# Patient Record
Sex: FEMALE | Race: WHITE
[De-identification: names, ages, dates, MRNs, and addresses within clinical notes are randomized per-mention and may not be internally consistent; named-entity substitution may affect disease eponyms.]

---

## 2018-01-10 ENCOUNTER — HOSPITAL ENCOUNTER (OUTPATIENT)
Dept: HOSPITAL 53 - M LAB | Age: 83
End: 2018-01-10
Attending: FAMILY MEDICINE
Payer: MEDICARE

## 2018-01-10 ENCOUNTER — HOSPITAL ENCOUNTER (OUTPATIENT)
Dept: HOSPITAL 53 - M PT | Age: 83
LOS: 21 days | End: 2018-01-31
Attending: SURGERY
Payer: MEDICARE

## 2018-01-10 DIAGNOSIS — Z51.89: Primary | ICD-10-CM

## 2018-01-10 DIAGNOSIS — I89.0: ICD-10-CM

## 2018-01-10 DIAGNOSIS — I48.2: Primary | ICD-10-CM

## 2018-01-10 DIAGNOSIS — Z79.01: ICD-10-CM

## 2018-01-10 LAB
INR: 1.74
PROTHROMBIN TIME: 20.9 SECONDS (ref 12.4–14.5)

## 2018-01-10 PROCEDURE — 93005 ELECTROCARDIOGRAM TRACING: CPT

## 2018-01-10 PROCEDURE — 85610 PROTHROMBIN TIME: CPT

## 2018-01-18 ENCOUNTER — HOSPITAL ENCOUNTER (INPATIENT)
Dept: HOSPITAL 53 - M ED | Age: 83
LOS: 8 days | Discharge: NURSING, REHAB OR LONG-TERM CARE | DRG: 314 | End: 2018-01-26
Attending: INTERNAL MEDICINE | Admitting: INTERNAL MEDICINE
Payer: MEDICARE

## 2018-01-18 DIAGNOSIS — Z91.19: ICD-10-CM

## 2018-01-18 DIAGNOSIS — Z88.0: ICD-10-CM

## 2018-01-18 DIAGNOSIS — E11.40: ICD-10-CM

## 2018-01-18 DIAGNOSIS — I35.0: ICD-10-CM

## 2018-01-18 DIAGNOSIS — Z91.11: ICD-10-CM

## 2018-01-18 DIAGNOSIS — E66.01: ICD-10-CM

## 2018-01-18 DIAGNOSIS — E03.9: ICD-10-CM

## 2018-01-18 DIAGNOSIS — Z86.711: ICD-10-CM

## 2018-01-18 DIAGNOSIS — Z88.8: ICD-10-CM

## 2018-01-18 DIAGNOSIS — K44.9: ICD-10-CM

## 2018-01-18 DIAGNOSIS — Z79.4: ICD-10-CM

## 2018-01-18 DIAGNOSIS — I27.81: ICD-10-CM

## 2018-01-18 DIAGNOSIS — I44.2: ICD-10-CM

## 2018-01-18 DIAGNOSIS — I11.0: ICD-10-CM

## 2018-01-18 DIAGNOSIS — Z79.01: ICD-10-CM

## 2018-01-18 DIAGNOSIS — Z95.2: ICD-10-CM

## 2018-01-18 DIAGNOSIS — I50.33: ICD-10-CM

## 2018-01-18 DIAGNOSIS — K21.9: ICD-10-CM
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
## 2018-01-18 DIAGNOSIS — I46.8: ICD-10-CM

## 2018-01-18 DIAGNOSIS — Z86.718: ICD-10-CM

## 2018-01-18 DIAGNOSIS — M19.90: ICD-10-CM

## 2018-01-18 DIAGNOSIS — I48.2: ICD-10-CM

## 2018-01-18 DIAGNOSIS — T82.198A: Primary | ICD-10-CM

## 2018-01-18 DIAGNOSIS — I73.9: ICD-10-CM

## 2018-01-18 DIAGNOSIS — G47.33: ICD-10-CM

## 2018-01-18 DIAGNOSIS — I27.20: ICD-10-CM

## 2018-01-18 DIAGNOSIS — Z88.2: ICD-10-CM

## 2018-01-18 DIAGNOSIS — E78.00: ICD-10-CM

## 2018-01-18 LAB
ALBUMIN/GLOBULIN RATIO: 0.72 (ref 1–1.93)
ALBUMIN: 3.4 GM/DL (ref 3.2–5.2)
ALKALINE PHOSPHATASE: 92 U/L (ref 45–117)
ALT SERPL W P-5'-P-CCNC: 15 U/L (ref 12–78)
ANION GAP: 6 MEQ/L (ref 8–16)
APPEARANCE, URINE: CLEAR
AST SERPL-CCNC: 19 U/L (ref 7–37)
BACTERIA UR QL AUTO: (no result)
BASO #: 0.1 10^3/UL (ref 0–0.2)
BASO %: 1.2 % (ref 0–1)
BILIRUB CONJ SERPL-MCNC: 0.4 MG/DL (ref 0–0.2)
BILIRUBIN, URINE AUTO: NEGATIVE
BILIRUBIN,TOTAL: 1 MG/DL (ref 0.2–1)
BLOOD UREA NITROGEN: 22 MG/DL (ref 7–18)
BLOOD, URINE BLOOD: NEGATIVE
CALCIUM LEVEL: 9 MG/DL (ref 8.8–10.2)
CARBON DIOXIDE LEVEL: 27 MEQ/L (ref 21–32)
CHLORIDE LEVEL: 109 MEQ/L (ref 98–107)
CK MB CFR.DF SERPL CALC: 4.44
CK SERPL-CCNC: 36 U/L (ref 26–192)
CK-MB VALUE MASS: 1.6 NG/ML (ref 0–3.6)
CREATININE FOR GFR: 1.14 MG/DL (ref 0.55–1.02)
D-DIMER QUANT: 1016 NG/ML (ref ?–500)
EOS #: 0.2 10^3/UL (ref 0–0.5)
EOSINOPHIL NFR BLD AUTO: 1.9 % (ref 0–3)
FREE T4: 1.57 NG/DL (ref 0.76–1.46)
GFR SERPL CREATININE-BSD FRML MDRD: 48.6 ML/MIN/{1.73_M2} (ref 32–?)
GLUCOSE BLDC GLUCOMTR-MCNC: 105 MG/DL (ref 83–110)
GLUCOSE BLDC GLUCOMTR-MCNC: 106 MG/DL (ref 83–110)
GLUCOSE BLDC GLUCOMTR-MCNC: 95 MG/DL (ref 83–110)
GLUCOSE, FASTING: 120 MG/DL (ref 83–110)
GLUCOSE, URINE (UA) AUTO: NEGATIVE MG/DL
HEMATOCRIT: 42.8 % (ref 36–47)
HEMOGLOBIN: 13.5 G/DL (ref 12–16)
IMMATURE GRANULOCYTE #: 0 10^3/UL (ref 0–0)
IMMATURE GRANULOCYTE %: 0.4 % (ref 0–0)
INR: 1.24
KETONE, URINE AUTO: NEGATIVE MG/DL
LEUKOCYTE ESTERASE UR QL STRIP.AUTO: (no result)
LYMPH #: 1.3 10^3/UL (ref 1.5–4.5)
LYMPH %: 16.1 % (ref 24–44)
MAGNESIUM LEVEL: 2.1 MG/DL (ref 1.8–2.4)
MEAN CORPUSCULAR HEMOGLOBIN: 28.7 PG (ref 27–33)
MEAN CORPUSCULAR HGB CONC: 31.5 G/DL (ref 32–36.5)
MEAN CORPUSCULAR VOLUME: 90.9 FL (ref 80–96)
MONO #: 0.5 10^3/UL (ref 0–0.8)
MONO %: 6.5 % (ref 0–5)
NEUTROPHILS #: 6.1 10^3/UL (ref 1.8–7.7)
NEUTROPHILS %: 73.9 % (ref 36–66)
NITRITE, URINE AUTO: NEGATIVE
NRBC BLD AUTO-RTO: 0 % (ref 0–0)
PARTIAL THROMBOPLASTIN TIME: 31.5 SECONDS (ref 26.8–37.9)
PH,URINE: 5 UNITS (ref 5–9)
PLATELET COUNT, AUTOMATED: 207 10^3/UL (ref 150–450)
POTASSIUM SERUM: 4.4 MEQ/L (ref 3.5–5.1)
PROT UR QL STRIP.AUTO: NEGATIVE MG/DL
PROTHROMBIN TIME: 15.8 SECONDS (ref 12.4–14.5)
RBC, URINE AUTO: 1 /HPF (ref 0–3)
RED BLOOD COUNT: 4.71 10^6/UL (ref 4–5.4)
RED CELL DISTRIBUTION WIDTH: 16 % (ref 11.5–14.5)
SODIUM LEVEL: 142 MEQ/L (ref 136–145)
SPECIFIC GRAVITY URINE AUTO: 1 (ref 1–1.03)
SQUAMOUS #/AREA URNS AUTO: 0 /HPF (ref 0–6)
THYROID STIMULATING HORMONE: 5.75 UIU/ML (ref 0.36–3.74)
TOTAL PROTEIN: 8.1 GM/DL (ref 6.4–8.2)
TROPONIN I: < 0.02 NG/ML (ref ?–0.1)
UROBILINOGEN, URINE AUTO: 0.2 MG/DL (ref 0–2)
WBC, URINE AUTO: 9 /HPF (ref 0–3)
WHITE BLOOD COUNT: 8.3 10^3/UL (ref 4–10)

## 2018-01-18 RX ADMIN — INSULIN LISPRO 1 UNITS: 100 INJECTION, SOLUTION INTRAVENOUS; SUBCUTANEOUS at 21:00

## 2018-01-18 RX ADMIN — INSULIN DETEMIR 1 UNITS: 100 INJECTION, SOLUTION SUBCUTANEOUS at 20:46

## 2018-01-18 RX ADMIN — ALBUTEROL SULFATE 1 MG: 2.5 SOLUTION RESPIRATORY (INHALATION) at 22:29

## 2018-01-18 RX ADMIN — INSULIN LISPRO 2 UNITS: 100 INJECTION, SOLUTION INTRAVENOUS; SUBCUTANEOUS at 18:19

## 2018-01-18 RX ADMIN — OMEPRAZOLE 1 MG: 20 CAPSULE, DELAYED RELEASE ORAL at 09:00

## 2018-01-18 RX ADMIN — IPRATROPIUM BROMIDE AND ALBUTEROL SULFATE 1 ML: .5; 3 SOLUTION RESPIRATORY (INHALATION) at 14:48

## 2018-01-18 RX ADMIN — ENOXAPARIN SODIUM 1 MG: 100 INJECTION SUBCUTANEOUS at 18:17

## 2018-01-18 RX ADMIN — DOCUSATE SODIUM,SENNOSIDES 1 TAB: 50; 8.6 TABLET, FILM COATED ORAL at 20:46

## 2018-01-18 RX ADMIN — TORSEMIDE 1 MG: 20 TABLET ORAL at 18:18

## 2018-01-18 RX ADMIN — ALBUTEROL SULFATE 1 MG: 2.5 SOLUTION RESPIRATORY (INHALATION) at 14:48

## 2018-01-18 RX ADMIN — WARFARIN SODIUM 1 MG: 5 TABLET ORAL at 18:18

## 2018-01-18 RX ADMIN — ENOXAPARIN SODIUM 1 MG: 100 INJECTION SUBCUTANEOUS at 14:30

## 2018-01-19 LAB
ALBUMIN: 2.7 GM/DL (ref 3.2–5.2)
ANION GAP: 9 MEQ/L (ref 8–16)
BLOOD UREA NITROGEN: 21 MG/DL (ref 7–18)
CALCIUM LEVEL: 8.5 MG/DL (ref 8.8–10.2)
CARBON DIOXIDE LEVEL: 26 MEQ/L (ref 21–32)
CHLORIDE LEVEL: 108 MEQ/L (ref 98–107)
CREATININE FOR GFR: 1.11 MG/DL (ref 0.55–1.02)
GFR SERPL CREATININE-BSD FRML MDRD: 50.1 ML/MIN/{1.73_M2} (ref 32–?)
GLUCOSE BLDC GLUCOMTR-MCNC: 111 MG/DL (ref 83–110)
GLUCOSE BLDC GLUCOMTR-MCNC: 130 MG/DL (ref 83–110)
GLUCOSE BLDC GLUCOMTR-MCNC: 161 MG/DL (ref 83–110)
GLUCOSE, FASTING: 111 MG/DL (ref 83–110)
INR: 1.42
PHOSPHORUS LEVEL: 4.1 MG/DL (ref 2.5–4.9)
POTASSIUM SERUM: 4 MEQ/L (ref 3.5–5.1)
PROTHROMBIN TIME: 17.7 SECONDS (ref 12.4–14.5)
SODIUM LEVEL: 143 MEQ/L (ref 136–145)
TROPONIN I: 0.03 NG/ML (ref ?–0.1)

## 2018-01-19 RX ADMIN — TORSEMIDE 1 MG: 20 TABLET ORAL at 08:46

## 2018-01-19 RX ADMIN — DOCUSATE SODIUM,SENNOSIDES 1 TAB: 50; 8.6 TABLET, FILM COATED ORAL at 08:46

## 2018-01-19 RX ADMIN — SPIRONOLACTONE 1 MG: 25 TABLET, FILM COATED ORAL at 16:52

## 2018-01-19 RX ADMIN — INSULIN LISPRO 4 UNITS: 100 INJECTION, SOLUTION INTRAVENOUS; SUBCUTANEOUS at 12:25

## 2018-01-19 RX ADMIN — ALBUTEROL SULFATE 1 MG: 2.5 SOLUTION RESPIRATORY (INHALATION) at 04:27

## 2018-01-19 RX ADMIN — OMEPRAZOLE 1 MG: 20 CAPSULE, DELAYED RELEASE ORAL at 08:46

## 2018-01-19 RX ADMIN — WARFARIN SODIUM 1 MG: 3 TABLET ORAL at 16:52

## 2018-01-19 RX ADMIN — TORSEMIDE 1 MG: 20 TABLET ORAL at 16:52

## 2018-01-19 RX ADMIN — LEVOTHYROXINE SODIUM 1 MCG: 25 TABLET ORAL at 05:55

## 2018-01-19 RX ADMIN — DOCUSATE SODIUM,SENNOSIDES 1 TAB: 50; 8.6 TABLET, FILM COATED ORAL at 21:52

## 2018-01-19 RX ADMIN — INSULIN LISPRO 2 UNITS: 100 INJECTION, SOLUTION INTRAVENOUS; SUBCUTANEOUS at 08:47

## 2018-01-19 RX ADMIN — ENOXAPARIN SODIUM 1 MG: 100 INJECTION SUBCUTANEOUS at 05:55

## 2018-01-19 RX ADMIN — ENOXAPARIN SODIUM 1 MG: 100 INJECTION SUBCUTANEOUS at 16:52

## 2018-01-19 RX ADMIN — INSULIN DETEMIR 1 UNITS: 100 INJECTION, SOLUTION SUBCUTANEOUS at 08:46

## 2018-01-19 RX ADMIN — INSULIN DETEMIR 1 UNITS: 100 INJECTION, SOLUTION SUBCUTANEOUS at 21:52

## 2018-01-19 RX ADMIN — INSULIN LISPRO 1 UNITS: 100 INJECTION, SOLUTION INTRAVENOUS; SUBCUTANEOUS at 21:00

## 2018-01-19 RX ADMIN — INSULIN LISPRO 2 UNITS: 100 INJECTION, SOLUTION INTRAVENOUS; SUBCUTANEOUS at 16:53

## 2018-01-20 LAB
ALBUMIN: 2.6 GM/DL (ref 3.2–5.2)
ANION GAP: 8 MEQ/L (ref 8–16)
BLOOD UREA NITROGEN: 20 MG/DL (ref 7–18)
CALCIUM LEVEL: 8.2 MG/DL (ref 8.8–10.2)
CARBON DIOXIDE LEVEL: 32 MEQ/L (ref 21–32)
CHLORIDE LEVEL: 104 MEQ/L (ref 98–107)
CREATININE FOR GFR: 1.19 MG/DL (ref 0.55–1.02)
GFR SERPL CREATININE-BSD FRML MDRD: 46.2 ML/MIN/{1.73_M2} (ref 32–?)
GLUCOSE BLDC GLUCOMTR-MCNC: 119 MG/DL (ref 83–110)
GLUCOSE BLDC GLUCOMTR-MCNC: 122 MG/DL (ref 83–110)
GLUCOSE BLDC GLUCOMTR-MCNC: 133 MG/DL (ref 83–110)
GLUCOSE, FASTING: 101 MG/DL (ref 83–110)
INR: 1.78
PHOSPHORUS LEVEL: 3.4 MG/DL (ref 2.5–4.9)
POTASSIUM SERUM: 3.2 MEQ/L (ref 3.5–5.1)
PROTHROMBIN TIME: 21.3 SECONDS (ref 12.4–14.5)
SODIUM LEVEL: 144 MEQ/L (ref 136–145)

## 2018-01-20 RX ADMIN — INSULIN DETEMIR 1 UNITS: 100 INJECTION, SOLUTION SUBCUTANEOUS at 07:53

## 2018-01-20 RX ADMIN — DOCUSATE SODIUM,SENNOSIDES 1 TAB: 50; 8.6 TABLET, FILM COATED ORAL at 20:24

## 2018-01-20 RX ADMIN — INSULIN LISPRO 1 UNITS: 100 INJECTION, SOLUTION INTRAVENOUS; SUBCUTANEOUS at 17:26

## 2018-01-20 RX ADMIN — NYSTATIN 1 DOSE: 100000 POWDER TOPICAL at 20:24

## 2018-01-20 RX ADMIN — POTASSIUM CHLORIDE 1 MEQ: 750 TABLET, EXTENDED RELEASE ORAL at 20:23

## 2018-01-20 RX ADMIN — LEVOTHYROXINE SODIUM 1 MCG: 25 TABLET ORAL at 05:41

## 2018-01-20 RX ADMIN — OMEPRAZOLE 1 MG: 20 CAPSULE, DELAYED RELEASE ORAL at 07:53

## 2018-01-20 RX ADMIN — POTASSIUM CHLORIDE 1 MEQ: 750 TABLET, EXTENDED RELEASE ORAL at 14:19

## 2018-01-20 RX ADMIN — INSULIN LISPRO 1 UNITS: 100 INJECTION, SOLUTION INTRAVENOUS; SUBCUTANEOUS at 20:25

## 2018-01-20 RX ADMIN — TORSEMIDE 1 MG: 20 TABLET ORAL at 07:52

## 2018-01-20 RX ADMIN — INSULIN DETEMIR 1 UNITS: 100 INJECTION, SOLUTION SUBCUTANEOUS at 20:27

## 2018-01-20 RX ADMIN — DOCUSATE SODIUM,SENNOSIDES 1 TAB: 50; 8.6 TABLET, FILM COATED ORAL at 07:53

## 2018-01-20 RX ADMIN — SPIRONOLACTONE 1 MG: 25 TABLET, FILM COATED ORAL at 07:53

## 2018-01-20 RX ADMIN — INSULIN LISPRO 1 UNITS: 100 INJECTION, SOLUTION INTRAVENOUS; SUBCUTANEOUS at 07:30

## 2018-01-20 RX ADMIN — ACETAMINOPHEN 1 MG: 325 TABLET ORAL at 17:25

## 2018-01-20 RX ADMIN — WARFARIN SODIUM 1 MG: 5 TABLET ORAL at 17:25

## 2018-01-20 RX ADMIN — ENOXAPARIN SODIUM 1 MG: 100 INJECTION SUBCUTANEOUS at 05:41

## 2018-01-20 RX ADMIN — NYSTATIN 1 DOSE: 100000 POWDER TOPICAL at 09:00

## 2018-01-20 RX ADMIN — INSULIN LISPRO 2 UNITS: 100 INJECTION, SOLUTION INTRAVENOUS; SUBCUTANEOUS at 14:20

## 2018-01-21 LAB
ALBUMIN: 2.6 GM/DL (ref 3.2–5.2)
ANION GAP: 7 MEQ/L (ref 8–16)
BLOOD UREA NITROGEN: 22 MG/DL (ref 7–18)
CALCIUM LEVEL: 8.1 MG/DL (ref 8.8–10.2)
CARBON DIOXIDE LEVEL: 33 MEQ/L (ref 21–32)
CHLORIDE LEVEL: 103 MEQ/L (ref 98–107)
CREATININE FOR GFR: 1.15 MG/DL (ref 0.55–1.02)
GFR SERPL CREATININE-BSD FRML MDRD: 48.1 ML/MIN/{1.73_M2} (ref 32–?)
GLUCOSE BLDC GLUCOMTR-MCNC: 128 MG/DL (ref 83–110)
GLUCOSE BLDC GLUCOMTR-MCNC: 150 MG/DL (ref 83–110)
GLUCOSE BLDC GLUCOMTR-MCNC: 153 MG/DL (ref 83–110)
GLUCOSE, FASTING: 103 MG/DL (ref 83–110)
INR: 1.89
PHOSPHORUS LEVEL: 3.3 MG/DL (ref 2.5–4.9)
POTASSIUM SERUM: 3.6 MEQ/L (ref 3.5–5.1)
PROTHROMBIN TIME: 22.3 SECONDS (ref 12.4–14.5)
SODIUM LEVEL: 143 MEQ/L (ref 136–145)

## 2018-01-21 RX ADMIN — SPIRONOLACTONE 1 MG: 25 TABLET, FILM COATED ORAL at 08:14

## 2018-01-21 RX ADMIN — INSULIN LISPRO 1 UNITS: 100 INJECTION, SOLUTION INTRAVENOUS; SUBCUTANEOUS at 07:30

## 2018-01-21 RX ADMIN — LEVOTHYROXINE SODIUM 1 MCG: 25 TABLET ORAL at 06:11

## 2018-01-21 RX ADMIN — INSULIN DETEMIR 1 UNITS: 100 INJECTION, SOLUTION SUBCUTANEOUS at 08:15

## 2018-01-21 RX ADMIN — NYSTATIN 1 DOSE: 100000 POWDER TOPICAL at 20:59

## 2018-01-21 RX ADMIN — DOCUSATE SODIUM,SENNOSIDES 1 TAB: 50; 8.6 TABLET, FILM COATED ORAL at 08:14

## 2018-01-21 RX ADMIN — WARFARIN SODIUM 1 MG: 5 TABLET ORAL at 18:31

## 2018-01-21 RX ADMIN — INSULIN LISPRO 2 UNITS: 100 INJECTION, SOLUTION INTRAVENOUS; SUBCUTANEOUS at 12:50

## 2018-01-21 RX ADMIN — INSULIN LISPRO 1 UNITS: 100 INJECTION, SOLUTION INTRAVENOUS; SUBCUTANEOUS at 20:59

## 2018-01-21 RX ADMIN — DOCUSATE SODIUM,SENNOSIDES 1 TAB: 50; 8.6 TABLET, FILM COATED ORAL at 20:59

## 2018-01-21 RX ADMIN — INSULIN DETEMIR 1 UNITS: 100 INJECTION, SOLUTION SUBCUTANEOUS at 20:58

## 2018-01-21 RX ADMIN — POTASSIUM CHLORIDE 1 MEQ: 750 TABLET, EXTENDED RELEASE ORAL at 08:14

## 2018-01-21 RX ADMIN — ALBUTEROL SULFATE 1 MG: 2.5 SOLUTION RESPIRATORY (INHALATION) at 04:35

## 2018-01-21 RX ADMIN — NYSTATIN 1 DOSE: 100000 POWDER TOPICAL at 08:15

## 2018-01-21 RX ADMIN — TORSEMIDE 1 MG: 20 TABLET ORAL at 12:50

## 2018-01-21 RX ADMIN — INSULIN LISPRO 4 UNITS: 100 INJECTION, SOLUTION INTRAVENOUS; SUBCUTANEOUS at 18:32

## 2018-01-21 RX ADMIN — OMEPRAZOLE 1 MG: 20 CAPSULE, DELAYED RELEASE ORAL at 08:14

## 2018-01-21 RX ADMIN — POTASSIUM CHLORIDE 1 MEQ: 750 TABLET, EXTENDED RELEASE ORAL at 20:59

## 2018-01-22 LAB
ALBUMIN: 2.6 GM/DL (ref 3.2–5.2)
ANION GAP: 7 MEQ/L (ref 8–16)
BLOOD UREA NITROGEN: 22 MG/DL (ref 7–18)
CALCIUM LEVEL: 8 MG/DL (ref 8.8–10.2)
CARBON DIOXIDE LEVEL: 30 MEQ/L (ref 21–32)
CHLORIDE LEVEL: 104 MEQ/L (ref 98–107)
CREATININE FOR GFR: 1.26 MG/DL (ref 0.55–1.02)
GFR SERPL CREATININE-BSD FRML MDRD: 43.3 ML/MIN/{1.73_M2} (ref 32–?)
GLUCOSE BLDC GLUCOMTR-MCNC: 114 MG/DL (ref 83–110)
GLUCOSE BLDC GLUCOMTR-MCNC: 127 MG/DL (ref 83–110)
GLUCOSE BLDC GLUCOMTR-MCNC: 177 MG/DL (ref 83–110)
GLUCOSE, FASTING: 121 MG/DL (ref 83–110)
INR: 2.32
PHOSPHORUS LEVEL: 3.4 MG/DL (ref 2.5–4.9)
POTASSIUM SERUM: 4.3 MEQ/L (ref 3.5–5.1)
PROTHROMBIN TIME: 26.4 SECONDS (ref 12.4–14.5)
SODIUM LEVEL: 141 MEQ/L (ref 136–145)

## 2018-01-22 RX ADMIN — ACETAMINOPHEN 1 MG: 325 TABLET ORAL at 22:16

## 2018-01-22 RX ADMIN — INSULIN DETEMIR 1 UNITS: 100 INJECTION, SOLUTION SUBCUTANEOUS at 22:15

## 2018-01-22 RX ADMIN — SPIRONOLACTONE 1 MG: 25 TABLET, FILM COATED ORAL at 09:23

## 2018-01-22 RX ADMIN — INSULIN LISPRO 1 UNITS: 100 INJECTION, SOLUTION INTRAVENOUS; SUBCUTANEOUS at 21:00

## 2018-01-22 RX ADMIN — NYSTATIN 1 DOSE: 100000 POWDER TOPICAL at 09:24

## 2018-01-22 RX ADMIN — NYSTATIN 1 DOSE: 100000 POWDER TOPICAL at 21:00

## 2018-01-22 RX ADMIN — ALBUTEROL SULFATE 1 MG: 2.5 SOLUTION RESPIRATORY (INHALATION) at 03:45

## 2018-01-22 RX ADMIN — INSULIN LISPRO 4 UNITS: 100 INJECTION, SOLUTION INTRAVENOUS; SUBCUTANEOUS at 16:44

## 2018-01-22 RX ADMIN — INSULIN LISPRO 2 UNITS: 100 INJECTION, SOLUTION INTRAVENOUS; SUBCUTANEOUS at 11:23

## 2018-01-22 RX ADMIN — LEVOTHYROXINE SODIUM 1 MCG: 25 TABLET ORAL at 06:07

## 2018-01-22 RX ADMIN — DOCUSATE SODIUM,SENNOSIDES 1 TAB: 50; 8.6 TABLET, FILM COATED ORAL at 09:22

## 2018-01-22 RX ADMIN — INSULIN DETEMIR 1 UNITS: 100 INJECTION, SOLUTION SUBCUTANEOUS at 09:22

## 2018-01-22 RX ADMIN — DOCUSATE SODIUM,SENNOSIDES 1 TAB: 50; 8.6 TABLET, FILM COATED ORAL at 21:00

## 2018-01-22 RX ADMIN — POTASSIUM CHLORIDE 1 MEQ: 750 TABLET, EXTENDED RELEASE ORAL at 09:23

## 2018-01-22 RX ADMIN — TORSEMIDE 1 MG: 20 TABLET ORAL at 09:23

## 2018-01-22 RX ADMIN — INSULIN LISPRO 2 UNITS: 100 INJECTION, SOLUTION INTRAVENOUS; SUBCUTANEOUS at 09:22

## 2018-01-22 RX ADMIN — OMEPRAZOLE 1 MG: 20 CAPSULE, DELAYED RELEASE ORAL at 09:23

## 2018-01-22 RX ADMIN — WARFARIN SODIUM 1 MG: 5 TABLET ORAL at 16:44

## 2018-01-22 RX ADMIN — ALBUTEROL SULFATE 1 MG: 2.5 SOLUTION RESPIRATORY (INHALATION) at 21:01

## 2018-01-23 LAB
ALBUMIN: 2.5 GM/DL (ref 3.2–5.2)
ANION GAP: 9 MEQ/L (ref 8–16)
BLOOD UREA NITROGEN: 23 MG/DL (ref 7–18)
CALCIUM LEVEL: 8.4 MG/DL (ref 8.8–10.2)
CARBON DIOXIDE LEVEL: 30 MEQ/L (ref 21–32)
CHLORIDE LEVEL: 102 MEQ/L (ref 98–107)
CREATININE FOR GFR: 1.12 MG/DL (ref 0.55–1.02)
GFR SERPL CREATININE-BSD FRML MDRD: 49.6 ML/MIN/{1.73_M2} (ref 32–?)
GLUCOSE BLDC GLUCOMTR-MCNC: 113 MG/DL (ref 83–110)
GLUCOSE BLDC GLUCOMTR-MCNC: 134 MG/DL (ref 83–110)
GLUCOSE BLDC GLUCOMTR-MCNC: 166 MG/DL (ref 83–110)
GLUCOSE, FASTING: 102 MG/DL (ref 70–100)
INR: 2.4
PHOSPHORUS LEVEL: 3.8 MG/DL (ref 2.5–4.9)
POTASSIUM SERUM: 4 MEQ/L (ref 3.5–5.1)
PROTHROMBIN TIME: 27.1 SECONDS (ref 12.4–14.5)
SODIUM LEVEL: 141 MEQ/L (ref 136–145)

## 2018-01-23 RX ADMIN — NYSTATIN 1 DOSE: 100000 POWDER TOPICAL at 20:25

## 2018-01-23 RX ADMIN — INSULIN DETEMIR 1 UNITS: 100 INJECTION, SOLUTION SUBCUTANEOUS at 08:46

## 2018-01-23 RX ADMIN — INSULIN LISPRO 2 UNITS: 100 INJECTION, SOLUTION INTRAVENOUS; SUBCUTANEOUS at 08:46

## 2018-01-23 RX ADMIN — DOCUSATE SODIUM,SENNOSIDES 1 TAB: 50; 8.6 TABLET, FILM COATED ORAL at 20:24

## 2018-01-23 RX ADMIN — LEVOTHYROXINE SODIUM 1 MCG: 25 TABLET ORAL at 06:14

## 2018-01-23 RX ADMIN — DOCUSATE SODIUM,SENNOSIDES 1 TAB: 50; 8.6 TABLET, FILM COATED ORAL at 08:47

## 2018-01-23 RX ADMIN — INSULIN LISPRO 1 UNITS: 100 INJECTION, SOLUTION INTRAVENOUS; SUBCUTANEOUS at 20:30

## 2018-01-23 RX ADMIN — INSULIN DETEMIR 1 UNITS: 100 INJECTION, SOLUTION SUBCUTANEOUS at 20:24

## 2018-01-23 RX ADMIN — INSULIN LISPRO 4 UNITS: 100 INJECTION, SOLUTION INTRAVENOUS; SUBCUTANEOUS at 17:39

## 2018-01-23 RX ADMIN — INSULIN LISPRO 2 UNITS: 100 INJECTION, SOLUTION INTRAVENOUS; SUBCUTANEOUS at 13:14

## 2018-01-23 RX ADMIN — SPIRONOLACTONE 1 MG: 25 TABLET, FILM COATED ORAL at 08:47

## 2018-01-23 RX ADMIN — TORSEMIDE 1 MG: 20 TABLET ORAL at 08:47

## 2018-01-23 RX ADMIN — OMEPRAZOLE 1 MG: 20 CAPSULE, DELAYED RELEASE ORAL at 08:47

## 2018-01-23 RX ADMIN — NYSTATIN 1 DOSE: 100000 POWDER TOPICAL at 08:48

## 2018-01-23 RX ADMIN — WARFARIN SODIUM 1 MG: 5 TABLET ORAL at 17:39

## 2018-01-24 LAB
ALBUMIN: 2.5 GM/DL (ref 3.2–5.2)
ANION GAP: 7 MEQ/L (ref 8–16)
BLOOD UREA NITROGEN: 24 MG/DL (ref 7–18)
CALCIUM LEVEL: 8.2 MG/DL (ref 8.8–10.2)
CARBON DIOXIDE LEVEL: 32 MEQ/L (ref 21–32)
CHLORIDE LEVEL: 102 MEQ/L (ref 98–107)
CREATININE FOR GFR: 1.18 MG/DL (ref 0.55–1.02)
GFR SERPL CREATININE-BSD FRML MDRD: 46.7 ML/MIN/{1.73_M2} (ref 32–?)
GLUCOSE BLDC GLUCOMTR-MCNC: 131 MG/DL (ref 83–110)
GLUCOSE BLDC GLUCOMTR-MCNC: 142 MG/DL (ref 83–110)
GLUCOSE BLDC GLUCOMTR-MCNC: 158 MG/DL (ref 83–110)
GLUCOSE, FASTING: 126 MG/DL (ref 70–100)
INR: 2.65
PHOSPHORUS LEVEL: 3.8 MG/DL (ref 2.5–4.9)
POTASSIUM SERUM: 4 MEQ/L (ref 3.5–5.1)
PROTHROMBIN TIME: 29.4 SECONDS (ref 12.4–14.5)
SODIUM LEVEL: 141 MEQ/L (ref 136–145)

## 2018-01-24 RX ADMIN — Medication 1 DOSE: at 12:30

## 2018-01-24 RX ADMIN — ACETAMINOPHEN 1 MG: 325 TABLET ORAL at 17:10

## 2018-01-24 RX ADMIN — ACETAMINOPHEN 1 MG: 325 TABLET ORAL at 07:44

## 2018-01-24 RX ADMIN — TORSEMIDE 1 MG: 20 TABLET ORAL at 09:27

## 2018-01-24 RX ADMIN — SPIRONOLACTONE 1 MG: 25 TABLET, FILM COATED ORAL at 09:27

## 2018-01-24 RX ADMIN — DOCUSATE SODIUM,SENNOSIDES 1 TAB: 50; 8.6 TABLET, FILM COATED ORAL at 09:27

## 2018-01-24 RX ADMIN — INSULIN LISPRO 2 UNITS: 100 INJECTION, SOLUTION INTRAVENOUS; SUBCUTANEOUS at 09:29

## 2018-01-24 RX ADMIN — NYSTATIN 1 DOSE: 100000 POWDER TOPICAL at 09:30

## 2018-01-24 RX ADMIN — WARFARIN SODIUM 1 MG: 5 TABLET ORAL at 17:09

## 2018-01-24 RX ADMIN — INSULIN DETEMIR 1 UNITS: 100 INJECTION, SOLUTION SUBCUTANEOUS at 09:28

## 2018-01-24 RX ADMIN — INSULIN DETEMIR 1 UNITS: 100 INJECTION, SOLUTION SUBCUTANEOUS at 20:48

## 2018-01-24 RX ADMIN — INSULIN LISPRO 1 UNITS: 100 INJECTION, SOLUTION INTRAVENOUS; SUBCUTANEOUS at 20:43

## 2018-01-24 RX ADMIN — INSULIN LISPRO 4 UNITS: 100 INJECTION, SOLUTION INTRAVENOUS; SUBCUTANEOUS at 13:58

## 2018-01-24 RX ADMIN — DOCUSATE SODIUM,SENNOSIDES 1 TAB: 50; 8.6 TABLET, FILM COATED ORAL at 20:48

## 2018-01-24 RX ADMIN — Medication 1 DOSE: at 20:48

## 2018-01-24 RX ADMIN — LEVOTHYROXINE SODIUM 1 MCG: 25 TABLET ORAL at 05:09

## 2018-01-24 RX ADMIN — INSULIN LISPRO 1 UNITS: 100 INJECTION, SOLUTION INTRAVENOUS; SUBCUTANEOUS at 18:38

## 2018-01-24 RX ADMIN — NYSTATIN 1 DOSE: 100000 POWDER TOPICAL at 20:48

## 2018-01-24 RX ADMIN — OMEPRAZOLE 1 MG: 20 CAPSULE, DELAYED RELEASE ORAL at 09:28

## 2018-01-25 LAB
ALBUMIN: 2.6 GM/DL (ref 3.2–5.2)
ANION GAP: 7 MEQ/L (ref 8–16)
BLOOD UREA NITROGEN: 29 MG/DL (ref 7–18)
CALCIUM LEVEL: 7.9 MG/DL (ref 8.8–10.2)
CARBON DIOXIDE LEVEL: 30 MEQ/L (ref 21–32)
CHLORIDE LEVEL: 105 MEQ/L (ref 98–107)
CREATININE FOR GFR: 1.15 MG/DL (ref 0.55–1.02)
GFR SERPL CREATININE-BSD FRML MDRD: 48.1 ML/MIN/{1.73_M2} (ref 32–?)
GLUCOSE BLDC GLUCOMTR-MCNC: 159 MG/DL (ref 83–110)
GLUCOSE BLDC GLUCOMTR-MCNC: 172 MG/DL (ref 83–110)
GLUCOSE, FASTING: 108 MG/DL (ref 70–100)
INR: 2.64
PHOSPHORUS LEVEL: 3.2 MG/DL (ref 2.5–4.9)
POTASSIUM SERUM: 3.9 MEQ/L (ref 3.5–5.1)
PROTHROMBIN TIME: 29.3 SECONDS (ref 12.4–14.5)
SODIUM LEVEL: 142 MEQ/L (ref 136–145)

## 2018-01-25 RX ADMIN — LEVOTHYROXINE SODIUM 1 MCG: 25 TABLET ORAL at 05:02

## 2018-01-25 RX ADMIN — SPIRONOLACTONE 1 MG: 25 TABLET, FILM COATED ORAL at 08:00

## 2018-01-25 RX ADMIN — INSULIN LISPRO 2 UNITS: 100 INJECTION, SOLUTION INTRAVENOUS; SUBCUTANEOUS at 17:52

## 2018-01-25 RX ADMIN — OMEPRAZOLE 1 MG: 20 CAPSULE, DELAYED RELEASE ORAL at 08:00

## 2018-01-25 RX ADMIN — Medication 1 DOSE: at 08:01

## 2018-01-25 RX ADMIN — ACETAMINOPHEN 1 MG: 325 TABLET ORAL at 01:56

## 2018-01-25 RX ADMIN — WARFARIN SODIUM 1 MG: 5 TABLET ORAL at 17:52

## 2018-01-25 RX ADMIN — INSULIN LISPRO 4 UNITS: 100 INJECTION, SOLUTION INTRAVENOUS; SUBCUTANEOUS at 13:06

## 2018-01-25 RX ADMIN — TORSEMIDE 1 MG: 20 TABLET ORAL at 08:01

## 2018-01-25 RX ADMIN — DOCUSATE SODIUM,SENNOSIDES 1 TAB: 50; 8.6 TABLET, FILM COATED ORAL at 08:01

## 2018-01-25 RX ADMIN — INSULIN LISPRO 1 UNITS: 100 INJECTION, SOLUTION INTRAVENOUS; SUBCUTANEOUS at 21:01

## 2018-01-25 RX ADMIN — INSULIN LISPRO 2 UNITS: 100 INJECTION, SOLUTION INTRAVENOUS; SUBCUTANEOUS at 08:02

## 2018-01-25 RX ADMIN — INSULIN DETEMIR 1 UNITS: 100 INJECTION, SOLUTION SUBCUTANEOUS at 21:00

## 2018-01-25 RX ADMIN — NYSTATIN 1 DOSE: 100000 POWDER TOPICAL at 21:01

## 2018-01-25 RX ADMIN — NYSTATIN 1 DOSE: 100000 POWDER TOPICAL at 08:01

## 2018-01-25 RX ADMIN — DOCUSATE SODIUM,SENNOSIDES 1 TAB: 50; 8.6 TABLET, FILM COATED ORAL at 21:00

## 2018-01-25 RX ADMIN — INSULIN DETEMIR 1 UNITS: 100 INJECTION, SOLUTION SUBCUTANEOUS at 08:02

## 2018-01-25 RX ADMIN — ACETAMINOPHEN 1 MG: 325 TABLET ORAL at 08:02

## 2018-01-25 RX ADMIN — Medication 1 DOSE: at 21:01

## 2018-01-26 LAB
GLUCOSE BLDC GLUCOMTR-MCNC: 101 MG/DL (ref 83–110)
GLUCOSE BLDC GLUCOMTR-MCNC: 112 MG/DL (ref 83–110)
GLUCOSE BLDC GLUCOMTR-MCNC: 175 MG/DL (ref 83–110)

## 2018-01-26 RX ADMIN — NYSTATIN 1 DOSE: 100000 POWDER TOPICAL at 09:08

## 2018-01-26 RX ADMIN — SPIRONOLACTONE 1 MG: 25 TABLET, FILM COATED ORAL at 09:08

## 2018-01-26 RX ADMIN — DOCUSATE SODIUM,SENNOSIDES 1 TAB: 50; 8.6 TABLET, FILM COATED ORAL at 09:00

## 2018-01-26 RX ADMIN — Medication 1 DOSE: at 09:09

## 2018-01-26 RX ADMIN — INSULIN LISPRO 1 UNITS: 100 INJECTION, SOLUTION INTRAVENOUS; SUBCUTANEOUS at 07:28

## 2018-01-26 RX ADMIN — OMEPRAZOLE 1 MG: 20 CAPSULE, DELAYED RELEASE ORAL at 09:08

## 2018-01-26 RX ADMIN — TORSEMIDE 1 MG: 20 TABLET ORAL at 09:08

## 2018-01-26 RX ADMIN — ACETAMINOPHEN 1 MG: 325 TABLET ORAL at 00:25

## 2018-01-26 RX ADMIN — INSULIN DETEMIR 1 UNITS: 100 INJECTION, SOLUTION SUBCUTANEOUS at 09:08

## 2018-01-26 RX ADMIN — LEVOTHYROXINE SODIUM 1 MCG: 25 TABLET ORAL at 05:35

## 2018-01-26 RX ADMIN — DOCUSATE SODIUM,SENNOSIDES 1 TAB: 50; 8.6 TABLET, FILM COATED ORAL at 09:07

## 2018-01-30 ENCOUNTER — HOSPITAL ENCOUNTER (OUTPATIENT)
Dept: HOSPITAL 53 - SSVSNF3 | Age: 83
End: 2018-01-30
Attending: INTERNAL MEDICINE
Payer: MEDICARE

## 2018-01-30 DIAGNOSIS — I50.9: Primary | ICD-10-CM

## 2018-01-30 DIAGNOSIS — Z79.899: ICD-10-CM

## 2018-01-30 LAB
ALBUMIN/GLOBULIN RATIO: 0.71 (ref 1–1.93)
ALBUMIN: 2.9 GM/DL (ref 3.2–5.2)
ALKALINE PHOSPHATASE: 96 U/L (ref 45–117)
ALT SERPL W P-5'-P-CCNC: 19 U/L (ref 12–78)
ANION GAP: 8 MEQ/L (ref 8–16)
AST SERPL-CCNC: 29 U/L (ref 7–37)
BILIRUBIN,TOTAL: 0.6 MG/DL (ref 0.2–1)
BLOOD UREA NITROGEN: 29 MG/DL (ref 7–18)
CALCIUM LEVEL: 8.1 MG/DL (ref 8.8–10.2)
CARBON DIOXIDE LEVEL: 31 MEQ/L (ref 21–32)
CHLORIDE LEVEL: 101 MEQ/L (ref 98–107)
CREATININE FOR GFR: 1.31 MG/DL (ref 0.55–1.3)
EST. AVERAGE GLUCOSE BLD GHB EST-MCNC: 140 MG/DL (ref 60–110)
GFR SERPL CREATININE-BSD FRML MDRD: 41.4 ML/MIN/{1.73_M2} (ref 32–?)
GLUCOSE, FASTING: 171 MG/DL (ref 70–100)
HEMATOCRIT: 41.3 % (ref 36–47)
HEMOGLOBIN: 13.1 G/DL (ref 12–16)
MEAN CORPUSCULAR HEMOGLOBIN: 27.9 PG (ref 27–33)
MEAN CORPUSCULAR HGB CONC: 31.7 G/DL (ref 32–36.5)
MEAN CORPUSCULAR VOLUME: 87.9 FL (ref 80–96)
NRBC BLD AUTO-RTO: 0 % (ref 0–0)
PLATELET COUNT, AUTOMATED: 192 10^3/UL (ref 150–450)
POTASSIUM SERUM: 3.7 MEQ/L (ref 3.5–5.1)
RED BLOOD COUNT: 4.7 10^6/UL (ref 4–5.4)
RED CELL DISTRIBUTION WIDTH: 15.1 % (ref 11.5–14.5)
SODIUM LEVEL: 140 MEQ/L (ref 136–145)
TOTAL PROTEIN: 7 GM/DL (ref 6.4–8.2)
WHITE BLOOD COUNT: 5.8 10^3/UL (ref 4–10)

## 2018-01-30 PROCEDURE — 80053 COMPREHEN METABOLIC PANEL: CPT

## 2018-02-06 ENCOUNTER — HOSPITAL ENCOUNTER (OUTPATIENT)
Dept: HOSPITAL 53 - SSVSNF3 | Age: 83
End: 2018-02-06
Attending: INTERNAL MEDICINE

## 2018-02-06 DIAGNOSIS — I50.9: Primary | ICD-10-CM

## 2018-02-06 LAB
ALBUMIN/GLOBULIN RATIO: 0.71 (ref 1–1.93)
ALBUMIN: 3 GM/DL (ref 3.2–5.2)
ALKALINE PHOSPHATASE: 102 U/L (ref 45–117)
ALT SERPL W P-5'-P-CCNC: 25 U/L (ref 12–78)
ANION GAP: 12 MEQ/L (ref 8–16)
AST SERPL-CCNC: 32 U/L (ref 7–37)
BILIRUBIN,TOTAL: 0.8 MG/DL (ref 0.2–1)
BLOOD UREA NITROGEN: 25 MG/DL (ref 7–18)
CALCIUM LEVEL: 8.6 MG/DL (ref 8.8–10.2)
CARBON DIOXIDE LEVEL: 26 MEQ/L (ref 21–32)
CHLORIDE LEVEL: 103 MEQ/L (ref 98–107)
CREATININE FOR GFR: 1.24 MG/DL (ref 0.55–1.3)
GFR SERPL CREATININE-BSD FRML MDRD: 44.1 ML/MIN/{1.73_M2} (ref 32–?)
GLUCOSE, FASTING: 125 MG/DL (ref 70–100)
HEMATOCRIT: 40.5 % (ref 36–47)
HEMOGLOBIN: 13 G/DL (ref 12–16)
MEAN CORPUSCULAR HEMOGLOBIN: 28 PG (ref 27–33)
MEAN CORPUSCULAR HGB CONC: 32.1 G/DL (ref 32–36.5)
MEAN CORPUSCULAR VOLUME: 87.3 FL (ref 80–96)
NRBC BLD AUTO-RTO: 0 % (ref 0–0)
NT-PRO BNP: 1349 PG/ML (ref ?–450)
PLATELET COUNT, AUTOMATED: 191 10^3/UL (ref 150–450)
POTASSIUM SERUM: 3.8 MEQ/L (ref 3.5–5.1)
RED BLOOD COUNT: 4.64 10^6/UL (ref 4–5.4)
RED CELL DISTRIBUTION WIDTH: 15.3 % (ref 11.5–14.5)
SODIUM LEVEL: 141 MEQ/L (ref 136–145)
TOTAL PROTEIN: 7.2 GM/DL (ref 6.4–8.2)
WHITE BLOOD COUNT: 7.5 10^3/UL (ref 4–10)

## 2018-02-13 ENCOUNTER — HOSPITAL ENCOUNTER (OUTPATIENT)
Dept: HOSPITAL 53 - SSVSNF3 | Age: 83
End: 2018-02-13
Attending: INTERNAL MEDICINE

## 2018-02-13 DIAGNOSIS — I50.9: Primary | ICD-10-CM

## 2018-02-13 LAB
ANION GAP: 8 MEQ/L (ref 8–16)
BLOOD UREA NITROGEN: 29 MG/DL (ref 7–18)
CALCIUM LEVEL: 9 MG/DL (ref 8.8–10.2)
CARBON DIOXIDE LEVEL: 30 MEQ/L (ref 21–32)
CHLORIDE LEVEL: 106 MEQ/L (ref 98–107)
CREATININE FOR GFR: 1.34 MG/DL (ref 0.55–1.3)
GFR SERPL CREATININE-BSD FRML MDRD: 40.3 ML/MIN/{1.73_M2} (ref 32–?)
GLUCOSE, FASTING: 164 MG/DL (ref 70–100)
POTASSIUM SERUM: 3.8 MEQ/L (ref 3.5–5.1)
SODIUM LEVEL: 144 MEQ/L (ref 136–145)

## 2018-04-28 ENCOUNTER — HOSPITAL ENCOUNTER (EMERGENCY)
Dept: HOSPITAL 53 - M ED | Age: 83
LOS: 1 days | Discharge: HOME | End: 2018-04-29
Payer: MEDICARE

## 2018-04-28 DIAGNOSIS — E78.5: ICD-10-CM

## 2018-04-28 DIAGNOSIS — Z79.4: ICD-10-CM

## 2018-04-28 DIAGNOSIS — Z88.0: ICD-10-CM

## 2018-04-28 DIAGNOSIS — Z88.2: ICD-10-CM

## 2018-04-28 DIAGNOSIS — Z79.899: ICD-10-CM

## 2018-04-28 DIAGNOSIS — Z79.890: ICD-10-CM

## 2018-04-28 DIAGNOSIS — E11.9: ICD-10-CM

## 2018-04-28 DIAGNOSIS — Z88.1: ICD-10-CM

## 2018-04-28 DIAGNOSIS — R07.9: Primary | ICD-10-CM

## 2018-04-28 DIAGNOSIS — I10: ICD-10-CM

## 2018-04-28 LAB
BASO #: 0 10^3/UL (ref 0–0.2)
BASO %: 0.3 % (ref 0–1)
EOS #: 0.1 10^3/UL (ref 0–0.5)
EOSINOPHIL NFR BLD AUTO: 0.9 % (ref 0–3)
HEMATOCRIT: 45.6 % (ref 36–47)
HEMOGLOBIN: 14.4 G/DL (ref 12–15.5)
IMMATURE GRANULOCYTE %: 0.5 % (ref 0–3)
LYMPH #: 0.9 10^3/UL (ref 1.5–4.5)
LYMPH %: 7.4 % (ref 24–44)
MEAN CORPUSCULAR HEMOGLOBIN: 28.5 PG (ref 27–33)
MEAN CORPUSCULAR HGB CONC: 31.6 G/DL (ref 32–36.5)
MEAN CORPUSCULAR VOLUME: 90.3 FL (ref 80–96)
MONO #: 0.5 10^3/UL (ref 0–0.8)
MONO %: 4.3 % (ref 0–5)
NEUTROPHILS #: 10 10^3/UL (ref 1.8–7.7)
NEUTROPHILS %: 86.6 % (ref 36–66)
NRBC BLD AUTO-RTO: 0 % (ref 0–0)
PLATELET COUNT, AUTOMATED: 227 10^3/UL (ref 150–450)
RED BLOOD COUNT: 5.05 10^6/UL (ref 4–5.4)
RED CELL DISTRIBUTION WIDTH: 14.4 % (ref 11.5–14.5)
WHITE BLOOD COUNT: 11.6 10^3/UL (ref 4–10)

## 2018-04-28 RX ADMIN — ASPIRIN 81 MG CHEWABLE TABLET 1 MG: 81 TABLET CHEWABLE at 23:31

## 2018-04-28 RX ADMIN — SODIUM CHLORIDE 1 MLS/HR: 9 INJECTION, SOLUTION INTRAVENOUS at 23:35

## 2018-04-28 RX ADMIN — ONDANSETRON 1 MG: 2 INJECTION INTRAMUSCULAR; INTRAVENOUS at 23:30

## 2018-04-28 RX ADMIN — NITROGLYCERIN 1 MG: 0.4 TABLET SUBLINGUAL at 23:38

## 2018-04-28 RX ADMIN — NITROGLYCERIN 1 MG: 0.4 TABLET SUBLINGUAL at 22:50

## 2018-04-29 LAB
ALBUMIN/GLOBULIN RATIO: 0.69 (ref 1–1.93)
ALBUMIN: 2.9 GM/DL (ref 3.2–5.2)
ALKALINE PHOSPHATASE: 97 U/L (ref 45–117)
ALT SERPL W P-5'-P-CCNC: 17 U/L (ref 12–78)
ANION GAP: 6 MEQ/L (ref 8–16)
AST SERPL-CCNC: 29 U/L (ref 7–37)
BILIRUB CONJ SERPL-MCNC: 0.2 MG/DL (ref 0–0.2)
BILIRUBIN,TOTAL: 0.6 MG/DL (ref 0.2–1)
BLOOD UREA NITROGEN: 19 MG/DL (ref 7–18)
CALCIUM LEVEL: 8.1 MG/DL (ref 8.8–10.2)
CARBON DIOXIDE LEVEL: 32 MEQ/L (ref 21–32)
CHLORIDE LEVEL: 105 MEQ/L (ref 98–107)
CK MB CFR.DF SERPL CALC: 2.71
CK MB CFR.DF SERPL CALC: 2.98
CK SERPL-CCNC: 57 U/L (ref 26–192)
CK SERPL-CCNC: 59 U/L (ref 26–192)
CK-MB VALUE MASS: 1.6 NG/ML (ref ?–3.6)
CK-MB VALUE MASS: 1.7 NG/ML (ref ?–3.6)
CREATININE FOR GFR: 1 MG/DL (ref 0.55–1.3)
GFR SERPL CREATININE-BSD FRML MDRD: 56.4 ML/MIN/{1.73_M2} (ref 32–?)
GLUCOSE, FASTING: 142 MG/DL (ref 70–100)
INR: 1.99
POTASSIUM SERUM: 4 MEQ/L (ref 3.5–5.1)
PROTHROMBIN TIME: 23.3 SECONDS (ref 12.4–14.5)
SODIUM LEVEL: 143 MEQ/L (ref 136–145)
TOTAL PROTEIN: 7.1 GM/DL (ref 6.4–8.2)
TROPONIN I: < 0.02 NG/ML (ref ?–0.1)
TROPONIN I: < 0.02 NG/ML (ref ?–0.1)

## 2018-04-29 RX ADMIN — NITROGLYCERIN 1 MG: 0.4 TABLET SUBLINGUAL at 00:00

## 2018-04-29 RX ADMIN — ACETAMINOPHEN 1 MG: 325 TABLET ORAL at 02:21

## 2018-04-29 RX ADMIN — METOCLOPRAMIDE 1 MG: 5 INJECTION, SOLUTION INTRAMUSCULAR; INTRAVENOUS at 00:52

## 2019-03-24 ENCOUNTER — HOSPITAL ENCOUNTER (INPATIENT)
Dept: HOSPITAL 53 - M ED | Age: 84
LOS: 10 days | Discharge: NURSING, REHAB OR LONG-TERM CARE | DRG: 300 | End: 2019-04-03
Attending: INTERNAL MEDICINE | Admitting: HOSPITALIST
Payer: MEDICARE

## 2019-03-24 VITALS — DIASTOLIC BLOOD PRESSURE: 63 MMHG | SYSTOLIC BLOOD PRESSURE: 134 MMHG

## 2019-03-24 VITALS — HEIGHT: 64 IN | BODY MASS INDEX: 42.42 KG/M2 | WEIGHT: 248.46 LBS

## 2019-03-24 DIAGNOSIS — Z88.2: ICD-10-CM

## 2019-03-24 DIAGNOSIS — R07.89: ICD-10-CM

## 2019-03-24 DIAGNOSIS — I87.2: ICD-10-CM

## 2019-03-24 DIAGNOSIS — I48.91: ICD-10-CM

## 2019-03-24 DIAGNOSIS — G47.33: ICD-10-CM

## 2019-03-24 DIAGNOSIS — I50.32: ICD-10-CM

## 2019-03-24 DIAGNOSIS — E66.01: ICD-10-CM

## 2019-03-24 DIAGNOSIS — Z86.718: ICD-10-CM

## 2019-03-24 DIAGNOSIS — Z86.711: ICD-10-CM

## 2019-03-24 DIAGNOSIS — I27.81: ICD-10-CM

## 2019-03-24 DIAGNOSIS — E03.9: ICD-10-CM

## 2019-03-24 DIAGNOSIS — Z79.899: ICD-10-CM

## 2019-03-24 DIAGNOSIS — K21.9: ICD-10-CM

## 2019-03-24 DIAGNOSIS — I27.20: ICD-10-CM

## 2019-03-24 DIAGNOSIS — I83.008: Primary | ICD-10-CM

## 2019-03-24 DIAGNOSIS — Z91.19: ICD-10-CM

## 2019-03-24 DIAGNOSIS — Z88.0: ICD-10-CM

## 2019-03-24 LAB
ALBUMIN SERPL BCG-MCNC: 2.9 GM/DL (ref 3.2–5.2)
ALT SERPL W P-5'-P-CCNC: 12 U/L (ref 12–78)
APTT BLD: 37.6 SECONDS (ref 25.4–37.6)
BASOPHILS # BLD AUTO: 0.1 10^3/UL (ref 0–0.2)
BASOPHILS NFR BLD AUTO: 0.5 % (ref 0–1)
BILIRUB CONJ SERPL-MCNC: 0.3 MG/DL (ref 0–0.2)
BILIRUB SERPL-MCNC: 1 MG/DL (ref 0.2–1)
BUN SERPL-MCNC: 18 MG/DL (ref 7–18)
CALCIUM SERPL-MCNC: 8.1 MG/DL (ref 8.8–10.2)
CHLORIDE SERPL-SCNC: 106 MEQ/L (ref 98–107)
CK MB CFR.DF SERPL CALC: 5.22
CK MB SERPL-MCNC: 2 NG/ML (ref ?–3.6)
CK SERPL-CCNC: 46 U/L (ref 26–192)
CO2 SERPL-SCNC: 26 MEQ/L (ref 21–32)
CREAT SERPL-MCNC: 1.18 MG/DL (ref 0.55–1.3)
CRP SERPL-MCNC: 7.55 MG/DL (ref 0–0.3)
EOSINOPHIL # BLD AUTO: 0.1 10^3/UL (ref 0–0.5)
EOSINOPHIL NFR BLD AUTO: 0.9 % (ref 0–3)
ERYTHROCYTE [SEDIMENTATION RATE] IN BLOOD BY WESTERGREN METHOD: 25 MM/HR (ref 0–30)
GFR SERPL CREATININE-BSD FRML MDRD: 46.6 ML/MIN/{1.73_M2} (ref 32–?)
GLUCOSE SERPL-MCNC: 142 MG/DL (ref 70–100)
HCT VFR BLD AUTO: 41.4 % (ref 36–47)
HGB BLD-MCNC: 13.1 G/DL (ref 12–15.5)
INR PPP: 1.38
INR PPP: 1.39
LIPASE SERPL-CCNC: 74 U/L (ref 73–393)
LYMPHOCYTES # BLD AUTO: 1 10^3/UL (ref 1.5–4.5)
LYMPHOCYTES NFR BLD AUTO: 8.6 % (ref 24–44)
MCH RBC QN AUTO: 28.8 PG (ref 27–33)
MCHC RBC AUTO-ENTMCNC: 31.6 G/DL (ref 32–36.5)
MCV RBC AUTO: 91 FL (ref 80–96)
MONOCYTES # BLD AUTO: 0.6 10^3/UL (ref 0–0.8)
MONOCYTES NFR BLD AUTO: 5.4 % (ref 0–5)
NEUTROPHILS # BLD AUTO: 9.3 10^3/UL (ref 1.8–7.7)
NEUTROPHILS NFR BLD AUTO: 84 % (ref 36–66)
PLATELET # BLD AUTO: 210 10^3/UL (ref 150–450)
POTASSIUM SERPL-SCNC: 3.8 MEQ/L (ref 3.5–5.1)
PROT SERPL-MCNC: 7.7 GM/DL (ref 6.4–8.2)
PROTHROMBIN TIME: 17.2 SECONDS (ref 12.1–14.4)
PROTHROMBIN TIME: 17.3 SECONDS (ref 12.1–14.4)
RBC # BLD AUTO: 4.55 10^6/UL (ref 4–5.4)
SODIUM SERPL-SCNC: 140 MEQ/L (ref 136–145)
T4 FREE SERPL-MCNC: 1.62 NG/DL (ref 0.76–1.46)
TROPONIN I SERPL-MCNC: 0.03 NG/ML (ref ?–0.1)
TSH SERPL DL<=0.005 MIU/L-ACNC: 4.78 UIU/ML (ref 0.36–3.74)
WBC # BLD AUTO: 11.1 10^3/UL (ref 4–10)

## 2019-03-24 RX ADMIN — POTASSIUM CHLORIDE SCH MEQ: 750 TABLET, EXTENDED RELEASE ORAL at 22:22

## 2019-03-24 RX ADMIN — INSULIN LISPRO SCH UNITS: 100 INJECTION, SOLUTION INTRAVENOUS; SUBCUTANEOUS at 18:11

## 2019-03-24 RX ADMIN — TORSEMIDE SCH MG: 20 TABLET ORAL at 09:00

## 2019-03-24 RX ADMIN — ENOXAPARIN SODIUM SCH MG: 100 INJECTION SUBCUTANEOUS at 22:23

## 2019-03-24 RX ADMIN — INSULIN LISPRO SCH UNITS: 100 INJECTION, SOLUTION INTRAVENOUS; SUBCUTANEOUS at 21:00

## 2019-03-24 RX ADMIN — OMEPRAZOLE SCH MG: 20 CAPSULE, DELAYED RELEASE ORAL at 18:10

## 2019-03-24 RX ADMIN — CEFAZOLIN SODIUM SCH MLS/HR: 1 INJECTION, POWDER, FOR SOLUTION INTRAMUSCULAR; INTRAVENOUS at 22:42

## 2019-03-24 NOTE — ECGEPIP
Stationary ECG Study

                           [facility] - ED

                                       

                                       Test Date:    2019

Pat Name:     ABBEY OSMAN            Department:   

Patient ID:   [id number]                 Room:         -

Gender:       F                        Technician:   bonnie

:          1935               Requested By: DU RASMUSSEN

Order Number: [id number]     Reading MD:   Bam Farr

                                 Measurements

Intervals                              Axis          

Rate:         69                       P:            

MD:           0                        QRS:          2

QRSD:         156                      T:            43

QT:           466                                    

QTc:          502                                    

                           Interpretive Statements

ELECTRONIC VENTRICULAR PACEMAKER

SIMILAR TO 18

 

Electronically Signed On 3- 20:02:42 EDT by Bam Farr

## 2019-03-24 NOTE — HPE
DATE OF ADMISSION:  2019

 

CHIEF COMPLAINT:  Lower extremity wounds worsening with redness and drainage,

chest pain.

 

HISTORY OF PRESENT ILLNESS:  This is an 83-year-old female with past medical

history of diabetes, hypertension, hyperlipidemia, morbid obesity, peripheral

vascular disease, chronic venous insufficiency with prior deep venous thromboses

(DVTs) and pulmonary embolus (PE), obstructive sleep apnea, noncompliant with

continuous positive airway pressure (CPAP), prior history of complete heart 
block

with dual chamber pacemaker placement in 2016, who presents with chief complaint

of intermittent chest pain as well as worsening of her lower extremity 
lymphedema

which has started to become more red with drainage.  Patient reports that over

the last few days her lower extremity wounds,  which she receives wound care for

with Dr. Stillerman, have started to look worse.  They are much more

angry-looking with more redness and serous drainage.  She denies any fevers.  
She

also reports intermittent chest pain.  She is quite a poor historian when it

comes to qualifying this chest pain.  She reports that it is intermittent both

with rest and exertion and at one point today it was constant.  Right now she is

not having any chest pain.  Patient does have a history of noncompliance with 
her

Coumadin and CPAP.  Other than chest pain and lower extremity wounds, she does

not have any other symptoms.

 

REVIEW OF SYSTEMS:  Negative in 14 out of 14 systems except as noted above.

 

EMERGENCY ROOM (ER) COURSE:  The patient in the emergency room was given one 
dose

of Ancef for her lower extremity wounds.  Her troponin was checked for the chest

pain and was negative.  Her EKG did not show any acute changes.  She is being

admitted for further evaluation with the atypical chest pain as well as for

cellulitis.

 

PAST MEDICAL HISTORY:  As noted above in history of present illness (HPI).

 

PAST SURGICAL HISTORY:  The patient has a history of appendectomy,

cholecystectomy, dilatation and curettage (D and C), cataract extraction, and

carpal tunnel release surgery on the right.

 

MEDICATIONS:

Patient's home medications are:

- Tylenol 1000 mg by mouth every 6 hours as needed for pain

- Senna Plus one tablet by mouth twice a day as needed for constipation

- albuterol 2.5 mcg every 4 hours as needed for shortness of breath

- Synthroid 25 mcg by mouth daily

- Prilosec 20 mg by mouth daily

- potassium 20 mg by mouth twice a day

- torsemide 20 mg by mouth daily

- warfarin 5 mg by mouth daily

 

ALLERGIES:  She is allergic to DIRITHROMYCIN, PENICILLINS, SULFA DRUGS, SULFA

DRUG CROSS-REACTORS.

 

FAMILY HISTORY:  Reports her father  of metastatic cancer of unknown

primary.

 

SOCIAL HISTORY:  Patient currently lives in senior living housing.  She is

.  She is pretty much nonambulatory and uses a wheelchair and transfers

on her own to the bathroom or the shower.  No smoking, alcohol, or drugs.

 

PHYSICAL EXAMINATION:

On exam, her vital signs:  She is afebrile to 96.5, blood pressure 113/53, pulse

of 69, respirating 22, saturating 97% on room air.

GENERAL:  She is morbidly obese and not in any acute distress, breathing

comfortably.

CARDIOVASCULAR:  Pacemaker in place, regular rate and rhythm, no murmurs, rubs,

gallops.

LUNGS:  Clear to auscultation bilaterally.

ABDOMEN:  Obese, nontender, nondistended.

EXTREMITIES:  She has bilateral chronic venous stasis changes in her lower

extremities.  Her extremities are quite erythematous with areas of skin opening

with serous drainage.  No pus seen.  Warm to touch.

NEUROLOGIC:  She is alert and oriented times three, follows simple commands.  No

focal neurologic deficits.

PSYCHIATRIC:  Mood is stable.

 

LABORATORY DATA:  Reveal a CBC with a white count of 11, hemoglobin 13, 
platelets

of 210.  Chemistry shows potassium of 3.8, creatinine of 1.18.  Troponin 
negative

times two.  TSH 4.7, T4 1.6.

 

ASSESSMENT AND PLAN:  This is an 83-year-old female with past medical history of

diabetes, atrial fibrillation, prior history of complete heart block with

pacemaker, prior history of deep venous thrombosis (DVT) and pulmonary embolus

(PE), hypertension, pulmonary hypertension, chronic morbid obesity with

obstructive sleep apnea (LARRY) and cor pulmonale who has chronic lower extremity

venous stasis changes who presents with cellulitis and atypical chest pain.

 

1.  Cellulitis.  It appears that her chronic venous stasis changes have

exacerbated and she has a superimposed cellulitis.  She tolerated the Ancef

provided by the emergency room without any allergic response.  I am going to

continue that given that she has a penicillin allergy.  It does not look like a

purulent cellulitis so I do not think vancomycin or clindamycin is needed at 
this

time.  She should have wound care evaluate this patient and Dr. Stillerman if he

is available.

 

2.  Patient's chest pain appears atypical in nature.  I am admitting her for 
rule

out acute coronary syndrome (ACS) as well.  She will receive three troponins

total.  She is being placed on telemetry.  Given that she has a significant

cardiac history including history of chronic morbid obesity with obstructive

sleep apnea (LARRY) and cor pulmonale/right heart failure with pulmonary

hypertension and prominently dilated heart chambers, I am going to repeat an 
echo

for this patient who is coming back in with atypical chest pain.

 

3.  History of diabetes.  At this time, it does not appear she is on any home

medications for her diabetes.  I am just going to place her on a sliding scale.

 

4.  History of hypothyroidism.  Will continue her home Synthroid 25 mcg daily.

 

5.  History of atrial fibrillation.  She is on anticoagulation with warfarin.

 

6.  History of deep venous thrombosis (DVT) / pulmonary embolus (PE).  As

mentioned, this patient is on warfarin already for history of history of deep

venous thrombosis (DVT) / pulmonary embolus (PE) as well as atrial fibrillation.

She is currently subtherapeutic with an INR of 1.39.  She has a long history of

noncompliance.  Given that the anticoagulation is also for deep venous 
thrombosis

(DVT) / pulmonary embolus (PE), she will have to be bridged given that she is

subtherapeutic.  Therefore, I am going to start her on Lovenox 1 mg/kg to be

continued until her INR is greater than 2.  I am going to continue her home

warfarin schedule at 5 mg daily, but for today I will give her a dose of 7.5 mg

instead of 5.  PT/INR should be checked daily and Coumadin adjusted

accordingly.

 

7.  Deep venous thrombosis (DVT) prophylaxis.  Patient is already on Coumadin.

MTDD

## 2019-03-24 NOTE — REP
CHEST, TWO VIEWS:

 

Two views of the chest are performed and compared to several prior studies most

recently 04/28/2018.  There is cardiomegaly.  There is no evidence of acute

infiltrate.  Mediastinal silhouette is unchanged.  Left pacemaker is again noted

as well as a prosthetic heart valve.  Metallic clips are seen overlying the right

mid-lung zone.  There are degenerative changes of the spine.

 

IMPRESSION:

 

Cardiomegaly.  No acute infiltrate.

 

 

Electronically Signed by

Rishabh Noonan MD 03/24/2019 06:14 P

## 2019-03-24 NOTE — REP
CT ANGIOGRAM CHEST:

 

TECHNIQUE:  Axial contrast enhanced images from the thoracic inlet to the upper

abdomen using 100 mL Isovue 370 intravenous contrast material with multiplanar

reformations.

 

There is no CT evidence of pulmonary embolism.  There is no thoracic aortic

aneurysm or dissection.  There is mild cardiomegaly.  There is no mediastinal,

hilar, or chest wall lymphadenopathy.  There is no pleural or pericardial

effusion.  There are atherosclerotic calcifications of the thoracic aorta.  I see

no evidence of infiltrate in either lung.  There are degenerative changes of the

spine.  Multiple calcified granulomas are seen in the spleen and there is a

single calcified granuloma visualized in the right lobe of the liver.  There is a

cyst in the upper pole of the right kidney which measures approximately 2.3 cm in

diameter.

 

IMPRESSION:

 

No CT evidence of pulmonary embolism.  No acute findings identified.

 

 

Electronically Signed by

Rishabh Noonan MD 03/24/2019 06:16 P

## 2019-03-24 NOTE — REP
Bilateral lower extremity Duplex Doppler venous ultrasound:

 

Real time compression and duplex Doppler interrogation of the bilateral lower

extremity deep venous system is performed.  Bilaterally, the common femoral,

superficial femoral and popliteal veins are fully compressible with transducer

pressure and demonstrate normal spontaneous and phasic flow, without evidence of

deep venous thrombosis.

 

Impression:

 

No evidence of deep venous thrombosis of the bilateral lower extremity femoral

popliteal venous system.

 

 

Electronically Signed by

Rishabh Noonan MD 03/24/2019 12:50 P

## 2019-03-25 VITALS — SYSTOLIC BLOOD PRESSURE: 132 MMHG | DIASTOLIC BLOOD PRESSURE: 65 MMHG

## 2019-03-25 VITALS — DIASTOLIC BLOOD PRESSURE: 63 MMHG | SYSTOLIC BLOOD PRESSURE: 137 MMHG

## 2019-03-25 VITALS — DIASTOLIC BLOOD PRESSURE: 68 MMHG | SYSTOLIC BLOOD PRESSURE: 137 MMHG

## 2019-03-25 VITALS — SYSTOLIC BLOOD PRESSURE: 124 MMHG | DIASTOLIC BLOOD PRESSURE: 59 MMHG

## 2019-03-25 VITALS — SYSTOLIC BLOOD PRESSURE: 145 MMHG | DIASTOLIC BLOOD PRESSURE: 63 MMHG

## 2019-03-25 VITALS — SYSTOLIC BLOOD PRESSURE: 130 MMHG | DIASTOLIC BLOOD PRESSURE: 62 MMHG

## 2019-03-25 VITALS — SYSTOLIC BLOOD PRESSURE: 120 MMHG | DIASTOLIC BLOOD PRESSURE: 59 MMHG

## 2019-03-25 VITALS — SYSTOLIC BLOOD PRESSURE: 181 MMHG | DIASTOLIC BLOOD PRESSURE: 80 MMHG

## 2019-03-25 LAB
BUN SERPL-MCNC: 19 MG/DL (ref 7–18)
CALCIUM SERPL-MCNC: 7.7 MG/DL (ref 8.8–10.2)
CHLORIDE SERPL-SCNC: 107 MEQ/L (ref 98–107)
CO2 SERPL-SCNC: 28 MEQ/L (ref 21–32)
CREAT SERPL-MCNC: 1.16 MG/DL (ref 0.55–1.3)
GFR SERPL CREATININE-BSD FRML MDRD: 47.5 ML/MIN/{1.73_M2} (ref 32–?)
GLUCOSE SERPL-MCNC: 129 MG/DL (ref 70–100)
HCT VFR BLD AUTO: 36 % (ref 36–47)
HGB BLD-MCNC: 11.3 G/DL (ref 12–15.5)
INR PPP: 1.43
MCH RBC QN AUTO: 28.7 PG (ref 27–33)
MCHC RBC AUTO-ENTMCNC: 31.4 G/DL (ref 32–36.5)
MCV RBC AUTO: 91.4 FL (ref 80–96)
PLATELET # BLD AUTO: 177 10^3/UL (ref 150–450)
POTASSIUM SERPL-SCNC: 3.9 MEQ/L (ref 3.5–5.1)
PROTHROMBIN TIME: 17.7 SECONDS (ref 12.1–14.4)
RBC # BLD AUTO: 3.94 10^6/UL (ref 4–5.4)
SODIUM SERPL-SCNC: 141 MEQ/L (ref 136–145)
WBC # BLD AUTO: 6.8 10^3/UL (ref 4–10)

## 2019-03-25 RX ADMIN — POTASSIUM CHLORIDE SCH MEQ: 750 TABLET, EXTENDED RELEASE ORAL at 09:21

## 2019-03-25 RX ADMIN — OMEPRAZOLE SCH MG: 20 CAPSULE, DELAYED RELEASE ORAL at 09:21

## 2019-03-25 RX ADMIN — NYSTATIN SCH DOSE: 100000 POWDER TOPICAL at 21:49

## 2019-03-25 RX ADMIN — INSULIN LISPRO SCH UNITS: 100 INJECTION, SOLUTION INTRAVENOUS; SUBCUTANEOUS at 18:09

## 2019-03-25 RX ADMIN — ENOXAPARIN SODIUM SCH MG: 100 INJECTION SUBCUTANEOUS at 21:47

## 2019-03-25 RX ADMIN — LEVOTHYROXINE SODIUM SCH MCG: 25 TABLET ORAL at 06:11

## 2019-03-25 RX ADMIN — INSULIN LISPRO SCH UNITS: 100 INJECTION, SOLUTION INTRAVENOUS; SUBCUTANEOUS at 14:02

## 2019-03-25 RX ADMIN — WARFARIN SODIUM SCH MG: 5 TABLET ORAL at 17:04

## 2019-03-25 RX ADMIN — ACETAMINOPHEN PRN MG: 325 TABLET ORAL at 01:01

## 2019-03-25 RX ADMIN — TORSEMIDE SCH MG: 20 TABLET ORAL at 09:21

## 2019-03-25 RX ADMIN — INSULIN LISPRO SCH UNITS: 100 INJECTION, SOLUTION INTRAVENOUS; SUBCUTANEOUS at 21:38

## 2019-03-25 RX ADMIN — CEFAZOLIN SODIUM SCH MLS/HR: 1 INJECTION, POWDER, FOR SOLUTION INTRAMUSCULAR; INTRAVENOUS at 15:56

## 2019-03-25 RX ADMIN — CEFAZOLIN SODIUM SCH MLS/HR: 1 INJECTION, POWDER, FOR SOLUTION INTRAMUSCULAR; INTRAVENOUS at 06:11

## 2019-03-25 RX ADMIN — POTASSIUM CHLORIDE SCH MEQ: 750 TABLET, EXTENDED RELEASE ORAL at 21:47

## 2019-03-25 RX ADMIN — ACETAMINOPHEN PRN MG: 325 TABLET ORAL at 15:58

## 2019-03-25 RX ADMIN — ACETAMINOPHEN PRN MG: 325 TABLET ORAL at 21:48

## 2019-03-25 RX ADMIN — INSULIN LISPRO SCH UNITS: 100 INJECTION, SOLUTION INTRAVENOUS; SUBCUTANEOUS at 09:20

## 2019-03-25 RX ADMIN — NYSTATIN SCH DOSE: 100000 POWDER TOPICAL at 09:20

## 2019-03-25 RX ADMIN — ALBUTEROL SULFATE PRN MG: 2.5 SOLUTION RESPIRATORY (INHALATION) at 16:48

## 2019-03-25 RX ADMIN — ACETAMINOPHEN PRN MG: 325 TABLET ORAL at 06:20

## 2019-03-25 NOTE — ECHO
DATE OF PROCEDURE:  03/25/2019

 

REFERRING PHYSICIAN:  Dr. Odilia Tucker

INDICATION:  Chest pain, unspecified.

 

2D MEASUREMENTS:

Ventricular septum:  1.00 cm

Posterior wall:  1.04 cm

Left ventricle diastole:  4.0 cm

Aortic annulus:  1.6 cm

Inferior vena cava:  2.4 cm

 

DOPPLER MEASUREMENTS:

Aortic valve velocity:  257 cm/s

Aortic valve VTI:  52.6 cm

Peak aortic valve gradient:  26 mmHg

Mean aortic valve gradient:  14 mmHg

LVOT velocity:  119 cm/s

LVOT VTI:  27.3 cm

Mild mitral regurgitation.

Mild mitral stenosis.

Mitral E velocity:  196 cm/s

Mitral deceleration time:  377 ms (pulse wave).

Moderate tricuspid regurgitation.

Estimated right ventricle systolic pressure:  At least 80 mmHg assuming a right

atrial pressure of at least 20 mmHg.

Trace pulmonic regurgitation.

 

DESCRIPTION: Underlying atrial fibrillation, ventricular paced rhythm. Image

quality was fair. This was a 2D, M-mode, color flow Doppler and pulse wave

Doppler examination and included mitral annular tissue Doppler.

 

CONCLUSIONS:

1. Normal left ventricle wall thickness. Paradoxical septal motion with normal

regional left ventricular (LV) wall motion and wall thickening elsewhere. Partial

flattening of the curvature of the ventricular septum in systole in keeping with

pressure overload of the right ventricle. Unable to assess LV diastolic function

adequately in the setting of atrial fibrillation and mitral stenosis.

 

2. Severe elevation of estimated right ventricle systolic pressure (at least 80

mmHg) assuming right atrial pressure of at least 20 mmHg based on inferior vena

cava dilatation with marked reduction of respiratory variation. Mild right

ventricle dilatation with preserved right ventricular (RV) systolic function. At

least moderate right atrial dilatation. Moderate tricuspid regurgitation.

 

3. Suggestive of elevated central venous pressure of at least 20 mmHg. Inferior

vena cava plethora.

 

4. No pericardial effusion.

 

5. Severe left atrial dilatation.

 

6. Severe mitral annular calcification. Mild mitral stenosis and mild mitral

regurgitation.

 

7. Status post transcatheter aortic valve bioprosthesis. No aortic stenosis or

regurgitation.

 

8. Endocardial cardiac rhythm management leads seen in the atria and the right

ventricle.

## 2019-03-25 NOTE — IPN
DATE: 03/25/2019

 

SUBJECTIVE: The patient tells me that she is feeling better today. She tells me

she is no longer having any shortness of breath. She is not having anymore chest

pain. She denies any fevers, chills, lightheadedness, or dizziness.

 

OBJECTIVE:

VITAL SIGNS: Temperature 97.5, pulse 70, respiratory rate 18, blood pressure

137/68, oxygen saturation 95% on room air.

GENERAL: She is a morbidly obese, elderly,  female sitting on the edge

of her bed. She does not appear to be in any acute distress.

NEUROLOGIC: Cranial nerves II-XII are grossly intact.

HEENT: No elevation of central venous pressure (CVP).

CARDIOVASCULAR: S1, S2, regular.

RESPIRATORY: Clear.

ABDOMEN: Obese.

EXTREMITIES: She has chronic venous stasis and chronic lymphedema bilaterally.

She does have some weeping wounds in the pretibial area on the right and left

scaling throughout. However, no obvious areas of cellulitis or infection.

 

LABORATORY STUDIES: WBC 6.8, hemoglobin 11.3, platelet count 177.

 

Chemistry Panel: Sodium 141, potassium 3.9, chloride 107, bicarbonate 28, BUN 19,

creatinine 1.1. Multiple sets of cardiac enzymes are negative. A TSH was slightly

elevated at 4.7. INR is 1.3.

 

No microbiology.

 

IMAGING STUDIES: The patient did have a CT angiography of the chest that did not

reveal any evidence of any PE.

 

She also had a vascular ultrasound that revealed no evidence of any DVT.

 

ASSESSMENT AND PLAN: This is an 83-year-old female who presented with chest pain.

 

 

1. Chest pain, likely noncardiac in nature. It appears to have spontaneously

resolved. I suspect it may have been more likely related to gastrointestinal (GI)

with some reflux. Multiple sets of cardiac enzymes are negative. She does have a

fairly significant complex cardiac history involving a transcatheter aortic valve

replacement (TAVR), left atrial dilation, diastolic congestive heart failure. As

such, an echocardiogram has been ordered for her though my suspicion for a

cardiac etiology is quite low at this time.

 

2. Cellulitis. To me, there does not appear to be any active or acute cellulitis.

She has been started cephazolin. I will place an advanced wound care consult with

Dr. Stillerman who follows the patient's wounds regularly. He may be able to

confirm this for us and if so, we could potentially discontinue antibiotics and

we will check a procalcitonin and have her work with occupational therapy as well

as physical therapy.

 

3. Obstructive sleep apnea, noncompliant.

 

4. Obesity, complicating care.

 

5. Hypothyroidism. She is on Synthroid. Consider rechecking when her acute

medical illness has resolved.

 

6. Atrial fibrillation. She has a history of complete heart block and status post

pacer with dilated left atrium. She does not require any rate controlling agents.

She is anticoagulated with Coumadin 5 mg daily. She is subtherapeutic and was

started on a Lovenox bridge. I do not feel strongly about this.

 

7. History of deep vein thrombosis (DVT) and pulmonary embolism (PE). She is

placed on a Lovenox bridge with the concern for this past medical history.

However, this morning, it appears that we have definitively excluded these as

active processes. She has a history of them. I do not know the nature of her

hypercoagulable state. However, as such for the time being, I will continue her

on the Lovenox bridge until she is therapeutic with her Coumadin. She certainly

would not need to remain in the hospital for only this purpose.

 

8. History of diastolic congestive heart failure. Continue on torsemide. She

appears to be intravascularly euvolemic. She does have chronic venous stasis and

chronic lymphedema of bilateral lower extremities. Her blood pressure is

optimized.

 

9. Gastroesophageal reflux disease. She is on omeprazole. I suspect that this is

more than likely the etiology for her chest pain that she presented with.

 

DISPOSITION: Pending wound care evaluation and echocardiogram.

## 2019-03-26 VITALS — DIASTOLIC BLOOD PRESSURE: 56 MMHG | SYSTOLIC BLOOD PRESSURE: 111 MMHG

## 2019-03-26 VITALS — DIASTOLIC BLOOD PRESSURE: 68 MMHG | SYSTOLIC BLOOD PRESSURE: 120 MMHG

## 2019-03-26 VITALS — DIASTOLIC BLOOD PRESSURE: 67 MMHG | SYSTOLIC BLOOD PRESSURE: 145 MMHG

## 2019-03-26 VITALS — SYSTOLIC BLOOD PRESSURE: 145 MMHG | DIASTOLIC BLOOD PRESSURE: 65 MMHG

## 2019-03-26 VITALS — DIASTOLIC BLOOD PRESSURE: 67 MMHG | SYSTOLIC BLOOD PRESSURE: 143 MMHG

## 2019-03-26 VITALS — DIASTOLIC BLOOD PRESSURE: 60 MMHG | SYSTOLIC BLOOD PRESSURE: 129 MMHG

## 2019-03-26 LAB
INR PPP: 1.66
PROTHROMBIN TIME: 19.9 SECONDS (ref 12.1–14.4)

## 2019-03-26 RX ADMIN — TORSEMIDE SCH MG: 20 TABLET ORAL at 09:48

## 2019-03-26 RX ADMIN — POTASSIUM CHLORIDE SCH MEQ: 750 TABLET, EXTENDED RELEASE ORAL at 22:06

## 2019-03-26 RX ADMIN — POTASSIUM CHLORIDE SCH MEQ: 750 TABLET, EXTENDED RELEASE ORAL at 09:48

## 2019-03-26 RX ADMIN — OMEPRAZOLE SCH MG: 20 CAPSULE, DELAYED RELEASE ORAL at 09:48

## 2019-03-26 RX ADMIN — CEFAZOLIN SODIUM SCH MLS/HR: 1 INJECTION, POWDER, FOR SOLUTION INTRAMUSCULAR; INTRAVENOUS at 00:19

## 2019-03-26 RX ADMIN — ACETAMINOPHEN PRN MG: 325 TABLET ORAL at 22:09

## 2019-03-26 RX ADMIN — INSULIN LISPRO SCH UNITS: 100 INJECTION, SOLUTION INTRAVENOUS; SUBCUTANEOUS at 22:06

## 2019-03-26 RX ADMIN — LEVOTHYROXINE SODIUM SCH MCG: 25 TABLET ORAL at 06:17

## 2019-03-26 RX ADMIN — TORSEMIDE SCH MG: 20 TABLET ORAL at 22:06

## 2019-03-26 RX ADMIN — CEFAZOLIN SODIUM SCH MLS/HR: 1 INJECTION, POWDER, FOR SOLUTION INTRAMUSCULAR; INTRAVENOUS at 22:08

## 2019-03-26 RX ADMIN — INSULIN LISPRO SCH UNITS: 100 INJECTION, SOLUTION INTRAVENOUS; SUBCUTANEOUS at 14:03

## 2019-03-26 RX ADMIN — ALBUTEROL SULFATE PRN MG: 2.5 SOLUTION RESPIRATORY (INHALATION) at 17:39

## 2019-03-26 RX ADMIN — ENOXAPARIN SODIUM SCH MG: 100 INJECTION SUBCUTANEOUS at 22:07

## 2019-03-26 RX ADMIN — INSULIN LISPRO SCH UNITS: 100 INJECTION, SOLUTION INTRAVENOUS; SUBCUTANEOUS at 09:48

## 2019-03-26 RX ADMIN — INSULIN LISPRO SCH UNITS: 100 INJECTION, SOLUTION INTRAVENOUS; SUBCUTANEOUS at 17:30

## 2019-03-26 RX ADMIN — CEFAZOLIN SODIUM SCH MLS/HR: 1 INJECTION, POWDER, FOR SOLUTION INTRAMUSCULAR; INTRAVENOUS at 14:03

## 2019-03-26 RX ADMIN — WARFARIN SODIUM SCH MG: 5 TABLET ORAL at 17:37

## 2019-03-26 RX ADMIN — NYSTATIN SCH DOSE: 100000 POWDER TOPICAL at 22:07

## 2019-03-26 RX ADMIN — CEFAZOLIN SODIUM SCH MLS/HR: 1 INJECTION, POWDER, FOR SOLUTION INTRAMUSCULAR; INTRAVENOUS at 06:18

## 2019-03-26 RX ADMIN — NYSTATIN SCH DOSE: 100000 POWDER TOPICAL at 09:49

## 2019-03-26 NOTE — IPNPDOC
Text Note


Date of Service


The patient was seen on 3/26/19.





NOTE


Subjective:


Patient is an 83-year-old  female with a PMHx of DM2, A. fib, Complete 

HB s/p PM, Hx of DVT / PE, HTN, Pulmonary HTN, Chronic Morbid obesity, LARRY, Cor 

pulmonale, Chronic LE venous stasis ulcer who presented to the ER with comp

laints of atypical chest pain and cellulitis of both her lower extremities. She 

was admitted to the hospital service for further evaluation and treatment.





Patient was seen and examined at the bedside. Currently patient notes that her 

chest pain is resolved. She denies any palpitations. Denies any difficulty with 

breathing. Denies nausea, vomiting, abdominal pain, constipation, diarrhea or 

discomfort with urination. Patient does report that her legs appear to be 

slightly improved, however, still appeared to be red compared to what she is 

used to having at baseline.





Objective:


Vitals (See below)


General: Lying in bed, no acute distress, comfortable, AAOx3


HEENT: NC, AT


CVS: RRR, +S1S2


Lungs: Fair air entry b/l, -w/r/r


Abdomen: Soft, ND, NT


Extremities: Appears to have chronic venous stasis changes, bilateral erythema, 

warmth and tenderness noted





Assessment and plan:


s/p Atypical chest pain - unlikely 2/2 cardiac etiology, possibly 2/2 GI 

etiology 


- Patient has full resolution of chest pain


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


- EKG 3/24: Electronic ventricular pacemaker - similar to 4/29/18


- Troponin trend x 3 negative





Redness / Warmth / Tenderness - possibly 2/2 cellulitis, possibly 2/2 chronic 

venous stasis ulcer


- Clinically appears to be chronic in nature


- She remains afebrile and hemodynamically stable


- s/p leukocytosis


- Procalcitonin has been negative


- Will DC Cefazolin 


- Dr. Stillerman on consult


- c/w PT & OT





LARRY


- non-compliant with CPAP





Obestiy


- complicating medical care


 


Hypothyroidism


- c/w Levothyroxine 


 


Atrial fibrillation


- Currently not on rate control medications (re: Hx of Complete heart block s/p 

PM)


- INR subtherapeutic 


- c/w full anticoagulation with Coumadin and Lovenox 





DVT / PE 


- INR subtherapeutic 


- c/w Lovenox and Coumadin 





Possible decompensated Diastolic CHF


- ECHO 3/25: Paradoxical septal motion with normal LV wall motion, findings to 

suggest RV pressure overload, unable to assess LV diastolic function, severe 

elevation of RVSP


- Will increase dose of Torsemide from QD to TID





GERD


- c/w Omeprazole 





DVT prophylaxis


- c/w Warfarin and Lovenox 





Disposition:


- Pending wound care evaluation





VS,Fishbone, I+O


VS, Fishbone, I+O





Vital Signs








  Date Time  Temp Pulse Resp B/P (MAP) Pulse Ox O2 Delivery O2 Flow Rate FiO2


 


3/26/19 14:00 97.6 70 18 145/67 (93) 100   


 


3/24/19 20:30      Room Air  














I&O- Last 24 Hours up to 6 AM 


 


 3/26/19





 06:00


 


Intake Total 1100 ml


 


Output Total 1650 ml


 


Balance -550 ml

















MARLA FLORES MD                Mar 26, 2019 16:03

## 2019-03-27 VITALS — DIASTOLIC BLOOD PRESSURE: 68 MMHG | SYSTOLIC BLOOD PRESSURE: 125 MMHG

## 2019-03-27 VITALS — SYSTOLIC BLOOD PRESSURE: 108 MMHG | DIASTOLIC BLOOD PRESSURE: 56 MMHG

## 2019-03-27 VITALS — SYSTOLIC BLOOD PRESSURE: 130 MMHG | DIASTOLIC BLOOD PRESSURE: 64 MMHG

## 2019-03-27 VITALS — DIASTOLIC BLOOD PRESSURE: 60 MMHG | SYSTOLIC BLOOD PRESSURE: 129 MMHG

## 2019-03-27 VITALS — DIASTOLIC BLOOD PRESSURE: 63 MMHG | SYSTOLIC BLOOD PRESSURE: 139 MMHG

## 2019-03-27 VITALS — SYSTOLIC BLOOD PRESSURE: 129 MMHG | DIASTOLIC BLOOD PRESSURE: 63 MMHG

## 2019-03-27 LAB
BASOPHILS # BLD AUTO: 0.1 10^3/UL (ref 0–0.2)
BASOPHILS NFR BLD AUTO: 0.9 % (ref 0–1)
BUN SERPL-MCNC: 18 MG/DL (ref 7–18)
CALCIUM SERPL-MCNC: 7.7 MG/DL (ref 8.8–10.2)
CHLORIDE SERPL-SCNC: 105 MEQ/L (ref 98–107)
CO2 SERPL-SCNC: 27 MEQ/L (ref 21–32)
CREAT SERPL-MCNC: 1.13 MG/DL (ref 0.55–1.3)
EOSINOPHIL # BLD AUTO: 0.1 10^3/UL (ref 0–0.5)
EOSINOPHIL NFR BLD AUTO: 1.9 % (ref 0–3)
GFR SERPL CREATININE-BSD FRML MDRD: 49 ML/MIN/{1.73_M2} (ref 32–?)
GLUCOSE SERPL-MCNC: 123 MG/DL (ref 70–100)
HCT VFR BLD AUTO: 35.8 % (ref 36–47)
HGB BLD-MCNC: 11.4 G/DL (ref 12–15.5)
INR PPP: 2.02
LYMPHOCYTES # BLD AUTO: 2 10^3/UL (ref 1.5–4.5)
LYMPHOCYTES NFR BLD AUTO: 27.4 % (ref 24–44)
MCH RBC QN AUTO: 29.2 PG (ref 27–33)
MCHC RBC AUTO-ENTMCNC: 31.8 G/DL (ref 32–36.5)
MCV RBC AUTO: 91.8 FL (ref 80–96)
MONOCYTES # BLD AUTO: 0.5 10^3/UL (ref 0–0.8)
MONOCYTES NFR BLD AUTO: 7.3 % (ref 0–5)
NEUTROPHILS # BLD AUTO: 4.6 10^3/UL (ref 1.8–7.7)
NEUTROPHILS NFR BLD AUTO: 62 % (ref 36–66)
PLATELET # BLD AUTO: 180 10^3/UL (ref 150–450)
POTASSIUM SERPL-SCNC: 3.9 MEQ/L (ref 3.5–5.1)
PROTHROMBIN TIME: 23.2 SECONDS (ref 12.1–14.4)
RBC # BLD AUTO: 3.9 10^6/UL (ref 4–5.4)
SODIUM SERPL-SCNC: 139 MEQ/L (ref 136–145)
WBC # BLD AUTO: 7.4 10^3/UL (ref 4–10)

## 2019-03-27 RX ADMIN — INSULIN LISPRO SCH UNITS: 100 INJECTION, SOLUTION INTRAVENOUS; SUBCUTANEOUS at 13:59

## 2019-03-27 RX ADMIN — Medication SCH DOSE: at 16:16

## 2019-03-27 RX ADMIN — CEFAZOLIN SODIUM SCH MLS/HR: 1 INJECTION, POWDER, FOR SOLUTION INTRAMUSCULAR; INTRAVENOUS at 16:17

## 2019-03-27 RX ADMIN — ACETAMINOPHEN PRN MG: 325 TABLET ORAL at 11:42

## 2019-03-27 RX ADMIN — TORSEMIDE SCH MG: 20 TABLET ORAL at 09:23

## 2019-03-27 RX ADMIN — POTASSIUM CHLORIDE SCH MEQ: 750 TABLET, EXTENDED RELEASE ORAL at 09:23

## 2019-03-27 RX ADMIN — LEVOTHYROXINE SODIUM SCH MCG: 25 TABLET ORAL at 06:49

## 2019-03-27 RX ADMIN — ALBUTEROL SULFATE PRN MG: 2.5 SOLUTION RESPIRATORY (INHALATION) at 01:02

## 2019-03-27 RX ADMIN — NYSTATIN SCH DOSE: 100000 POWDER TOPICAL at 21:00

## 2019-03-27 RX ADMIN — INSULIN LISPRO SCH UNITS: 100 INJECTION, SOLUTION INTRAVENOUS; SUBCUTANEOUS at 09:22

## 2019-03-27 RX ADMIN — INSULIN LISPRO SCH UNITS: 100 INJECTION, SOLUTION INTRAVENOUS; SUBCUTANEOUS at 18:37

## 2019-03-27 RX ADMIN — OMEPRAZOLE SCH MG: 20 CAPSULE, DELAYED RELEASE ORAL at 09:22

## 2019-03-27 RX ADMIN — ALBUTEROL SULFATE PRN MG: 2.5 SOLUTION RESPIRATORY (INHALATION) at 07:32

## 2019-03-27 RX ADMIN — INSULIN LISPRO SCH UNITS: 100 INJECTION, SOLUTION INTRAVENOUS; SUBCUTANEOUS at 21:00

## 2019-03-27 RX ADMIN — POTASSIUM CHLORIDE SCH MEQ: 750 TABLET, EXTENDED RELEASE ORAL at 20:22

## 2019-03-27 RX ADMIN — CEFAZOLIN SODIUM SCH MLS/HR: 1 INJECTION, POWDER, FOR SOLUTION INTRAMUSCULAR; INTRAVENOUS at 06:49

## 2019-03-27 RX ADMIN — WARFARIN SODIUM SCH MG: 5 TABLET ORAL at 18:37

## 2019-03-27 RX ADMIN — NYSTATIN SCH DOSE: 100000 POWDER TOPICAL at 09:23

## 2019-03-27 RX ADMIN — TORSEMIDE SCH MG: 20 TABLET ORAL at 20:25

## 2019-03-27 RX ADMIN — ENOXAPARIN SODIUM SCH MG: 100 INJECTION SUBCUTANEOUS at 20:22

## 2019-03-27 NOTE — IPNPDOC
Text Note


Date of Service


The patient was seen on 3/27/19.





NOTE


Subjective:


Patient is an 83-year-old  female with a PMHx of DM2, A. fib, Complete 

HB s/p PM, Hx of DVT / PE, HTN, Pulmonary HTN, Chronic Morbid obesity, LARRY, Cor 

pulmonale, Chronic LE venous stasis ulcer who presented to the ER with 

complaints of atypical chest pain and cellulitis of both her lower extremities. 

She was admitted to the hospital service for further evaluation and treatment.





Patient was seen and examined at the bedside. Currently patient denies any 

nausea, vomiting, abdominal pain, C/D. Denies any dysuria. Patient still 

complains of b/l leg pain. Denies any CP, SOB or palpitations. 





Objective:


Vitals (See below)


General: Lying in bed, no acute distress, comfortable, AAOx3


HEENT: NC, AT


CVS: RRR, +S1S2


Lungs: Fair air entry b/l, auscultations without rhonchi, rales or wheezing


Abdomen: Soft, morbidly obese, without any distention or tenderness


Extremities: Appears to have chronic venous stasis changes, bilateral erythema, 

warmth and tenderness noted





Assessment and plan:


s/p Atypical chest pain - unlikely 2/2 cardiac etiology, possibly 2/2 GI 

etiology 


- Patient has full resolution of chest pain


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


- EKG 3/24: Electronic ventricular pacemaker - similar to 4/29/18


- Troponin trend x 3 negative





Redness / Warmth / Tenderness - possibly 2/2 cellulitis, possibly 2/2 chronic 

venous stasis ulcer


- Clinically appears to be chronic in nature


- She remains afebrile and hemodynamically stable


- s/p leukocytosis


- Procalcitonin has been negative


- Cefazolin has been discontinued 


- c/w Wound care as per recommendations from Dr. Stillerman 


- c/w PT & OT





LARRY


- non-compliant with CPAP





Obestiy


- complicating medical care


 


Hypothyroidism


- c/w Levothyroxine 


 


Atrial fibrillation


- Currently not on rate control medications (re: Hx of Complete heart block s/p 

PM)


- INR subtherapeutic 


- c/w full anticoagulation with Coumadin and Lovenox 





DVT / PE 


- INR subtherapeutic 


- c/w Lovenox and Coumadin 





Possible decompensated Diastolic CHF


- ECHO 3/25: Paradoxical septal motion with normal LV wall motion, findings to 

suggest RV pressure overload, unable to assess LV diastolic function, severe 

elevation of RVSP


- c/w Torsemide BID 





GERD


- c/w Omeprazole 





DVT prophylaxis


- c/w Warfarin and Lovenox 





Disposition:


- c/w Diuresis


- DC Abx





VS,Fishbone, I+O


VS, Fishbone, I+O


Laboratory Tests


3/27/19 08:03








Red Blood Count 3.90 L, Mean Corpuscular Volume 91.8, Mean Corpuscular 

Hemoglobin 29.2, Mean Corpuscular Hemoglobin Concent 31.8 L, Red Cell 

Distribution Width 15.7 H, Neutrophils (%) (Auto) 62.0, Lymphocytes (%) (Auto) 

27.4, Monocytes (%) (Auto) 7.3 H, Eosinophils (%) (Auto) 1.9, Basophils (%) 

(Auto) 0.9, Neutrophils # (Auto) 4.6, Lymphocytes # (Auto) 2.0, Monocytes # 

(Auto) 0.5, Eosinophils # (Auto) 0.1, Basophils # (Auto) 0.1, Calcium Level 7.7 

L








Vital Signs








  Date Time  Temp Pulse Resp B/P (MAP) Pulse Ox O2 Delivery O2 Flow Rate FiO2


 


3/27/19 14:00 98.4 73 18 130/64 (86) 98   


 


3/24/19 20:30      Room Air  














I&O- Last 24 Hours up to 6 AM 


 


 3/27/19





 06:00


 


Intake Total 1300 ml


 


Output Total 2075 ml


 


Balance -775 ml

















MARLA FLORES MD                Mar 27, 2019 17:15

## 2019-03-28 VITALS — SYSTOLIC BLOOD PRESSURE: 141 MMHG | DIASTOLIC BLOOD PRESSURE: 62 MMHG

## 2019-03-28 VITALS — DIASTOLIC BLOOD PRESSURE: 75 MMHG | SYSTOLIC BLOOD PRESSURE: 134 MMHG

## 2019-03-28 VITALS — SYSTOLIC BLOOD PRESSURE: 125 MMHG | DIASTOLIC BLOOD PRESSURE: 58 MMHG

## 2019-03-28 VITALS — DIASTOLIC BLOOD PRESSURE: 85 MMHG | SYSTOLIC BLOOD PRESSURE: 127 MMHG

## 2019-03-28 VITALS — DIASTOLIC BLOOD PRESSURE: 58 MMHG | SYSTOLIC BLOOD PRESSURE: 138 MMHG

## 2019-03-28 VITALS — DIASTOLIC BLOOD PRESSURE: 63 MMHG | SYSTOLIC BLOOD PRESSURE: 141 MMHG

## 2019-03-28 LAB
BASOPHILS # BLD AUTO: 0.1 10^3/UL (ref 0–0.2)
BASOPHILS NFR BLD AUTO: 0.9 % (ref 0–1)
BUN SERPL-MCNC: 18 MG/DL (ref 7–18)
CALCIUM SERPL-MCNC: 8 MG/DL (ref 8.8–10.2)
CHLORIDE SERPL-SCNC: 103 MEQ/L (ref 98–107)
CO2 SERPL-SCNC: 29 MEQ/L (ref 21–32)
CREAT SERPL-MCNC: 1.02 MG/DL (ref 0.55–1.3)
EOSINOPHIL # BLD AUTO: 0.2 10^3/UL (ref 0–0.5)
EOSINOPHIL NFR BLD AUTO: 2.5 % (ref 0–3)
GFR SERPL CREATININE-BSD FRML MDRD: 55.1 ML/MIN/{1.73_M2} (ref 32–?)
GLUCOSE SERPL-MCNC: 122 MG/DL (ref 70–100)
HCT VFR BLD AUTO: 36.1 % (ref 36–47)
HGB BLD-MCNC: 11.6 G/DL (ref 12–15.5)
INR PPP: 2.15
LYMPHOCYTES # BLD AUTO: 1.6 10^3/UL (ref 1.5–4.5)
LYMPHOCYTES NFR BLD AUTO: 21.2 % (ref 24–44)
MAGNESIUM SERPL-MCNC: 1.4 MG/DL (ref 1.8–2.4)
MCH RBC QN AUTO: 29.1 PG (ref 27–33)
MCHC RBC AUTO-ENTMCNC: 32.1 G/DL (ref 32–36.5)
MCV RBC AUTO: 90.7 FL (ref 80–96)
MONOCYTES # BLD AUTO: 0.6 10^3/UL (ref 0–0.8)
MONOCYTES NFR BLD AUTO: 8.2 % (ref 0–5)
NEUTROPHILS # BLD AUTO: 5.1 10^3/UL (ref 1.8–7.7)
NEUTROPHILS NFR BLD AUTO: 66.7 % (ref 36–66)
PLATELET # BLD AUTO: 195 10^3/UL (ref 150–450)
POTASSIUM SERPL-SCNC: 4 MEQ/L (ref 3.5–5.1)
PROTHROMBIN TIME: 24.4 SECONDS (ref 12.1–14.4)
RBC # BLD AUTO: 3.98 10^6/UL (ref 4–5.4)
SODIUM SERPL-SCNC: 140 MEQ/L (ref 136–145)
WBC # BLD AUTO: 7.6 10^3/UL (ref 4–10)

## 2019-03-28 RX ADMIN — INSULIN LISPRO SCH UNITS: 100 INJECTION, SOLUTION INTRAVENOUS; SUBCUTANEOUS at 21:00

## 2019-03-28 RX ADMIN — GUAIFENESIN PRN ML: 200 SOLUTION ORAL at 17:21

## 2019-03-28 RX ADMIN — ALBUTEROL SULFATE PRN MG: 2.5 SOLUTION RESPIRATORY (INHALATION) at 05:26

## 2019-03-28 RX ADMIN — ACETAMINOPHEN PRN MG: 325 TABLET ORAL at 05:47

## 2019-03-28 RX ADMIN — POTASSIUM CHLORIDE SCH MEQ: 750 TABLET, EXTENDED RELEASE ORAL at 08:53

## 2019-03-28 RX ADMIN — INSULIN LISPRO SCH UNITS: 100 INJECTION, SOLUTION INTRAVENOUS; SUBCUTANEOUS at 17:16

## 2019-03-28 RX ADMIN — NYSTATIN SCH DOSE: 100000 POWDER TOPICAL at 21:25

## 2019-03-28 RX ADMIN — Medication SCH DOSE: at 08:53

## 2019-03-28 RX ADMIN — WARFARIN SODIUM SCH MG: 5 TABLET ORAL at 17:15

## 2019-03-28 RX ADMIN — MAGNESIUM SULFATE IN DEXTROSE SCH MLS/HR: 10 INJECTION, SOLUTION INTRAVENOUS at 10:04

## 2019-03-28 RX ADMIN — TORSEMIDE SCH MG: 20 TABLET ORAL at 05:46

## 2019-03-28 RX ADMIN — POTASSIUM CHLORIDE SCH MEQ: 750 TABLET, EXTENDED RELEASE ORAL at 21:21

## 2019-03-28 RX ADMIN — OMEPRAZOLE SCH MG: 20 CAPSULE, DELAYED RELEASE ORAL at 08:53

## 2019-03-28 RX ADMIN — INSULIN LISPRO SCH UNITS: 100 INJECTION, SOLUTION INTRAVENOUS; SUBCUTANEOUS at 08:52

## 2019-03-28 RX ADMIN — MAGNESIUM SULFATE IN DEXTROSE SCH MLS/HR: 10 INJECTION, SOLUTION INTRAVENOUS at 08:52

## 2019-03-28 RX ADMIN — ALBUTEROL SULFATE PRN MG: 2.5 SOLUTION RESPIRATORY (INHALATION) at 00:18

## 2019-03-28 RX ADMIN — GUAIFENESIN PRN ML: 200 SOLUTION ORAL at 05:35

## 2019-03-28 RX ADMIN — GUAIFENESIN PRN ML: 200 SOLUTION ORAL at 00:13

## 2019-03-28 RX ADMIN — TORSEMIDE SCH MG: 20 TABLET ORAL at 21:21

## 2019-03-28 RX ADMIN — INSULIN LISPRO SCH UNITS: 100 INJECTION, SOLUTION INTRAVENOUS; SUBCUTANEOUS at 12:23

## 2019-03-28 RX ADMIN — NYSTATIN SCH DOSE: 100000 POWDER TOPICAL at 08:54

## 2019-03-28 RX ADMIN — LEVOTHYROXINE SODIUM SCH MCG: 25 TABLET ORAL at 05:35

## 2019-03-28 NOTE — IPNPDOC
Text Note


Date of Service


The patient was seen on 3/28/19.





NOTE


Subjective:


Patient is an 83-year-old  female with a PMHx of DM2, A. fib, Complete 

HB s/p PM, Hx of DVT / PE, HTN, Pulmonary HTN, Chronic Morbid obesity, LARRY, Cor 

pulmonale, Chronic LE venous stasis ulcer who presented to the ER with 

complaints of atypical chest pain and cellulitis of both her lower extremities. 

She was admitted to the hospital service for further evaluation and treatment.





Patient was seen and examined at the bedside. Currently patient notes that she 

is not experiencing any chest pain, shortness of breath or palpitations. Patient

denies any nausea, vomiting, abdominal pain, constipation, diarrhea or 

discomfort with urination. She does note that her lower extremities appear to 

have slight decrease in swelling. She still notes that they're tender.





Objective:


Vitals (See below)


General: Lying in bed, no acute distress, comfortable, AAOx3


HEENT: NC, AT


CVS: RRR, +S1S2


Lungs: Fair air entry b/l, he does not appear to be any evidence of wheezing, 

rhonchi or rales


Abdomen: Abdomen is soft, nondistended, without any tenderness. She is morbidly 

obese


Extremities: In changes appeared to be chronic in nature, dressing is in place 

bilaterally





Assessment and plan:


s/p Atypical chest pain - unlikely 2/2 cardiac etiology, possibly 2/2 GI 

etiology 


- Patient has full resolution of chest pain


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


- EKG 3/24: Electronic ventricular pacemaker - similar to 4/29/18


- Troponin trend x 3 negative





Redness / Warmth / Tenderness - possibly 2/2 chronic venous stasis ulcer, less 

likely 2/2 cellulitis


- Clinically appears to be chronic in nature


- She remains afebrile and hemodynamically stable


- s/p leukocytosis


- Procalcitonin has been negative


- s/p Cefazolin 


- c/w Wound care as per recommendations from Dr. Stillerman 


- c/w PT & OT





LARRY


- non-compliant with CPAP





Obestiy


- complicating medical care


 


Hypothyroidism


- c/w Levothyroxine 


 


Atrial fibrillation


- Currently not on rate control medications (re: Hx of Complete heart block s/p 

PM)


- INR therapeutic 


- c/w full anticoagulation with Coumadin; Will DC Lovenox 





DVT / PE 


- INR therapeutic 


- c/w Coumadin; Will DC Lovenox 





Possible decompensated Diastolic CHF


- ECHO 3/25: Paradoxical septal motion with normal LV wall motion, findings to 

suggest RV pressure overload, unable to assess LV diastolic function, severe 

elevation of RVSP


- c/w Torsemide BID 





GERD


- c/w Omeprazole 





DVT prophylaxis


- c/w Warfarin; Will DC Lovenox 





Disposition:


- c/w Diuresis


- DC Abx





VS,Fishbone, I+O


VS, Fishbone, I+O


Laboratory Tests


3/28/19 05:57








Red Blood Count 3.98 L, Mean Corpuscular Volume 90.7, Mean Corpuscular 

Hemoglobin 29.1, Mean Corpuscular Hemoglobin Concent 32.1, Red Cell Distribution

Width 15.5 H, Neutrophils (%) (Auto) 66.7 H, Lymphocytes (%) (Auto) 21.2 L, 

Monocytes (%) (Auto) 8.2 H, Eosinophils (%) (Auto) 2.5, Basophils (%) (Auto) 

0.9, Neutrophils # (Auto) 5.1, Lymphocytes # (Auto) 1.6, Monocytes # (Auto) 0.6,

Eosinophils # (Auto) 0.2, Basophils # (Auto) 0.1, Calcium Level 8.0 L








Vital Signs








  Date Time  Temp Pulse Resp B/P (MAP) Pulse Ox O2 Delivery O2 Flow Rate FiO2


 


3/28/19 10:00 98.8 70 18 125/58 (80) 97   


 


3/24/19 20:30      Room Air  














I&O- Last 24 Hours up to 6 AM 


 


 3/28/19





 06:00


 


Intake Total 990 ml


 


Output Total 600 ml


 


Balance 390 ml

















MARLA FLORES MD                Mar 28, 2019 12:03

## 2019-03-29 VITALS — SYSTOLIC BLOOD PRESSURE: 159 MMHG | DIASTOLIC BLOOD PRESSURE: 70 MMHG

## 2019-03-29 VITALS — SYSTOLIC BLOOD PRESSURE: 117 MMHG | DIASTOLIC BLOOD PRESSURE: 57 MMHG

## 2019-03-29 VITALS — DIASTOLIC BLOOD PRESSURE: 72 MMHG | SYSTOLIC BLOOD PRESSURE: 147 MMHG

## 2019-03-29 VITALS — SYSTOLIC BLOOD PRESSURE: 144 MMHG | DIASTOLIC BLOOD PRESSURE: 66 MMHG

## 2019-03-29 VITALS — SYSTOLIC BLOOD PRESSURE: 160 MMHG | DIASTOLIC BLOOD PRESSURE: 72 MMHG

## 2019-03-29 VITALS — DIASTOLIC BLOOD PRESSURE: 68 MMHG | SYSTOLIC BLOOD PRESSURE: 136 MMHG

## 2019-03-29 LAB
BASOPHILS # BLD AUTO: 0.1 10^3/UL (ref 0–0.2)
BASOPHILS NFR BLD AUTO: 0.7 % (ref 0–1)
BUN SERPL-MCNC: 20 MG/DL (ref 7–18)
CALCIUM SERPL-MCNC: 7.8 MG/DL (ref 8.8–10.2)
CHLORIDE SERPL-SCNC: 104 MEQ/L (ref 98–107)
CO2 SERPL-SCNC: 30 MEQ/L (ref 21–32)
CREAT SERPL-MCNC: 1.1 MG/DL (ref 0.55–1.3)
EOSINOPHIL # BLD AUTO: 0.3 10^3/UL (ref 0–0.5)
EOSINOPHIL NFR BLD AUTO: 4 % (ref 0–3)
GFR SERPL CREATININE-BSD FRML MDRD: 50.5 ML/MIN/{1.73_M2} (ref 32–?)
GLUCOSE SERPL-MCNC: 134 MG/DL (ref 70–100)
HCT VFR BLD AUTO: 35 % (ref 36–47)
HGB BLD-MCNC: 11.2 G/DL (ref 12–15.5)
INR PPP: 2.25
LYMPHOCYTES # BLD AUTO: 1.4 10^3/UL (ref 1.5–4.5)
LYMPHOCYTES NFR BLD AUTO: 19.9 % (ref 24–44)
MAGNESIUM SERPL-MCNC: 1.9 MG/DL (ref 1.8–2.4)
MCH RBC QN AUTO: 29.2 PG (ref 27–33)
MCHC RBC AUTO-ENTMCNC: 32 G/DL (ref 32–36.5)
MCV RBC AUTO: 91.4 FL (ref 80–96)
MONOCYTES # BLD AUTO: 0.6 10^3/UL (ref 0–0.8)
MONOCYTES NFR BLD AUTO: 7.8 % (ref 0–5)
NEUTROPHILS # BLD AUTO: 4.7 10^3/UL (ref 1.8–7.7)
NEUTROPHILS NFR BLD AUTO: 67 % (ref 36–66)
PLATELET # BLD AUTO: 190 10^3/UL (ref 150–450)
POTASSIUM SERPL-SCNC: 3.8 MEQ/L (ref 3.5–5.1)
PROTHROMBIN TIME: 25.3 SECONDS (ref 12.1–14.4)
RBC # BLD AUTO: 3.83 10^6/UL (ref 4–5.4)
SODIUM SERPL-SCNC: 141 MEQ/L (ref 136–145)
WBC # BLD AUTO: 7.1 10^3/UL (ref 4–10)

## 2019-03-29 RX ADMIN — TORSEMIDE SCH MG: 20 TABLET ORAL at 20:45

## 2019-03-29 RX ADMIN — OMEPRAZOLE SCH MG: 20 CAPSULE, DELAYED RELEASE ORAL at 08:23

## 2019-03-29 RX ADMIN — ALBUTEROL SULFATE PRN MG: 2.5 SOLUTION RESPIRATORY (INHALATION) at 22:16

## 2019-03-29 RX ADMIN — INSULIN LISPRO SCH UNITS: 100 INJECTION, SOLUTION INTRAVENOUS; SUBCUTANEOUS at 17:06

## 2019-03-29 RX ADMIN — GUAIFENESIN PRN ML: 200 SOLUTION ORAL at 05:21

## 2019-03-29 RX ADMIN — POTASSIUM CHLORIDE SCH MEQ: 750 TABLET, EXTENDED RELEASE ORAL at 20:45

## 2019-03-29 RX ADMIN — ACETAMINOPHEN PRN MG: 325 TABLET ORAL at 23:32

## 2019-03-29 RX ADMIN — POTASSIUM CHLORIDE SCH MEQ: 750 TABLET, EXTENDED RELEASE ORAL at 08:23

## 2019-03-29 RX ADMIN — INSULIN LISPRO SCH UNITS: 100 INJECTION, SOLUTION INTRAVENOUS; SUBCUTANEOUS at 12:27

## 2019-03-29 RX ADMIN — ALBUTEROL SULFATE PRN MG: 2.5 SOLUTION RESPIRATORY (INHALATION) at 06:22

## 2019-03-29 RX ADMIN — LEVOTHYROXINE SODIUM SCH MCG: 25 TABLET ORAL at 05:21

## 2019-03-29 RX ADMIN — INSULIN LISPRO SCH UNITS: 100 INJECTION, SOLUTION INTRAVENOUS; SUBCUTANEOUS at 08:23

## 2019-03-29 RX ADMIN — ACETAMINOPHEN PRN MG: 325 TABLET ORAL at 12:31

## 2019-03-29 RX ADMIN — NYSTATIN SCH DOSE: 100000 POWDER TOPICAL at 08:23

## 2019-03-29 RX ADMIN — Medication SCH DOSE: at 08:24

## 2019-03-29 RX ADMIN — GUAIFENESIN PRN ML: 200 SOLUTION ORAL at 20:45

## 2019-03-29 RX ADMIN — GUAIFENESIN PRN ML: 200 SOLUTION ORAL at 00:10

## 2019-03-29 RX ADMIN — INSULIN LISPRO SCH UNITS: 100 INJECTION, SOLUTION INTRAVENOUS; SUBCUTANEOUS at 20:45

## 2019-03-29 RX ADMIN — ALBUTEROL SULFATE PRN MG: 2.5 SOLUTION RESPIRATORY (INHALATION) at 00:18

## 2019-03-29 RX ADMIN — TORSEMIDE SCH MG: 20 TABLET ORAL at 08:23

## 2019-03-29 RX ADMIN — NYSTATIN SCH DOSE: 100000 POWDER TOPICAL at 20:47

## 2019-03-29 RX ADMIN — WARFARIN SODIUM SCH MG: 5 TABLET ORAL at 17:07

## 2019-03-29 NOTE — IPNPDOC
Text Note


Date of Service


The patient was seen on 3/29/19.





NOTE


Subjective:


Patient is an 83-year-old  female with a PMHx of DM2, A. fib, Complete 

HB s/p PM, Hx of DVT / PE, HTN, Pulmonary HTN, Chronic Morbid obesity, LARRY, Cor 

pulmonale, Chronic LE venous stasis ulcer who presented to the ER with 

complaints of atypical chest pain and cellulitis of both her lower extremities. 

She was admitted to the hospital service for further evaluation and treatment.





Patient was seen and examined at the bedside. , Currently patient is without 

complaints. First, she denies chest pain, shortness of breath, palpitations, 

nausea, vomiting, abdominal pain. Patient does report some constipation. Denies 

any discomfort with urination. Still reports that she continues urinate 

significantly. 





Objective:


Vitals (See below)


General: Lying in bed, no acute distress, comfortable, AAOx3


HEENT: NC, AT


CVS: RRR, +S1S2


Lungs: Fair air entry b/l, 


Abdomen: Abdomen is obese but remains soft without distention or tenderness


Extremities: Wounds appear chronic in nature. No significant pitting edema, no 

calf tenderness





Assessment and plan:


s/p Atypical chest pain - unlikely 2/2 cardiac etiology, possibly 2/2 GI 

etiology 


- Patient has full resolution of chest pain


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


- EKG 3/24: Electronic ventricular pacemaker - similar to 4/29/18


- Troponin trend x 3 negative





Redness / Warmth / Tenderness - possibly 2/2 chronic venous stasis ulcer, less 

likely 2/2 cellulitis


- Clinically appears to be chronic in nature


- She remains afebrile and hemodynamically stable


- s/p leukocytosis


- Procalcitonin has been negative


- s/p Cefazolin 


- c/w Wound care as per recommendations from Dr. Stillerman 


- c/w PT & OT





LARRY


- non-compliant with CPAP





Obestiy


- complicating medical care


 


Hypothyroidism


- c/w Levothyroxine 


 


Atrial fibrillation


- Currently not on rate control medications (re: Hx of Complete heart block s/p 

PM)


- INR therapeutic 


- c/w full anticoagulation with Coumadin; DC Lovenox 





DVT / PE 


- INR therapeutic 


- c/w Coumadin; Will DC Lovenox 





Possible decompensated Diastolic CHF


- ECHO 3/25: Paradoxical septal motion with normal LV wall motion, findings to 

suggest RV pressure overload, unable to assess LV diastolic function, severe 

elevation of RVSP


- c/w Torsemide BID 





GERD


- c/w Omeprazole 





DVT prophylaxis


- c/w Warfarin; Will DC Lovenox 





Disposition:


- c/w PT & OT





VS,Fishbone, I+O


VS, Fishbone, I+O


Laboratory Tests


3/29/19 05:57








Red Blood Count 3.83 L, Mean Corpuscular Volume 91.4, Mean Corpuscular 

Hemoglobin 29.2, Mean Corpuscular Hemoglobin Concent 32.0, Red Cell Distribution

Width 15.6 H, Neutrophils (%) (Auto) 67.0 H, Lymphocytes (%) (Auto) 19.9 L, 

Monocytes (%) (Auto) 7.8 H, Eosinophils (%) (Auto) 4.0 H, Basophils (%) (Auto) 

0.7, Neutrophils # (Auto) 4.7, Lymphocytes # (Auto) 1.4 L, Monocytes # (Auto) 

0.6, Eosinophils # (Auto) 0.3, Basophils # (Auto) 0.1, Calcium Level 7.8 L








Vital Signs








  Date Time  Temp Pulse Resp B/P (MAP) Pulse Ox O2 Delivery O2 Flow Rate FiO2


 


3/29/19 10:00 97.8 70 20 147/72 (97) 98   


 


3/24/19 20:30      Room Air  














I&O- Last 24 Hours up to 6 AM 


 


 3/29/19





 06:00


 


Intake Total 1040 ml


 


Output Total 1000 ml


 


Balance 40 ml

















MARLA FLORES MD                Mar 29, 2019 14:05

## 2019-03-30 VITALS — SYSTOLIC BLOOD PRESSURE: 156 MMHG | DIASTOLIC BLOOD PRESSURE: 67 MMHG

## 2019-03-30 VITALS — DIASTOLIC BLOOD PRESSURE: 70 MMHG | SYSTOLIC BLOOD PRESSURE: 157 MMHG

## 2019-03-30 VITALS — SYSTOLIC BLOOD PRESSURE: 159 MMHG | DIASTOLIC BLOOD PRESSURE: 71 MMHG

## 2019-03-30 VITALS — SYSTOLIC BLOOD PRESSURE: 109 MMHG | DIASTOLIC BLOOD PRESSURE: 76 MMHG

## 2019-03-30 VITALS — DIASTOLIC BLOOD PRESSURE: 71 MMHG | SYSTOLIC BLOOD PRESSURE: 153 MMHG

## 2019-03-30 VITALS — DIASTOLIC BLOOD PRESSURE: 68 MMHG | SYSTOLIC BLOOD PRESSURE: 154 MMHG

## 2019-03-30 VITALS — SYSTOLIC BLOOD PRESSURE: 153 MMHG | DIASTOLIC BLOOD PRESSURE: 68 MMHG

## 2019-03-30 LAB
BASOPHILS # BLD AUTO: 0.1 10^3/UL (ref 0–0.2)
BASOPHILS NFR BLD AUTO: 1.2 % (ref 0–1)
BUN SERPL-MCNC: 22 MG/DL (ref 7–18)
CALCIUM SERPL-MCNC: 8.2 MG/DL (ref 8.8–10.2)
CHLORIDE SERPL-SCNC: 102 MEQ/L (ref 98–107)
CO2 SERPL-SCNC: 30 MEQ/L (ref 21–32)
CREAT SERPL-MCNC: 1.09 MG/DL (ref 0.55–1.3)
EOSINOPHIL # BLD AUTO: 0.3 10^3/UL (ref 0–0.5)
EOSINOPHIL NFR BLD AUTO: 4.9 % (ref 0–3)
GFR SERPL CREATININE-BSD FRML MDRD: 51 ML/MIN/{1.73_M2} (ref 32–?)
GLUCOSE SERPL-MCNC: 132 MG/DL (ref 70–100)
HCT VFR BLD AUTO: 38.2 % (ref 36–47)
HGB BLD-MCNC: 12.2 G/DL (ref 12–15.5)
INR PPP: 2.32
LYMPHOCYTES # BLD AUTO: 1.7 10^3/UL (ref 1.5–4.5)
LYMPHOCYTES NFR BLD AUTO: 24.6 % (ref 24–44)
MAGNESIUM SERPL-MCNC: 1.9 MG/DL (ref 1.8–2.4)
MCH RBC QN AUTO: 28.6 PG (ref 27–33)
MCHC RBC AUTO-ENTMCNC: 31.9 G/DL (ref 32–36.5)
MCV RBC AUTO: 89.7 FL (ref 80–96)
MONOCYTES # BLD AUTO: 0.5 10^3/UL (ref 0–0.8)
MONOCYTES NFR BLD AUTO: 7.2 % (ref 0–5)
NEUTROPHILS # BLD AUTO: 4.2 10^3/UL (ref 1.8–7.7)
NEUTROPHILS NFR BLD AUTO: 61.8 % (ref 36–66)
PLATELET # BLD AUTO: 220 10^3/UL (ref 150–450)
POTASSIUM SERPL-SCNC: 4 MEQ/L (ref 3.5–5.1)
PROTHROMBIN TIME: 25.9 SECONDS (ref 12.1–14.4)
RBC # BLD AUTO: 4.26 10^6/UL (ref 4–5.4)
SODIUM SERPL-SCNC: 140 MEQ/L (ref 136–145)
WBC # BLD AUTO: 6.8 10^3/UL (ref 4–10)

## 2019-03-30 RX ADMIN — POTASSIUM CHLORIDE SCH MEQ: 750 TABLET, EXTENDED RELEASE ORAL at 08:01

## 2019-03-30 RX ADMIN — WARFARIN SODIUM SCH MG: 5 TABLET ORAL at 17:36

## 2019-03-30 RX ADMIN — INSULIN LISPRO SCH UNITS: 100 INJECTION, SOLUTION INTRAVENOUS; SUBCUTANEOUS at 12:01

## 2019-03-30 RX ADMIN — GUAIFENESIN PRN ML: 200 SOLUTION ORAL at 12:00

## 2019-03-30 RX ADMIN — ACETAMINOPHEN PRN MG: 325 TABLET ORAL at 12:01

## 2019-03-30 RX ADMIN — INSULIN LISPRO SCH UNITS: 100 INJECTION, SOLUTION INTRAVENOUS; SUBCUTANEOUS at 17:36

## 2019-03-30 RX ADMIN — POTASSIUM CHLORIDE SCH MEQ: 750 TABLET, EXTENDED RELEASE ORAL at 21:14

## 2019-03-30 RX ADMIN — ALBUTEROL SULFATE PRN MG: 2.5 SOLUTION RESPIRATORY (INHALATION) at 07:55

## 2019-03-30 RX ADMIN — Medication SCH DOSE: at 08:00

## 2019-03-30 RX ADMIN — TORSEMIDE SCH MG: 20 TABLET ORAL at 08:01

## 2019-03-30 RX ADMIN — INSULIN LISPRO SCH UNITS: 100 INJECTION, SOLUTION INTRAVENOUS; SUBCUTANEOUS at 21:00

## 2019-03-30 RX ADMIN — OMEPRAZOLE SCH MG: 20 CAPSULE, DELAYED RELEASE ORAL at 08:01

## 2019-03-30 RX ADMIN — NYSTATIN SCH DOSE: 100000 POWDER TOPICAL at 21:00

## 2019-03-30 RX ADMIN — LEVOTHYROXINE SODIUM SCH MCG: 25 TABLET ORAL at 05:41

## 2019-03-30 RX ADMIN — NYSTATIN SCH DOSE: 100000 POWDER TOPICAL at 08:00

## 2019-03-30 RX ADMIN — DOCUSATE SODIUM,SENNOSIDES PRN TAB: 50; 8.6 TABLET, FILM COATED ORAL at 08:00

## 2019-03-30 RX ADMIN — GUAIFENESIN PRN ML: 200 SOLUTION ORAL at 05:41

## 2019-03-30 RX ADMIN — GUAIFENESIN PRN ML: 200 SOLUTION ORAL at 23:56

## 2019-03-30 RX ADMIN — INSULIN LISPRO SCH UNITS: 100 INJECTION, SOLUTION INTRAVENOUS; SUBCUTANEOUS at 07:59

## 2019-03-30 RX ADMIN — TORSEMIDE SCH MG: 20 TABLET ORAL at 20:30

## 2019-03-30 NOTE — IPNPDOC
Text Note


Date of Service


The patient was seen on 3/30/19.





NOTE


Subjective:


Patient is an 83-year-old  female with a PMHx of DM2, A. fib, Complete 

HB s/p PM, Hx of DVT / PE, HTN, Pulmonary HTN, Chronic Morbid obesity, LARRY, Cor 

pulmonale, Chronic LE venous stasis ulcer who presented to the ER with 

complaints of atypical chest pain and cellulitis of both her lower extremities. 

She was admitted to the hospital service for further evaluation and treatment.





Patient was seen and examined at the bedside. , Currently patient has no new 

complaints. She denies any difficulty breathing, cough, chest pain or 

palpitations. Denies nausea, vomiting, abdominal pain, constipation. Patient has

noted a bowel movement yesterday. Notes that she continues to urinate but does 

not experience any discomfort.





Objective:


Vitals (See below)


General: Lying in bed, no acute distress, comfortable, AAOx3


HEENT: NC, AT


CVS: RRR, +S1S2


Lungs: Fair air entry b/l, there are no appreciable crackles, wheezing or 

rhonchi


Abdomen: Does not appear to be any abdominal distention or tenderness. Abdomen 

remains soft


Extremities: Dressing has been changed, no significant pitting edema





Assessment and plan:


s/p Atypical chest pain - unlikely 2/2 cardiac etiology, possibly 2/2 GI 

etiology 


- Patient has full resolution of chest pain


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


- EKG 3/24: Electronic ventricular pacemaker - similar to 4/29/18


- Troponin trend x 3 negative





Redness / Warmth / Tenderness - possibly 2/2 chronic venous stasis ulcer, less 

likely 2/2 cellulitis


- Clinically appears to be chronic in nature


- She remains afebrile and hemodynamically stable


- s/p leukocytosis


- Procalcitonin has been negative


- s/p Cefazolin 


- c/w Wound care as per recommendations from Dr. Stillerman 


- c/w PT & OT





LARRY


- Non-compliant with CPAP





Obestiy


- complicating medical care


 


Hypothyroidism


- c/w Levothyroxine 


 


Atrial fibrillation


- Currently not on rate control medications (re: Hx of Complete heart block s/p 

PM)


- INR therapeutic 


- c/w full anticoagulation with Coumadin; DC Lovenox 





DVT / PE 


- INR therapeutic 


- c/w Coumadin; Will DC Lovenox 





Possible decompensated Diastolic CHF


- ECHO 3/25: Paradoxical septal motion with normal LV wall motion, findings to 

suggest RV pressure overload, unable to assess LV diastolic function, severe 

elevation of RVSP


- c/w Torsemide BID 





GERD


- c/w Omeprazole 





DVT prophylaxis


- c/w full anticoagulation with Warfarin





Disposition:


- c/w PT & OT





VS,Fishbone, I+O


VS, Fishbone, I+O


Laboratory Tests


3/30/19 05:36








Red Blood Count 4.26, Mean Corpuscular Volume 89.7, Mean Corpuscular Hemoglobin 

28.6, Mean Corpuscular Hemoglobin Concent 31.9 L, Red Cell Distribution Width 

15.5 H, Neutrophils (%) (Auto) 61.8, Lymphocytes (%) (Auto) 24.6, Monocytes (%) 

(Auto) 7.2 H, Eosinophils (%) (Auto) 4.9 H, Basophils (%) (Auto) 1.2 H, 

Neutrophils # (Auto) 4.2, Lymphocytes # (Auto) 1.7, Monocytes # (Auto) 0.5, 

Eosinophils # (Auto) 0.3, Basophils # (Auto) 0.1, Calcium Level 8.2 L








Vital Signs








  Date Time  Temp Pulse Resp B/P (MAP) Pulse Ox O2 Delivery O2 Flow Rate FiO2


 


3/30/19 10:00 97.3 70 19 156/67 (96) 100   


 


3/24/19 20:30      Room Air  














I&O- Last 24 Hours up to 6 AM 


 


 3/30/19





 06:00


 


Intake Total 920 ml


 


Output Total 1775 ml


 


Balance -855 ml

















MARLA FLORES MD                Mar 30, 2019 12:32

## 2019-03-31 VITALS — DIASTOLIC BLOOD PRESSURE: 70 MMHG | SYSTOLIC BLOOD PRESSURE: 148 MMHG

## 2019-03-31 VITALS — DIASTOLIC BLOOD PRESSURE: 72 MMHG | SYSTOLIC BLOOD PRESSURE: 113 MMHG

## 2019-03-31 VITALS — SYSTOLIC BLOOD PRESSURE: 115 MMHG | DIASTOLIC BLOOD PRESSURE: 74 MMHG

## 2019-03-31 LAB
BASOPHILS # BLD AUTO: 0.1 10^3/UL (ref 0–0.2)
BASOPHILS NFR BLD AUTO: 1.1 % (ref 0–1)
BUN SERPL-MCNC: 24 MG/DL (ref 7–18)
CALCIUM SERPL-MCNC: 8.1 MG/DL (ref 8.8–10.2)
CHLORIDE SERPL-SCNC: 103 MEQ/L (ref 98–107)
CO2 SERPL-SCNC: 31 MEQ/L (ref 21–32)
CREAT SERPL-MCNC: 1.05 MG/DL (ref 0.55–1.3)
EOSINOPHIL # BLD AUTO: 0.4 10^3/UL (ref 0–0.5)
EOSINOPHIL NFR BLD AUTO: 5.5 % (ref 0–3)
GFR SERPL CREATININE-BSD FRML MDRD: 53.3 ML/MIN/{1.73_M2} (ref 32–?)
GLUCOSE SERPL-MCNC: 131 MG/DL (ref 70–100)
HCT VFR BLD AUTO: 36.2 % (ref 36–47)
HGB BLD-MCNC: 11.3 G/DL (ref 12–15.5)
INR PPP: 2.49
LYMPHOCYTES # BLD AUTO: 1.3 10^3/UL (ref 1.5–4.5)
LYMPHOCYTES NFR BLD AUTO: 19.1 % (ref 24–44)
MAGNESIUM SERPL-MCNC: 1.8 MG/DL (ref 1.8–2.4)
MCH RBC QN AUTO: 28.8 PG (ref 27–33)
MCHC RBC AUTO-ENTMCNC: 31.2 G/DL (ref 32–36.5)
MCV RBC AUTO: 92.1 FL (ref 80–96)
MONOCYTES # BLD AUTO: 0.5 10^3/UL (ref 0–0.8)
MONOCYTES NFR BLD AUTO: 7.9 % (ref 0–5)
NEUTROPHILS # BLD AUTO: 4.3 10^3/UL (ref 1.8–7.7)
NEUTROPHILS NFR BLD AUTO: 65.9 % (ref 36–66)
PLATELET # BLD AUTO: 191 10^3/UL (ref 150–450)
POTASSIUM SERPL-SCNC: 4.1 MEQ/L (ref 3.5–5.1)
PROTHROMBIN TIME: 27.4 SECONDS (ref 12.1–14.4)
RBC # BLD AUTO: 3.93 10^6/UL (ref 4–5.4)
SODIUM SERPL-SCNC: 140 MEQ/L (ref 136–145)
WBC # BLD AUTO: 6.6 10^3/UL (ref 4–10)

## 2019-03-31 RX ADMIN — NYSTATIN SCH DOSE: 100000 POWDER TOPICAL at 08:11

## 2019-03-31 RX ADMIN — INSULIN LISPRO SCH UNITS: 100 INJECTION, SOLUTION INTRAVENOUS; SUBCUTANEOUS at 17:15

## 2019-03-31 RX ADMIN — LEVOTHYROXINE SODIUM SCH MCG: 25 TABLET ORAL at 05:09

## 2019-03-31 RX ADMIN — TORSEMIDE SCH MG: 20 TABLET ORAL at 08:11

## 2019-03-31 RX ADMIN — OMEPRAZOLE SCH MG: 20 CAPSULE, DELAYED RELEASE ORAL at 08:11

## 2019-03-31 RX ADMIN — NYSTATIN SCH DOSE: 100000 POWDER TOPICAL at 21:00

## 2019-03-31 RX ADMIN — GUAIFENESIN PRN ML: 200 SOLUTION ORAL at 17:19

## 2019-03-31 RX ADMIN — DOCUSATE SODIUM,SENNOSIDES PRN TAB: 50; 8.6 TABLET, FILM COATED ORAL at 09:46

## 2019-03-31 RX ADMIN — ALBUTEROL SULFATE PRN MG: 2.5 SOLUTION RESPIRATORY (INHALATION) at 09:48

## 2019-03-31 RX ADMIN — INSULIN LISPRO SCH UNITS: 100 INJECTION, SOLUTION INTRAVENOUS; SUBCUTANEOUS at 21:00

## 2019-03-31 RX ADMIN — ACETAMINOPHEN PRN MG: 325 TABLET ORAL at 05:10

## 2019-03-31 RX ADMIN — INSULIN LISPRO SCH UNITS: 100 INJECTION, SOLUTION INTRAVENOUS; SUBCUTANEOUS at 11:56

## 2019-03-31 RX ADMIN — ALBUTEROL SULFATE PRN MG: 2.5 SOLUTION RESPIRATORY (INHALATION) at 17:41

## 2019-03-31 RX ADMIN — INSULIN LISPRO SCH UNITS: 100 INJECTION, SOLUTION INTRAVENOUS; SUBCUTANEOUS at 08:11

## 2019-03-31 RX ADMIN — ACETAMINOPHEN PRN MG: 325 TABLET ORAL at 09:47

## 2019-03-31 RX ADMIN — WARFARIN SODIUM SCH MG: 5 TABLET ORAL at 17:16

## 2019-03-31 RX ADMIN — ALBUTEROL SULFATE PRN MG: 2.5 SOLUTION RESPIRATORY (INHALATION) at 00:02

## 2019-03-31 RX ADMIN — POTASSIUM CHLORIDE SCH MEQ: 750 TABLET, EXTENDED RELEASE ORAL at 21:00

## 2019-03-31 RX ADMIN — Medication SCH DOSE: at 08:12

## 2019-03-31 RX ADMIN — POTASSIUM CHLORIDE SCH MEQ: 750 TABLET, EXTENDED RELEASE ORAL at 08:10

## 2019-03-31 RX ADMIN — GUAIFENESIN PRN ML: 200 SOLUTION ORAL at 05:20

## 2019-03-31 NOTE — IPNPDOC
Text Note


Date of Service


The patient was seen on 3/31/19.





NOTE


Subjective:


Patient is an 83-year-old  female with a PMHx of DM2, A. fib, Complete 

HB s/p PM, Hx of DVT / PE, HTN, Pulmonary HTN, Chronic Morbid obesity, LARRY, Cor 

pulmonale, Chronic LE venous stasis ulcer who presented to the ER with 

complaints of atypical chest pain and cellulitis of both her lower extremities. 

She was admitted to the hospital service for further evaluation and treatment.





Patient was seen and examined at the bedside. Currently patient notes that she 

feels fatigued. Currently patient denies chest pain, palpitations or any 

significant shortness of breath. Does note a mild cough. Denies any nausea, 

vomiting, abdominal pain, constipation, diarrhea. A she continues to urinate. 

However, denies any discomfort with urination.





Patient has had no events overnight





Objective:


Vitals (See below)


General: Lying in bed, no acute distress, comfortable, AAOx3


HEENT: NC, AT


CVS: RRR, +S1S2


Lungs: Fair air entry b/l, auscultation is without wheezing, rales or rhonchi


Abdomen: Abdomen is soft without distention or tenderness


Extremities: Dressing has been partially removed; chronic venous stasis ulcers 

appear unchanged, no significant pitting edema





Assessment and plan:


Redness / Warmth / Tenderness - possibly 2/2 chronic venous stasis ulcer, less 

likely 2/2 cellulitis


- Clinically appears to be chronic in nature; was this morning do not appear to 

show any significant change


- She remains afebrile and hemodynamically stable


- s/p leukocytosis


- Procalcitonin has been negative; s/p Cefazolin 


- c/w Wound care as per recommendations from Dr. Stillerman; dressing changes 

daily


- c/w PT & OT





Compensated Diastolic CHF


- Appears to be euvolemic today 


- ECHO 3/25: Paradoxical septal motion with normal LV wall motion, findings to 

suggest RV pressure overload, unable to assess LV diastolic function, severe 

elevation of RVSP


- c/w Torsemide; will reduce frequency to daily dosing





s/p Atypical chest pain - unlikely 2/2 cardiac etiology, possibly 2/2 GI 

etiology 


- Patient has full resolution of chest pain


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


- EKG 3/24: Electronic ventricular pacemaker - similar to 4/29/18


- Troponin trend x 3 negative





LARRY


- Non-compliant with CPAP





Obestiy


- complicating medical care


 


Hypothyroidism


- c/w Levothyroxine 


 


Atrial fibrillation


- Currently not on rate control medications (re: Hx of Complete heart block s/p 

PM)


- INR therapeutic 


- c/w full anticoagulation with Coumadin





DVT / PE 


- INR therapeutic 


- c/w Coumadin; Will DC Lovenox 





GERD


- c/w Omeprazole 





DVT prophylaxis


- c/w full anticoagulation with Warfarin





Disposition:


- c/w PT & OT


- Possibly may require subacute rehabilitation





VS,Montserrat, I+O


VS, [place], I+O


Laboratory Tests


3/31/19 06:10








Red Blood Count 3.93 L, Mean Corpuscular Volume 92.1, Mean Corpuscular Hemog

lobin 28.8, Mean Corpuscular Hemoglobin Concent 31.2 L, Red Cell Distribution 

Width 15.3 H, Neutrophils (%) (Auto) 65.9, Lymphocytes (%) (Auto) 19.1 L, 

Monocytes (%) (Auto) 7.9 H, Eosinophils (%) (Auto) 5.5 H, Basophils (%) (Auto) 

1.1 H, Neutrophils # (Auto) 4.3, Lymphocytes # (Auto) 1.3 L, Monocytes # (Auto) 

0.5, Eosinophils # (Auto) 0.4, Basophils # (Auto) 0.1, Calcium Level 8.1 L








Vital Signs








  Date Time  Temp Pulse Resp B/P (MAP) Pulse Ox O2 Delivery O2 Flow Rate FiO2


 


3/31/19 06:00 97.4 70 18 115/74 (88) 99   














I&O- Last 24 Hours up to 6 AM 


 


 3/31/19





 06:00


 


Intake Total 1900 ml


 


Output Total 700 ml


 


Balance 1200 ml

















MARLA FLORES MD                Mar 31, 2019 09:44

## 2019-04-01 VITALS — DIASTOLIC BLOOD PRESSURE: 74 MMHG | SYSTOLIC BLOOD PRESSURE: 153 MMHG

## 2019-04-01 VITALS — DIASTOLIC BLOOD PRESSURE: 62 MMHG | SYSTOLIC BLOOD PRESSURE: 131 MMHG

## 2019-04-01 VITALS — DIASTOLIC BLOOD PRESSURE: 78 MMHG | SYSTOLIC BLOOD PRESSURE: 171 MMHG

## 2019-04-01 LAB
BASOPHILS # BLD AUTO: 0.1 10^3/UL (ref 0–0.2)
BASOPHILS NFR BLD AUTO: 1.5 % (ref 0–1)
BUN SERPL-MCNC: 26 MG/DL (ref 7–18)
CALCIUM SERPL-MCNC: 8 MG/DL (ref 8.8–10.2)
CHLORIDE SERPL-SCNC: 102 MEQ/L (ref 98–107)
CO2 SERPL-SCNC: 31 MEQ/L (ref 21–32)
CREAT SERPL-MCNC: 1.33 MG/DL (ref 0.55–1.3)
EOSINOPHIL # BLD AUTO: 0.2 10^3/UL (ref 0–0.5)
EOSINOPHIL NFR BLD AUTO: 3.9 % (ref 0–3)
GFR SERPL CREATININE-BSD FRML MDRD: 40.6 ML/MIN/{1.73_M2} (ref 32–?)
GLUCOSE SERPL-MCNC: 141 MG/DL (ref 70–100)
HCT VFR BLD AUTO: 34.8 % (ref 36–47)
HGB BLD-MCNC: 11.1 G/DL (ref 12–15.5)
INR PPP: 2.55
LYMPHOCYTES # BLD AUTO: 1.4 10^3/UL (ref 1.5–4.5)
LYMPHOCYTES NFR BLD AUTO: 23.7 % (ref 24–44)
MAGNESIUM SERPL-MCNC: 1.9 MG/DL (ref 1.8–2.4)
MCH RBC QN AUTO: 29.2 PG (ref 27–33)
MCHC RBC AUTO-ENTMCNC: 31.9 G/DL (ref 32–36.5)
MCV RBC AUTO: 91.6 FL (ref 80–96)
MONOCYTES # BLD AUTO: 0.5 10^3/UL (ref 0–0.8)
MONOCYTES NFR BLD AUTO: 8.6 % (ref 0–5)
NEUTROPHILS # BLD AUTO: 3.7 10^3/UL (ref 1.8–7.7)
NEUTROPHILS NFR BLD AUTO: 62 % (ref 36–66)
PLATELET # BLD AUTO: 192 10^3/UL (ref 150–450)
POTASSIUM SERPL-SCNC: 4.3 MEQ/L (ref 3.5–5.1)
PROTHROMBIN TIME: 28 SECONDS (ref 12.1–14.4)
RBC # BLD AUTO: 3.8 10^6/UL (ref 4–5.4)
SODIUM SERPL-SCNC: 139 MEQ/L (ref 136–145)
WBC # BLD AUTO: 5.9 10^3/UL (ref 4–10)

## 2019-04-01 RX ADMIN — INSULIN LISPRO SCH UNITS: 100 INJECTION, SOLUTION INTRAVENOUS; SUBCUTANEOUS at 17:49

## 2019-04-01 RX ADMIN — POTASSIUM CHLORIDE SCH MEQ: 750 TABLET, EXTENDED RELEASE ORAL at 08:23

## 2019-04-01 RX ADMIN — TORSEMIDE SCH MG: 20 TABLET ORAL at 08:23

## 2019-04-01 RX ADMIN — OMEPRAZOLE SCH MG: 20 CAPSULE, DELAYED RELEASE ORAL at 08:23

## 2019-04-01 RX ADMIN — NYSTATIN SCH DOSE: 100000 POWDER TOPICAL at 22:32

## 2019-04-01 RX ADMIN — POTASSIUM CHLORIDE SCH MEQ: 750 TABLET, EXTENDED RELEASE ORAL at 22:32

## 2019-04-01 RX ADMIN — ACETAMINOPHEN PRN MG: 325 TABLET ORAL at 11:20

## 2019-04-01 RX ADMIN — NYSTATIN SCH DOSE: 100000 POWDER TOPICAL at 08:22

## 2019-04-01 RX ADMIN — LEVOTHYROXINE SODIUM SCH MCG: 25 TABLET ORAL at 06:15

## 2019-04-01 RX ADMIN — INSULIN LISPRO SCH UNITS: 100 INJECTION, SOLUTION INTRAVENOUS; SUBCUTANEOUS at 21:00

## 2019-04-01 RX ADMIN — INSULIN LISPRO SCH UNITS: 100 INJECTION, SOLUTION INTRAVENOUS; SUBCUTANEOUS at 12:32

## 2019-04-01 RX ADMIN — WARFARIN SODIUM SCH MG: 5 TABLET ORAL at 17:49

## 2019-04-01 RX ADMIN — ACETAMINOPHEN PRN MG: 325 TABLET ORAL at 06:15

## 2019-04-01 RX ADMIN — Medication SCH DOSE: at 08:23

## 2019-04-01 RX ADMIN — INSULIN LISPRO SCH UNITS: 100 INJECTION, SOLUTION INTRAVENOUS; SUBCUTANEOUS at 08:24

## 2019-04-01 NOTE — DS.PDOC
Discharge Summary


General


Date of Admission


Mar 24, 2019 at 16:43


Date of Discharge


4/1/2019





Discharge Summary


PROCEDURES PERFORMED DURING STAY: 


[None].





ADMITTING DIAGNOSES / DISCHARGE DIAGNOSES:


Redness / Warmth / Tenderness - possibly 2/2 chronic venous stasis ulcer, less 

likely 2/2 cellulitis


Compensated Diastolic CHF


s/p Atypical chest pain - unlikely 2/2 cardiac etiology, possibly 2/2 GI 

etiology 


LARRY


Obestiy


Hypothyroidism


Atrial fibrillation


DVT / PE 


GERD


DVT prophylaxis





COMPLICATIONS/CHIEF COMPLAINT: 


Chest pain and shortness of breath 





HISTORY OF PRESENT ILLNESS:


Patient is an 83-year-old  female with a PMHx of DM2, A. fib, Complete 

HB s/p PM, Hx of DVT / PE, HTN, Pulmonary HTN, Chronic Morbid obesity, LARRY, Cor 

pulmonale, Chronic LE venous stasis ulcer who presented to the ER with 

complaints of atypical chest pain and cellulitis of both her lower extremities. 

She was admitted to the hospital service for further evaluation and treatment.





HOSPITAL COURSE: 


Redness / Warmth / Tenderness - possibly 2/2 chronic venous stasis ulcer, less 

likely 2/2 cellulitis


- Clinically appears to be chronic in nature; was this morning do not appear to 

show any significant change


- She remains afebrile and hemodynamically stable


- s/p leukocytosis


- Procalcitonin has been negative; s/p Cefazolin 


- c/w Wound care as per recommendations from Dr. Stillerman; dressing changes 

daily


- Continue with outpatient follow-up with Dr. Stillerman


- c/w PT & OT





Compensated Diastolic CHF


- Does not reveal any significant signs of fluid overload


- ECHO 3/25: Paradoxical septal motion with normal LV wall motion, findings to 

suggest RV pressure overload, unable to assess LV diastolic function, severe 

elevation of RVSP


- c/w Torsemide - as an outpatient, will continue with twice a day dosing





s/p Atypical chest pain - unlikely 2/2 cardiac etiology, possibly 2/2 GI 

etiology 


- Patient has full resolution of chest pain


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


- EKG 3/24: Electronic ventricular pacemaker - similar to 4/29/18


- Troponin trend x 3 negative





LARRY


- Non-compliant with CPAP





Obestiy


- complicating medical care


 


Hypothyroidism


- c/w Levothyroxine 


 


Atrial fibrillation


- Currently not on rate control medications (re: Hx of Complete heart block s/p 

PM)


- INR therapeutic 


- c/w full anticoagulation with Coumadin





DVT / PE 


- INR therapeutic 


- c/w Coumadin





GERD


- c/w Omeprazole 





DVT prophylaxis


- c/w full anticoagulation with Warfarin





DISCHARGE MEDICATIONS: 


Please see below.


 


ALLERGIES: 


Please see below.





PHYSICAL EXAMINATION ON DISCHARGE:


Vitals (See below)


General: Lying in bed, no acute distress, comfortable, AAOx3


HEENT: NC, AT


CVS: RRR, +S1S2


Lungs: Fair air entry b/l, there does not appear to be any appreciable wheezing,

 rales or rhonchi


Abdomen: Abdomen is soft, remains nontender and nondistended


Extremities: Chronic venous stasis ulcers appear unchanged - dressing will be 

changed today, no significant pitting edema





LABORATORY DATA: 


Please see below.





ACTIVITY: 


[As tolerated].





DIET:


Fluid restriction fo 1700 cc





DISCHARGE PLAN: 


Follow-up with Dr. LATONIA Lin within 7 days


Remain compliant with treatment plan and medications


Return to the ER if you experience any problems





DISPOSITION:


[facility] [place]





DISCHARGE CONDITION: 


[Stable].





TIME SPENT ON DISCHARGE: 


Greater than [25] minutes.





Vital Signs/I&Os





Vital Signs








  Date Time  Temp Pulse Resp B/P (MAP) Pulse Ox O2 Delivery O2 Flow Rate FiO2


 


4/1/19 06:00 97.3 70 19 153/74 (100) 94   














I&O- Last 24 Hours up to 6 AM 


 


 4/1/19





 06:00


 


Intake Total 600 ml


 


Output Total 1401 ml


 


Balance -801 ml











Laboratory Data


Labs 24H


Laboratory Tests 2


3/31/19 16:42: Bedside Glucose (Misc Panel) 181H


3/31/19 20:21: Bedside Glucose (Misc Panel) 117H


4/1/19 05:45: 


Immature Granulocyte % (Auto) 0.3, White Blood Count 5.9, Red Blood Count 3.80L,

 Hemoglobin 11.1L, Hematocrit 34.8L, Mean Corpuscular Volume 91.6, Mean 

Corpuscular Hemoglobin 29.2, Mean Corpuscular Hemoglobin Concent 31.9L, Red Cell

 Distribution Width 15.7H, Platelet Count 192, Neutrophils (%) (Auto) 62.0, L

ymphocytes (%) (Auto) 23.7L, Monocytes (%) (Auto) 8.6H, Eosinophils (%) (Auto) 

3.9H, Basophils (%) (Auto) 1.5H, Neutrophils # (Auto) 3.7, Lymphocytes # (Auto) 

1.4L, Monocytes # (Auto) 0.5, Eosinophils # (Auto) 0.2, Basophils # (Auto) 0.1, 

Nucleated Red Blood Cells % (auto) 0.0, Prothrombin Time 28.0H, Prothromb Time 

International Ratio 2.55, Anion Gap 6L, Glomerular Filtration Rate 40.6, Blood 

Urea Nitrogen 26H, Creatinine 1.33H, Sodium Level 139, Potassium Level 4.3, 

Chloride Level 102, Carbon Dioxide Level 31, Calcium Level 8.0L, Magnesium Level

 1.9


4/1/19 11:25: Bedside Glucose (Misc Panel) 160H


CBC/BMP


Laboratory Tests


4/1/19 05:45








Red Blood Count 3.80 L, Mean Corpuscular Volume 91.6, Mean Corpuscular 

Hemoglobin 29.2, Mean Corpuscular Hemoglobin Concent 31.9 L, Red Cell 

Distribution Width 15.7 H, Neutrophils (%) (Auto) 62.0, Lymphocytes (%) (Auto) 

23.7 L, Monocytes (%) (Auto) 8.6 H, Eosinophils (%) (Auto) 3.9 H, Basophils (%) 

(Auto) 1.5 H, Neutrophils # (Auto) 3.7, Lymphocytes # (Auto) 1.4 L, Monocytes # 

(Auto) 0.5, Eosinophils # (Auto) 0.2, Basophils # (Auto) 0.1, Calcium Level 8.0 

L


FSBS





Laboratory Tests








Test


 3/31/19


16:42 3/31/19


20:21 4/1/19


11:25 Range/Units


 


 


Bedside Glucose (Misc Panel) 181 117 160   MG/DL











Discharge Medications


Scheduled


Levothyroxine Sodium (Synthroid) 25 Mcg Tab, 25 MCG PO DAILY, (Reported)


Omeprazole (Omeprazole) 20 Mg Cap, 20 MG PO DAILY, (Reported)


Potassium Chloride (Potassium Chloride ER) 10 Meq Tab, 20 MEQ PO BID, (Reported)


Torsemide (Torsemide) 20 Mg Tab, 20 MG PO BID


   to be given at 0700 hours and 1400 hours 


Warfarin Sod (Warfarin Sodium) 5 Mg Tab, 5 MG PO DAILY, (Reported)





Scheduled PRN


 (Senna Plus 8.6-50 mg) 1 Tab Tab, 1 TAB PO BID PRN for CONSTIPATION, (Reported)


Acetaminophen (Acetaminophen) 500 Mg Tab, 1,000 MG PO Q6H PRN for PAIN, 

(Reported)


Albuterol Sulfate (Albuterol Sulfate) 2.5 Mg/3 Ml Nebu, 2.5 MG INH Q4H PRN for 

SHORTNESS OF BREATH, (Reported)





Allergies


Coded Allergies:  


     dirithromycin (Verified  Allergy, Intermediate, hives, 3/27/19)


     Sulfa (Sulfonamide Antibiotics) (Verified  Allergy, Unknown, 3/27/19)


     Penicillins (Verified  Adverse Reaction, Intermediate, tachycardia 40 yrs 

ago, 3/27/19)











MARLA FLORES MD                 Apr 1, 2019 12:22

## 2019-04-02 VITALS — DIASTOLIC BLOOD PRESSURE: 69 MMHG | SYSTOLIC BLOOD PRESSURE: 154 MMHG

## 2019-04-02 VITALS — SYSTOLIC BLOOD PRESSURE: 138 MMHG | DIASTOLIC BLOOD PRESSURE: 64 MMHG

## 2019-04-02 LAB
BASOPHILS # BLD AUTO: 0.1 10^3/UL (ref 0–0.2)
BASOPHILS NFR BLD AUTO: 1 % (ref 0–1)
BUN SERPL-MCNC: 26 MG/DL (ref 7–18)
CALCIUM SERPL-MCNC: 8.3 MG/DL (ref 8.8–10.2)
CHLORIDE SERPL-SCNC: 104 MEQ/L (ref 98–107)
CO2 SERPL-SCNC: 30 MEQ/L (ref 21–32)
CREAT SERPL-MCNC: 1.13 MG/DL (ref 0.55–1.3)
EOSINOPHIL # BLD AUTO: 0.2 10^3/UL (ref 0–0.5)
EOSINOPHIL NFR BLD AUTO: 4.2 % (ref 0–3)
GFR SERPL CREATININE-BSD FRML MDRD: 49 ML/MIN/{1.73_M2} (ref 32–?)
GLUCOSE SERPL-MCNC: 128 MG/DL (ref 70–100)
HCT VFR BLD AUTO: 37.3 % (ref 36–47)
HGB BLD-MCNC: 11.6 G/DL (ref 12–15.5)
LYMPHOCYTES # BLD AUTO: 1 10^3/UL (ref 1.5–4.5)
LYMPHOCYTES NFR BLD AUTO: 17.7 % (ref 24–44)
MAGNESIUM SERPL-MCNC: 1.9 MG/DL (ref 1.8–2.4)
MCH RBC QN AUTO: 28.6 PG (ref 27–33)
MCHC RBC AUTO-ENTMCNC: 31.1 G/DL (ref 32–36.5)
MCV RBC AUTO: 91.9 FL (ref 80–96)
MONOCYTES # BLD AUTO: 0.5 10^3/UL (ref 0–0.8)
MONOCYTES NFR BLD AUTO: 8.5 % (ref 0–5)
NEUTROPHILS # BLD AUTO: 3.9 10^3/UL (ref 1.8–7.7)
NEUTROPHILS NFR BLD AUTO: 68.1 % (ref 36–66)
PLATELET # BLD AUTO: 187 10^3/UL (ref 150–450)
POTASSIUM SERPL-SCNC: 4.4 MEQ/L (ref 3.5–5.1)
RBC # BLD AUTO: 4.06 10^6/UL (ref 4–5.4)
SODIUM SERPL-SCNC: 139 MEQ/L (ref 136–145)
WBC # BLD AUTO: 5.8 10^3/UL (ref 4–10)

## 2019-04-02 RX ADMIN — INSULIN LISPRO SCH UNITS: 100 INJECTION, SOLUTION INTRAVENOUS; SUBCUTANEOUS at 08:48

## 2019-04-02 RX ADMIN — TORSEMIDE SCH MG: 20 TABLET ORAL at 08:46

## 2019-04-02 RX ADMIN — Medication SCH DOSE: at 08:47

## 2019-04-02 RX ADMIN — POTASSIUM CHLORIDE SCH MEQ: 750 TABLET, EXTENDED RELEASE ORAL at 20:22

## 2019-04-02 RX ADMIN — INSULIN LISPRO SCH UNITS: 100 INJECTION, SOLUTION INTRAVENOUS; SUBCUTANEOUS at 20:57

## 2019-04-02 RX ADMIN — GUAIFENESIN PRN ML: 200 SOLUTION ORAL at 08:50

## 2019-04-02 RX ADMIN — OMEPRAZOLE SCH MG: 20 CAPSULE, DELAYED RELEASE ORAL at 08:45

## 2019-04-02 RX ADMIN — ACETAMINOPHEN PRN MG: 325 TABLET ORAL at 06:05

## 2019-04-02 RX ADMIN — WARFARIN SODIUM SCH MG: 5 TABLET ORAL at 17:30

## 2019-04-02 RX ADMIN — LEVOTHYROXINE SODIUM SCH MCG: 25 TABLET ORAL at 06:00

## 2019-04-02 RX ADMIN — ALBUTEROL SULFATE PRN MG: 2.5 SOLUTION RESPIRATORY (INHALATION) at 00:36

## 2019-04-02 RX ADMIN — INSULIN LISPRO SCH UNITS: 100 INJECTION, SOLUTION INTRAVENOUS; SUBCUTANEOUS at 17:31

## 2019-04-02 RX ADMIN — INSULIN LISPRO SCH UNITS: 100 INJECTION, SOLUTION INTRAVENOUS; SUBCUTANEOUS at 12:18

## 2019-04-02 RX ADMIN — NYSTATIN SCH DOSE: 100000 POWDER TOPICAL at 20:57

## 2019-04-02 RX ADMIN — NYSTATIN SCH DOSE: 100000 POWDER TOPICAL at 08:47

## 2019-04-02 RX ADMIN — GUAIFENESIN PRN ML: 200 SOLUTION ORAL at 00:28

## 2019-04-02 RX ADMIN — POTASSIUM CHLORIDE SCH MEQ: 750 TABLET, EXTENDED RELEASE ORAL at 08:46

## 2019-04-03 VITALS — DIASTOLIC BLOOD PRESSURE: 70 MMHG | SYSTOLIC BLOOD PRESSURE: 140 MMHG

## 2019-04-03 LAB
BASOPHILS # BLD AUTO: 0.1 10^3/UL (ref 0–0.2)
BASOPHILS NFR BLD AUTO: 1.3 % (ref 0–1)
BUN SERPL-MCNC: 25 MG/DL (ref 7–18)
CALCIUM SERPL-MCNC: 8.4 MG/DL (ref 8.8–10.2)
CHLORIDE SERPL-SCNC: 104 MEQ/L (ref 98–107)
CO2 SERPL-SCNC: 31 MEQ/L (ref 21–32)
CREAT SERPL-MCNC: 1.05 MG/DL (ref 0.55–1.3)
EOSINOPHIL # BLD AUTO: 0.3 10^3/UL (ref 0–0.5)
EOSINOPHIL NFR BLD AUTO: 4.7 % (ref 0–3)
GFR SERPL CREATININE-BSD FRML MDRD: 53.3 ML/MIN/{1.73_M2} (ref 32–?)
GLUCOSE SERPL-MCNC: 113 MG/DL (ref 70–100)
HCT VFR BLD AUTO: 38.7 % (ref 36–47)
HGB BLD-MCNC: 12.2 G/DL (ref 12–15.5)
LYMPHOCYTES # BLD AUTO: 1.3 10^3/UL (ref 1.5–4.5)
LYMPHOCYTES NFR BLD AUTO: 23.8 % (ref 24–44)
MAGNESIUM SERPL-MCNC: 1.9 MG/DL (ref 1.8–2.4)
MCH RBC QN AUTO: 28.4 PG (ref 27–33)
MCHC RBC AUTO-ENTMCNC: 31.5 G/DL (ref 32–36.5)
MCV RBC AUTO: 90.2 FL (ref 80–96)
MONOCYTES # BLD AUTO: 0.5 10^3/UL (ref 0–0.8)
MONOCYTES NFR BLD AUTO: 9.6 % (ref 0–5)
NEUTROPHILS # BLD AUTO: 3.3 10^3/UL (ref 1.8–7.7)
NEUTROPHILS NFR BLD AUTO: 60.2 % (ref 36–66)
PLATELET # BLD AUTO: 192 10^3/UL (ref 150–450)
POTASSIUM SERPL-SCNC: 4 MEQ/L (ref 3.5–5.1)
RBC # BLD AUTO: 4.29 10^6/UL (ref 4–5.4)
SODIUM SERPL-SCNC: 141 MEQ/L (ref 136–145)
WBC # BLD AUTO: 5.5 10^3/UL (ref 4–10)

## 2019-04-03 RX ADMIN — INSULIN LISPRO SCH UNITS: 100 INJECTION, SOLUTION INTRAVENOUS; SUBCUTANEOUS at 07:55

## 2019-04-03 RX ADMIN — POTASSIUM CHLORIDE SCH MEQ: 750 TABLET, EXTENDED RELEASE ORAL at 07:54

## 2019-04-03 RX ADMIN — GUAIFENESIN PRN ML: 200 SOLUTION ORAL at 07:54

## 2019-04-03 RX ADMIN — TORSEMIDE SCH MG: 20 TABLET ORAL at 07:56

## 2019-04-03 RX ADMIN — NYSTATIN SCH DOSE: 100000 POWDER TOPICAL at 07:56

## 2019-04-03 RX ADMIN — LEVOTHYROXINE SODIUM SCH MCG: 25 TABLET ORAL at 05:33

## 2019-04-03 RX ADMIN — ACETAMINOPHEN PRN MG: 325 TABLET ORAL at 07:55

## 2019-04-03 RX ADMIN — OMEPRAZOLE SCH MG: 20 CAPSULE, DELAYED RELEASE ORAL at 07:54

## 2019-04-03 RX ADMIN — Medication SCH DOSE: at 07:56

## 2019-04-03 NOTE — IPNPDOC
Text Note


Date of Service


The patient was seen on 4/3/19.





NOTE


Patient's discharge was delayed by 48 hrs due to prior authorization for insur

john. There have been no acute events noted since previous discharge orders. 

Please refer to D/C summary from 4/1/19 for information regarding the patient's 

hospital course.





VS,Fishbone, I+O


VS, Fishbone, I+O


Laboratory Tests


4/3/19 05:22








Red Blood Count 4.29, Mean Corpuscular Volume 90.2, Mean Corpuscular Hemoglobin 

28.4, Mean Corpuscular Hemoglobin Concent 31.5 L, Red Cell Distribution Width 

15.3 H, Neutrophils (%) (Auto) 60.2, Lymphocytes (%) (Auto) 23.8 L, Monocytes 

(%) (Auto) 9.6 H, Eosinophils (%) (Auto) 4.7 H, Basophils (%) (Auto) 1.3 H, Ne

utrophils # (Auto) 3.3, Lymphocytes # (Auto) 1.3 L, Monocytes # (Auto) 0.5, 

Eosinophils # (Auto) 0.3, Basophils # (Auto) 0.1, Calcium Level 8.4 L








Vital Signs








  Date Time  Temp Pulse Resp B/P (MAP) Pulse Ox O2 Delivery O2 Flow Rate FiO2


 


4/3/19 06:00 99.2 70 17 140/70 (93) 99   














I&O- Last 24 Hours up to 6 AM 


 


 4/3/19





 06:00


 


Intake Total 2015 ml


 


Output Total 0 ml


 


Balance 2015 ml

















DUGLAS QUIROZ MD               Apr 3, 2019 17:00

## 2019-09-19 ENCOUNTER — HOSPITAL ENCOUNTER (OUTPATIENT)
Dept: HOSPITAL 53 - M RAD | Age: 84
End: 2019-09-19
Attending: PHYSICIAN ASSISTANT
Payer: MEDICARE

## 2019-09-19 DIAGNOSIS — L97.812: ICD-10-CM

## 2019-09-19 DIAGNOSIS — I89.0: ICD-10-CM

## 2019-09-19 DIAGNOSIS — I87.311: Primary | ICD-10-CM

## 2019-09-19 NOTE — REP
BILATERAL LOWER EXTREMITY DUPLEX DOPPLER ARTERIAL ULTRASOUND:

 

Real-time ultrasound evaluation and duplex Doppler interrogation of bilateral

lower extremity arterial system is performed. The study is somewhat limited due

to patient body habitus and lymphedema. The vessels are unable to be compressed

and JJ could not be performed.

 

Distal right anterior and posterior tibial arteries could not be visualized.

There is no compelling evidence for significant stenosis of the visualized

bilateral lower extremity arterial systems. Biphasic waveforms are seen in the

common femoral arteries bilaterally with monophasic waveforms distal to that

level bilaterally.

 

PSV             Right              Left

 

CFA             125.2 cm/s         109 cm/s

 

Profunda        79.3 cm/s          62.3 cm/s

 

Proximal SFA    149.6 cm/s         145.3 cm/s

 

Mid SFA         174.9 cm/s         133.3 cm/s

 

Distal SFA      129.1 cm/s         122.7 cm/s

 

Popliteal       93.6 cm/s          84.5 cm/s

 

Proximal TIFFANIE    47.0 cm/s          99.1 cm/s

 

TP trunk        93.2 cm/s          59.1 cm/s

 

Proximal PTA    26 cm/s            73.2 cm/s

 

Distal PTA      not visualized     59.7 cm/s

 

Distal TIFFANIE      not visualized     86.2 cm/s

 

 

Electronically Signed by

Rishabh Noonan MD 09/19/2019 05:58 P

## 2019-09-30 ENCOUNTER — HOSPITAL ENCOUNTER (OUTPATIENT)
Dept: HOSPITAL 53 - M PT | Age: 84
End: 2019-09-30
Attending: SURGERY
Payer: MEDICARE

## 2019-09-30 DIAGNOSIS — Z51.89: Primary | ICD-10-CM

## 2019-09-30 DIAGNOSIS — T14.8XXA: ICD-10-CM

## 2019-10-01 ENCOUNTER — HOSPITAL ENCOUNTER (OUTPATIENT)
Dept: HOSPITAL 53 - M PT | Age: 84
LOS: 30 days | End: 2019-10-31
Attending: SURGERY
Payer: MEDICARE

## 2019-10-01 DIAGNOSIS — T14.8XXA: Primary | ICD-10-CM

## 2019-10-21 ENCOUNTER — HOSPITAL ENCOUNTER (INPATIENT)
Dept: HOSPITAL 53 - M ED | Age: 84
LOS: 8 days | Discharge: NURSING, REHAB OR LONG-TERM CARE | DRG: 872 | End: 2019-10-29
Attending: INTERNAL MEDICINE | Admitting: INTERNAL MEDICINE
Payer: MEDICARE

## 2019-10-21 VITALS — BODY MASS INDEX: 40.91 KG/M2 | HEIGHT: 64 IN | WEIGHT: 239.64 LBS

## 2019-10-21 VITALS — SYSTOLIC BLOOD PRESSURE: 112 MMHG | DIASTOLIC BLOOD PRESSURE: 55 MMHG

## 2019-10-21 VITALS — SYSTOLIC BLOOD PRESSURE: 140 MMHG | DIASTOLIC BLOOD PRESSURE: 72 MMHG

## 2019-10-21 VITALS — SYSTOLIC BLOOD PRESSURE: 108 MMHG | DIASTOLIC BLOOD PRESSURE: 53 MMHG

## 2019-10-21 VITALS — DIASTOLIC BLOOD PRESSURE: 77 MMHG | SYSTOLIC BLOOD PRESSURE: 127 MMHG

## 2019-10-21 VITALS — DIASTOLIC BLOOD PRESSURE: 62 MMHG | SYSTOLIC BLOOD PRESSURE: 124 MMHG

## 2019-10-21 DIAGNOSIS — Z88.8: ICD-10-CM

## 2019-10-21 DIAGNOSIS — Z88.2: ICD-10-CM

## 2019-10-21 DIAGNOSIS — Z95.0: ICD-10-CM

## 2019-10-21 DIAGNOSIS — J45.909: ICD-10-CM

## 2019-10-21 DIAGNOSIS — Z91.19: ICD-10-CM

## 2019-10-21 DIAGNOSIS — Z88.0: ICD-10-CM

## 2019-10-21 DIAGNOSIS — Z79.899: ICD-10-CM

## 2019-10-21 DIAGNOSIS — I48.91: ICD-10-CM

## 2019-10-21 DIAGNOSIS — Z95.2: ICD-10-CM

## 2019-10-21 DIAGNOSIS — E03.9: ICD-10-CM

## 2019-10-21 DIAGNOSIS — M19.90: ICD-10-CM

## 2019-10-21 DIAGNOSIS — I27.20: ICD-10-CM

## 2019-10-21 DIAGNOSIS — G47.33: ICD-10-CM

## 2019-10-21 DIAGNOSIS — E11.40: ICD-10-CM

## 2019-10-21 DIAGNOSIS — I87.2: ICD-10-CM

## 2019-10-21 DIAGNOSIS — A41.89: Primary | ICD-10-CM

## 2019-10-21 DIAGNOSIS — E66.01: ICD-10-CM

## 2019-10-21 DIAGNOSIS — I25.10: ICD-10-CM

## 2019-10-21 DIAGNOSIS — Z79.01: ICD-10-CM

## 2019-10-21 DIAGNOSIS — N39.0: ICD-10-CM

## 2019-10-21 DIAGNOSIS — I10: ICD-10-CM

## 2019-10-21 DIAGNOSIS — I34.0: ICD-10-CM

## 2019-10-21 LAB
ALBUMIN SERPL BCG-MCNC: 2.6 GM/DL (ref 3.2–5.2)
ALT SERPL W P-5'-P-CCNC: 16 U/L (ref 12–78)
APTT BLD: 46.2 SECONDS (ref 25–38.4)
BASE EXCESS BLDA CALC-SCNC: -3.5 MMOL/L (ref -2–2)
BASOPHILS # BLD AUTO: 0.1 10^3/UL (ref 0–0.2)
BASOPHILS NFR BLD AUTO: 0.5 % (ref 0–1)
BILIRUB CONJ SERPL-MCNC: 0.2 MG/DL (ref 0–0.2)
BILIRUB SERPL-MCNC: 1 MG/DL (ref 0.2–1)
CK MB CFR.DF SERPL CALC: 1.01
CK MB SERPL-MCNC: 1 NG/ML (ref ?–3.6)
CK SERPL-CCNC: 99 U/L (ref 26–192)
CO2 BLDA CALC-SCNC: 19 MEQ/L (ref 23–31)
EOSINOPHIL # BLD AUTO: 0 10^3/UL (ref 0–0.5)
EOSINOPHIL NFR BLD AUTO: 0.1 % (ref 0–3)
HCO3 BLDA-SCNC: 18.2 MEQ/L (ref 22–26)
HCO3 STD BLDA-SCNC: 21.5 MEQ/L (ref 22–26)
HCT VFR BLD AUTO: 40.4 % (ref 36–47)
HGB BLD-MCNC: 12.8 G/DL (ref 12–15.5)
INR PPP: 2.87
LYMPHOCYTES # BLD AUTO: 1 10^3/UL (ref 1.5–5)
LYMPHOCYTES NFR BLD AUTO: 5.4 % (ref 24–44)
MCH RBC QN AUTO: 28.8 PG (ref 27–33)
MCHC RBC AUTO-ENTMCNC: 31.7 G/DL (ref 32–36.5)
MCV RBC AUTO: 91 FL (ref 80–96)
MONOCYTES # BLD AUTO: 0.7 10^3/UL (ref 0–0.8)
MONOCYTES NFR BLD AUTO: 3.9 % (ref 0–5)
NEUTROPHILS # BLD AUTO: 17.1 10^3/UL (ref 1.5–8.5)
NEUTROPHILS NFR BLD AUTO: 89.6 % (ref 36–66)
PCO2 BLDA: 25 MMHG (ref 35–45)
PH BLDA: 7.48 UNITS (ref 7.35–7.45)
PLATELET # BLD AUTO: 231 10^3/UL (ref 150–450)
PO2 BLDA: 69.8 MMHG (ref 75–100)
PROT SERPL-MCNC: 7 GM/DL (ref 6.4–8.2)
PROTHROMBIN TIME: 30 SECONDS (ref 11.8–14)
RBC # BLD AUTO: 4.44 10^6/UL (ref 4–5.4)
SAO2 % BLDA: 95.2 % (ref 95–99)
T4 SERPL-MCNC: 10.8 UG/DL (ref 4.5–12)
TROPONIN I SERPL-MCNC: 0.03 NG/ML (ref ?–0.1)
TSH SERPL DL<=0.005 MIU/L-ACNC: 2.38 UIU/ML (ref 0.36–3.74)
WBC # BLD AUTO: 19.1 10^3/UL (ref 4–10)

## 2019-10-21 RX ADMIN — POTASSIUM CHLORIDE SCH MEQ: 750 TABLET, EXTENDED RELEASE ORAL at 08:22

## 2019-10-21 RX ADMIN — NYSTATIN SCH DOSE: 100000 CREAM TOPICAL at 21:04

## 2019-10-21 RX ADMIN — WARFARIN SODIUM SCH MG: 3 TABLET ORAL at 17:13

## 2019-10-21 RX ADMIN — NYSTATIN SCH DOSE: 100000 CREAM TOPICAL at 15:08

## 2019-10-21 RX ADMIN — CEFEPIME HYDROCHLORIDE SCH MLS/HR: 2 INJECTION, POWDER, FOR SOLUTION INTRAVENOUS at 21:05

## 2019-10-21 RX ADMIN — LEVOTHYROXINE SODIUM SCH MCG: 25 TABLET ORAL at 08:23

## 2019-10-21 RX ADMIN — TORSEMIDE SCH MG: 20 TABLET ORAL at 08:24

## 2019-10-21 RX ADMIN — OMEPRAZOLE SCH MG: 20 CAPSULE, DELAYED RELEASE ORAL at 08:23

## 2019-10-21 RX ADMIN — POTASSIUM CHLORIDE SCH MEQ: 750 TABLET, EXTENDED RELEASE ORAL at 21:04

## 2019-10-21 NOTE — PHACANCOPD
PHARMACY VANCOMYCIN DOSING


Pt Demographics


Demographics


Patient Age:84 , Weight:111.400  , Gender: female


Adjusted Body Weight


Date: 10/21/19, Adjusted Body Weight:  Kg





Events Past 24 Hours


Events Past 24 Hours:  YES: Elevation in WBC; 


   NO: Dialysis, Diuretic Therapy, Change in CrCl, Fever, Pending Diagnostics, 

Pending Procedures, Other





Vancomycin


Vancomycin Target Ranges:  15-20 mcg/ml


Vancomycin Load Y/N:  Yes


Load Dose Date Time


Vancomycin Load Dose:  2G       Date:  10/21/19       Time: 1300


Vancomycin Dose


Date: 10/21/19. Current Vancomycin Dose:


Intermittent Dosing?:  No





Labs


Labs





                                   Vital Signs








Label Value  Date Time


 


Patient Temperature 98.0 degrees F 10/21/19 1015


 


Temperature Source Temporal 10/21/19 1015


 


Patient Temperature 98.3 degrees F 10/21/19 0438


 


Temperature Source Oral 10/21/19 0438














Item Value  Date Time


 


White Blood Count 19.1 10^3/uL H 10/21/19 0356


 


POC Creatinine (Misc Panel) 1.2 MG/DL 10/21/19 0349


 


Lactic Acid Followup at 4 Hours 5.4 MMOL/L *H 10/21/19 0812


 


Lactic Acid Level 3.1 MMOL/L *H 10/21/19 0356


 


NT-Pro-B-Type Natriuretic Peptide 3185 PG/ML H 10/21/19 0356








Micro





Microbiology


10/21/19 Blood Culture, Received


           Pending


10/21/19 Respiratory Virus Panel (PCR) ([facility]) - Final, Complete


           


10/21/19 Blood Culture, Received


           Pending


Creatinine Clearance


Date:10/21/19. Creatinine Clearance: .





Assessment and Plan


Maintaining Current Dose?:  Yes


Reason for dose change:  No Dose Change





Pharmacist Note


Pharmacist Note


Date: 10/21/19. Pharmacist note:  Pt. is a 84 year old male who was brought in 

by EMS with SOB/chest pain and increased BLE swelling/edema. He was started on 

Vanco and Cefepime for empiric sepsis coverage. I have ordered a MRSA PCR to h

elp rule out MRSA pneumonia as the cause. WBC are elevated at 19.1. Pt has no 

fever. I have loaded the patient with Vanco 2G followed by 1500mg IV Q18H. The 

pt has no Vanco or MRSA hx with us. We will continue to monitor and adjust dose 

as needed.











[facility]     Oct 21, 2019 11:59

## 2019-10-21 NOTE — HPEPDOC
[facility] Medical History & Physical


Date of Admission


Oct 21, 2019


Date of Service:  Oct 21, 2019





History and Physical


CHIEF COMPLAINT: Severe weakness





HISTORY OF PRESENT ILLNESS:


Patient is a 84F with PMH HTN, HLD, Chronic venous stasis with leg wounds, hx 

DVT/PE, LARRY noncompliant cpap, hx complete heart block s/p pacemaker, hx AVR on 

warfarin, Afib, Hypothyroidism presented to the ER with complaints of worsening 

severe weakness. She states that she has been coughing for years now which is 

not new for her but she has been feeling very weak and that she feel that she is

fluid overloaded with increase swelling and slight SOB. She denies any other 

acute complaints apart from that feeling. Denies chest pain or any other 

complaints. In ER, patient BNP was 3000s, Lactic acid, WBC 19, LA 3.1->5.4. She 

has no reported abdominal pain. Was given Lasix in ER for treatment of CHF. BP 

hypotensive initially but MAP was 70 at the time she was seen by myself. 





PAST MEDICAL HISTORY:


Refer to [facility]





PAST SURGICAL HISTORY:


AVR


Pacemaker 


Appendectomy


Cholecystectomy





SOCIAL HISTORY:


Denies tobacco, alcohol or illicit drug use. 





FAMILY HISTORY:


Father with metastatic cancer of unknown primary





ALLERGIES: Please see below.





REVIEW OF SYSTEMS:


10 point review of system negative except as stated in HPI





HOME MEDICATIONS: Please see below. 





PHYSICAL EXAMINATION:


General: No acute distress,  Alert, morbidly obese BMI 42 with limited mobility


Eyes: Normal sclera, EOMI, BEBE


HENT: Atraumatic, neck supple, moist mucous membranes


Cardiovascular: Normal rate. 


Pulmonary: Minimal crackles in b/l bases. No wheezing noted. Difficult to 

appreciate breath sounds given body habitus. 


GI: Soft, nontender, obese


Skin: Warm and dry. b/l LE with extensive skin erythema and chronic venous 

stasis changes with overlying dressing. 


Neuro: CN grossly intact. Generalized weakness worse in LE. 


Psych: oriented x 3





LABORATORY DATA: See below.





IMAGING:


CXR-


IMPRESSION:


Cardiomegaly. Chronic lung findings as above with no acute infiltrate.





CT chest Angio-


IMPRESSION: 


1. There has been little change from 3/24/2019. No acute interval pulmonary 


embolism is identified. 


2. Minimal bilateral lower lobe infiltrates or atelectasis, increased since the 


prior study. 


3. 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 


4. Cardiomegaly with TAVR and pacemaker. 





MICROBIOLOGY: Please see below. 





ASSESSMENT AND PLAN: 


1. Sepsis 


- Initial presentation with hypotension, lactic acidosis 3.1->5.4, and 

leukocytosis WBC 19.1.


- No evidence of PNA or any urinary symptoms suggestive of urinary infection. 

However, does have chronic LE wounds. 


- Was not treated for sepsis initially but I think patient should be on empiric 

treatment until cultures return. 


- f/u UA, Urine and blood cultures. 


- Antibiotics started. Fluids not given given her reported feeling SOB and being

fluid overloaded. Difficult to tell in LE as it appears to be 

swelling/erythematous chronically. 


- BP improving. MAP 70 when seen with . 


- Repeat ECHO. 


2. HTN hx? 


- No medications seen. BP low/borderline. 


- No medications needed at this time. 


3. PAD


- Follows with vascular as outpatient. 


- resume home meds. 


4. Chronic venous insufficiency with LE wounds


- Wound care consult. 


- Reportedly at baseline and has been treated for infection previously. 


- On empiric abx at this time. 


5. Hypothyroidism


- resume synthroid


6. Afib


- resume warfarin. monitor INR. 


7. s/p AVR


- continue with warfarin. Monitor INR. 


- Goal 2-3


8. LARRY with bipap noncompliant





Vital Signs





Vital Signs








  Date Time  Temp Pulse Resp B/P (MAP) Pulse Ox O2 Delivery O2 Flow Rate FiO2


 


10/21/19 10:15 98.0 69 32 124/62 (82) 100 Room Air  











Laboratory Data


Labs 24H


Laboratory Tests 2


10/21/19 03:49: 


POC Glucose (Misc Panel) 169H, POC Sodium (Misc Panel) 140, POC Potassium (Misc 

Panel) 5.5H, POC Chloride (Misc Panel) 108, POC Total CO2 (Misc Panel) 22.0L, 

POC Blood Urea Nitrogen (Misc Panel 14, POC Ionized Calcium (Misc Panel) 4.2L, 

POC Creatinine (Misc Panel) 1.2, POC Hematocrit (Misc Panel) 40.0


10/21/19 03:56: 


Immature Granulocyte % (Auto) 0.5, Neutrophils (%) (Auto) 89.6H, Lymphocytes (%)

(Auto) 5.4L, Monocytes (%) (Auto) 3.9, Eosinophils (%) (Auto) 0.1, Basophils (%)

(Auto) 0.5, Neutrophils # (Auto) 17.1H, Lymphocytes # (Auto) 1.0L, Monocytes # 

(Auto) 0.7, Eosinophils # (Auto) 0.0, Basophils # (Auto) 0.1, Nucleated Red 

Blood Cells % (auto) 0.0, Lactic Acid Level 3.1*H, Total Bilirubin 1.0, Direct 

Bilirubin 0.2, Aspartate Amino Transf (AST/SGOT) 42H, Alanine Aminotransferase 

(ALT/SGPT) 16, Alkaline Phosphatase 111, Total Creatine Kinase 99, Creatine 

Kinase MB 1.0, Creatine Kinase MB Relative Index 1.01, Troponin I 0.03, 

NT-Pro-B-Type Natriuretic Peptide 3185H, Total Protein 7.0, Albumin 2.6L, 

Albumin/Globulin Ratio 0.59L, Thyroid Stimulating Hormone (TSH) 2.380, Thyroxine

(T4) 10.8


10/21/19 04:00: 


Blood Gas Bicarbonate Standard 21.5L, Arterial Blood pH 7.481H, Arterial Blood 

Partial Pressure CO2 25.0L, Arterial Blood Partial Pressure O2 69.8L, Arterial 

Blood Total CO2 19.0L, Arterial Blood HCO3 18.2L, Arterial Blood Base Excess -

3.5L, Arterial Blood Oxygen Saturation 95.2


10/21/19 05:16: 


Prothrombin Time 30.0H, Prothromb Time International Ratio 2.87, Activated 

Partial Thromboplast Time 46.2H


10/21/19 08:12: Lactic Acid Followup at 4 Hours 5.4*H


CBC/BMP


Laboratory Tests


10/21/19 03:56








Microbiology





Microbiology


10/21/19 Blood Culture, Received


           Pending


10/21/19 Respiratory Virus Panel (PCR) ([facility]) - Final, Complete


           


10/21/19 Blood Culture, Received


           Pending





Home Medications


Scheduled


Levothyroxine Sodium (Synthroid) 25 Mcg Tab, 25 MCG PO DAILY


Omeprazole (Omeprazole) 20 Mg Cap, 20 MG PO DAILY


Potassium Chloride (Potassium Chloride) 10 Meq Tab, 20 MEQ PO BID


Torsemide (Torsemide) 20 Mg Tablet, 20 MG PO DAILY


Warfarin Sodium (Warfarin Sodium) 3 Mg Tablet, 3 MG PO 2XWK


   TAKES ON WEDNESDAY AND THURSDAY 


Warfarin Sodium (Warfarin Sodium) 6 Mg Tablet, 6 MG PO 5XW


   TAKES ON MONDAY, TUESDAY, FRIDAY, SATURDAY AND SUNDAY. ORDERED AS DAILY, BUT 

PATIENT STATES SHE TAKES THE 6MG 5 DAYS A WEEK 





Scheduled PRN


Acetaminophen (Acetaminophen) 500 Mg Tab, 1,000 MG PO Q6H PRN for PAIN


Albuterol Sulf (Albuterol Sulfate) 2.5 Mg/3 Ml Nebu, 1 INH INH Q4H PRN for 

SHORTNESS OF BREATH


Sennosides/Docusate Sodium (Senna Plus Tablet) 1 Tab Tab, 1 TAB PO BID PRN for 

CONSTIPATION





Allergies


Coded Allergies:  


     dirithromycin (Verified  Allergy, Intermediate, hives, 3/27/19)


     Sulfa (Sulfonamide Antibiotics) (Verified  Allergy, Unknown, 3/27/19)


     Penicillins (Verified  Adverse Reaction, Intermediate, tachycardia 40 yrs 

ago, 3/27/19)





A-FIB/CHADSVASC


A-FIB History


Current/History of A-Fib/PAF?:  Yes


Current PO Anticoag Therapy:  Yes











LIZETTE TRIVEDI MD                Oct 21, 2019 11:56

## 2019-10-21 NOTE — REPVR
PROCEDURE INFORMATION: 

Exam: CT Angiography Chest With Contrast 

Exam date and time: 10/21/2019 5:39 AM 

Clinical history: 84 years old, female; Chest pain; Additional info: SOB 



TECHNIQUE: 

Imaging protocol: Computed tomographic angiography of the chest with 

intravenous contrast. 

3D rendering: MIP reconstructed images were created and reviewed. 

Radiation optimization: All CT scans at this facility use at least one of these 

dose optimization techniques: automated exposure control; mA and/or kV 

adjustment per patient size (includes targeted exams where dose is matched to 

clinical indication); or iterative reconstruction. 

Contrast material: ; Contrast volume: 75 ml; Contrast route: IV;  



COMPARISON: 

CT ANGIO CHEST 3/24/2019 1:14 PM 



FINDINGS: 

Tubes, catheters and devices: Pacemaker in position from the left. 

Pulmonary arteries: The main pulmonary artery measures 28 mm. No pulmonary 

embolism is identified. 

Aorta: The ascending thoracic aorta measures 26 mm. No gross or obvious aortic 

dissection is identified distal to the mid arch. Artifact and image degradation 

precludes detailed evaluation of the ascending thoracic aorta. 

Lungs: Minimal bilateral lower lobe infiltrates or atelectasis. 

Pleural space: Unremarkable. No pneumothorax. No pleural effusion. 

Heart: Status post TAVR. The left atrium measures 5.8 cm in its AP dimension. 

Spleen: Multiple splenic calcifications. 

Lymph nodes: Unremarkable. No enlarged lymph nodes. 

Bones/joints: Unremarkable. No acute fracture. 

Soft tissues: Unremarkable. 



IMPRESSION: 

1. There has been little change from 3/24/2019. No acute interval pulmonary 

embolism is identified. 

2. Minimal bilateral lower lobe infiltrates or atelectasis, increased since the 

prior study. 

3. 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 

4. Cardiomegaly with TAVR and pacemaker. 



Electronically signed by: Alex Millard On 10/21/2019  06:07:15 AM

## 2019-10-21 NOTE — REP
CHEST, SINGLE VIEW:

 

Single view of the chest is performed. Comparison is 03/24/2019.

 

There is cardiomegaly unchanged. There is pulmonary venous hypertension and mild

chronic interstitial prominence which is stable. No consolidating infiltrate is

seen. There is calcification of the thoracic aorta. The mediastinal silhouette is

unchanged. There is a prosthetic heart valve. There is a left pace maker

unchanged.

 

IMPRESSION:

 

Cardiomegaly. Chronic lung findings as above with no acute infiltrate.

 

 

Electronically Signed by

Rishabh Noonan MD 10/22/2019 05:26 P

## 2019-10-21 NOTE — ECHO
DATE OF PROCEDURE:  10/21/2019

 

YOB: 1935

AGE:  84

GENDER:  Female

HEIGHT:  64 inches

WEIGHT:  244 pounds

BODY SURFACE AREA:  2.14 meters squared

INPATIENT:  ICU, room 3203

 

REFERRING PHYSICIAN:  Dr. Cihdi Wilson

INDICATION:  Dyspnea.

 

MEASUREMENTS:

2D Measurements:

RV:  4.9 cm

LV:  4.2 cm

Septum:  1.0 cm

Posterior wall:  1.0 cm

Aortic root:  2.5 cm

LA:  4.7 cm

LVEF:  50%

 

DOPPLER MEASUREMENTS:

AV:  1.24 meters per second

LVOT:  1.0 meters per second

MV-E:  190

Early mitral deceleration time:  426 milliseconds

Mean MV diastolic gradient:  9 mmHg

Mitral valve area:  1.07 square centimeters

PV:  0.8 meters per second

Pulmonary artery acceleration time:  74 milliseconds

RVSP:  90 mmHg

IVC:  2.8 cm

 

COMMENTS:

Underlying atrial fibrillation with consistent ventricular paced rhythm.  Paced

QRS complexes with left bundle branch block configuration.

 

Technically challenging study in light of the patient's body habitus but

diagnostically useful information was still obtained.

 

M-mode and two-dimensional echocardiography was performed with pulsed, 
continuous

wave, color flow and tissue Doppler studies.

 

Normal left ventricular size and wall thickness with paradoxical septal motion

related to right ventricular pacing and at least mild impairment of global

resting systolic function.

 

Prominently dilated left atrium.  Unable to assess LV diastolic function in 
light

of mitral valve disorder and atrial fibrillation.

 

Prominently dilated right heart chambers with right ventricular free wall

hypokinesis and Doppler evidence of severe pulmonary hypertension.

 

Prominently dilated right atrium and inferior vena cava with absent respiratory

collapse in keeping with a markedly elevated central venous pressure.

 

Appropriate function of a bioprosthetic aortic valve.

 

Normal aortic dimensions.

 

Severe mitral annular calcification with moderate left ventricular inflow tract

obstruction and mild mitral insufficiency.

 

Normal appearing tricuspid valve with at least moderate insufficiency.

 

Pacing leads could be visualized traversing right heart structures.

 

No pericardial effusion.

 

This patient's prognosis has been known to be severely impaired dating back to

previously dictated echocardiogram March 2019.  She has very severe pulmonary

hypertension and right heart failure that is not correctable.

MTDD

## 2019-10-22 VITALS — SYSTOLIC BLOOD PRESSURE: 124 MMHG | DIASTOLIC BLOOD PRESSURE: 58 MMHG

## 2019-10-22 VITALS — SYSTOLIC BLOOD PRESSURE: 117 MMHG | DIASTOLIC BLOOD PRESSURE: 74 MMHG

## 2019-10-22 VITALS — DIASTOLIC BLOOD PRESSURE: 63 MMHG | SYSTOLIC BLOOD PRESSURE: 115 MMHG

## 2019-10-22 VITALS — DIASTOLIC BLOOD PRESSURE: 65 MMHG | SYSTOLIC BLOOD PRESSURE: 128 MMHG

## 2019-10-22 VITALS — DIASTOLIC BLOOD PRESSURE: 56 MMHG | SYSTOLIC BLOOD PRESSURE: 117 MMHG

## 2019-10-22 VITALS — DIASTOLIC BLOOD PRESSURE: 56 MMHG | SYSTOLIC BLOOD PRESSURE: 116 MMHG

## 2019-10-22 LAB
BASOPHILS # BLD AUTO: 0.2 10^3/UL (ref 0–0.2)
BASOPHILS NFR BLD AUTO: 0.6 % (ref 0–1)
BUN SERPL-MCNC: 21 MG/DL (ref 7–18)
CALCIUM SERPL-MCNC: 8 MG/DL (ref 8.8–10.2)
CHLORIDE SERPL-SCNC: 105 MEQ/L (ref 98–107)
CO2 SERPL-SCNC: 23 MEQ/L (ref 21–32)
CREAT SERPL-MCNC: 1.45 MG/DL (ref 0.55–1.3)
EOSINOPHIL # BLD AUTO: 0.1 10^3/UL (ref 0–0.5)
EOSINOPHIL NFR BLD AUTO: 0.2 % (ref 0–3)
GFR SERPL CREATININE-BSD FRML MDRD: 36.6 ML/MIN/{1.73_M2} (ref 32–?)
GLUCOSE SERPL-MCNC: 135 MG/DL (ref 70–100)
HCT VFR BLD AUTO: 36 % (ref 36–47)
HCT VFR BLD AUTO: 39.1 % (ref 36–47)
HGB BLD-MCNC: 11.3 G/DL (ref 12–15.5)
HGB BLD-MCNC: 12.1 G/DL (ref 12–15.5)
INR PPP: 3.82
LYMPHOCYTES # BLD AUTO: 1.4 10^3/UL (ref 1.5–5)
LYMPHOCYTES NFR BLD AUTO: 5.3 % (ref 24–44)
MCH RBC QN AUTO: 28.3 PG (ref 27–33)
MCH RBC QN AUTO: 28.3 PG (ref 27–33)
MCHC RBC AUTO-ENTMCNC: 30.9 G/DL (ref 32–36.5)
MCHC RBC AUTO-ENTMCNC: 31.4 G/DL (ref 32–36.5)
MCV RBC AUTO: 90.2 FL (ref 80–96)
MCV RBC AUTO: 91.4 FL (ref 80–96)
MONOCYTES # BLD AUTO: 1 10^3/UL (ref 0–0.8)
MONOCYTES NFR BLD AUTO: 4 % (ref 0–5)
NEUTROPHILS # BLD AUTO: 22.6 10^3/UL (ref 1.5–8.5)
NEUTROPHILS NFR BLD AUTO: 89.3 % (ref 36–66)
PLATELET # BLD AUTO: 153 10^3/UL (ref 150–450)
PLATELET # BLD AUTO: 155 10^3/UL (ref 150–450)
POTASSIUM SERPL-SCNC: 4.9 MEQ/L (ref 3.5–5.1)
PROTHROMBIN TIME: 37.7 SECONDS (ref 11.8–14)
RBC # BLD AUTO: 3.99 10^6/UL (ref 4–5.4)
RBC # BLD AUTO: 4.28 10^6/UL (ref 4–5.4)
SODIUM SERPL-SCNC: 137 MEQ/L (ref 136–145)
VANCOMYCIN SERPL-MCNC: 15.8 UG/ML
WBC # BLD AUTO: 25.4 10^3/UL (ref 4–10)
WBC # BLD AUTO: 28.1 10^3/UL (ref 4–10)

## 2019-10-22 RX ADMIN — ACETAMINOPHEN PRN MG: 325 TABLET ORAL at 01:38

## 2019-10-22 RX ADMIN — NYSTATIN SCH DOSE: 100000 CREAM TOPICAL at 20:56

## 2019-10-22 RX ADMIN — NYSTATIN SCH DOSE: 100000 CREAM TOPICAL at 09:09

## 2019-10-22 RX ADMIN — ALBUTEROL SULFATE PRN MG: 2.5 SOLUTION RESPIRATORY (INHALATION) at 23:00

## 2019-10-22 RX ADMIN — DEXTROSE MONOHYDRATE SCH MLS/HR: 50 INJECTION, SOLUTION INTRAVENOUS at 12:52

## 2019-10-22 RX ADMIN — CEFEPIME HYDROCHLORIDE SCH MLS/HR: 2 INJECTION, POWDER, FOR SOLUTION INTRAVENOUS at 09:10

## 2019-10-22 RX ADMIN — OMEPRAZOLE SCH MG: 20 CAPSULE, DELAYED RELEASE ORAL at 09:09

## 2019-10-22 RX ADMIN — LEVOTHYROXINE SODIUM SCH MCG: 25 TABLET ORAL at 06:03

## 2019-10-22 RX ADMIN — TORSEMIDE SCH MG: 20 TABLET ORAL at 09:08

## 2019-10-22 RX ADMIN — POTASSIUM CHLORIDE SCH MEQ: 750 TABLET, EXTENDED RELEASE ORAL at 09:08

## 2019-10-22 RX ADMIN — WARFARIN SODIUM SCH MG: 3 TABLET ORAL at 09:15

## 2019-10-22 RX ADMIN — POTASSIUM CHLORIDE SCH MEQ: 750 TABLET, EXTENDED RELEASE ORAL at 20:58

## 2019-10-22 RX ADMIN — SODIUM CHLORIDE SCH MLS/HR: 9 INJECTION, SOLUTION INTRAVENOUS at 11:46

## 2019-10-22 RX ADMIN — CEFEPIME HYDROCHLORIDE SCH MLS/HR: 2 INJECTION, POWDER, FOR SOLUTION INTRAVENOUS at 21:00

## 2019-10-22 NOTE — ECGEPIP
[facility] - ED

                                       

                                       Test Date:    2019-10-21

Pat Name:     ABBEY OSMAN            Department:   

Patient ID:   [id number]                 Room:         [place] [id number]

Gender:       Female                   Technician:   FL

:          1935               Requested By: BRANDEN CHA

Order Number: [id number]     Reading MD:   Lida Richter

                                 Measurements

Intervals                              Axis          

Rate:         69                       P:            

MT:           0                        QRS:          -73

QRSD:         157                      T:            73

QT:           435                                    

QTc:          468                                    

                           Interpretive Statements

ELECTRONIC VENTRICULAR PACEMAKER

ABNORMAL RHYTHM ECG

SIMILAR 3/24/19

Electronically Signed on 10- 9:56:29 EDT by Lida Richter

## 2019-10-22 NOTE — IPNPDOC
Date Seen


The patient was seen on 10/22/19.





Progress Note


SUBJECTIVE: 


Patient appeared clinical well and reports no current complaints. 


However, she stated that she had severe dysuria last night but had gotten 

better. 


T 100.9 overnight. WBC 19->28 today. 


Lactic acidosis resolved. BP has been stable. MAP 70-80s





OBJECTIVE


General: No acute distress,  Alert, morbidly obese BMI 42 with limited mobility


Eyes: Normal sclera, EOMI, BEBE


HENT: Atraumatic, neck supple, moist mucous membranes


Cardiovascular: Normal rate. 


Pulmonary: Minimal crackles in b/l bases. No wheezing noted. Difficult to 

appreciate breath sounds given body habitus. 


GI: Soft, nontender, obese


Skin: Warm and dry. b/l LE with extensive skin erythema and chronic venous 

stasis changes with overlying dressing. 


Neuro: CN grossly intact. Generalized weakness worse in LE. 


Psych: oriented x 3





IMAGING:


CXR-


IMPRESSION:


Cardiomegaly. Chronic lung findings as above with no acute infiltrate.





CT chest Angio-


IMPRESSION: 


1. There has been little change from 3/24/2019. No acute interval pulmonary 


embolism is identified. 


2. Minimal bilateral lower lobe infiltrates or atelectasis, increased since the 


prior study. 


3. 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 


4. Cardiomegaly with TAVR and pacemaker. 





MICROBIOLOGY: Please see below. 





ASSESSMENT AND PLAN: 


1. Sepsis 


- Initial presentation with hypotension, lactic acidosis 3.1->5.4, and 

leukocytosis WBC 19.1.


- No evidence of PNA but developed dysuria that had improved. 


- UA + LE and WBCs, f/u Urine and blood cultures. 


- Broad spectrum Antibiotics started. Fluids not given given her reported 

feeling SOB and being fluid overloaded. Difficult to tell in LE as it appears to

be swelling/erythematous chronically. 


- BP now stable, initially hypotensive. 


- Repeat ECHO noted. Patient with severe pulmonary HTN and R. sided heart 

failure. 


2. HTN hx? 


- No medications seen. BP low/borderline. 


- No medications needed at this time. 


3. PAD


- Follows with vascular as outpatient. 


- resume home meds. 


4. Chronic venous insufficiency with LE wounds


- Wound care consult. 


- Reportedly at baseline and has been treated for infection previously. 


- On empiric abx at this time. 


5. Hypothyroidism


- resume synthroid


6. Afib


- resume warfarin. monitor INR. Holding today's dose given supratheurapeutic 

INR.


7. s/p AVR


- continue with warfarin. Monitor INR. Holding today's dose given 

supratheurapeutic INR.


- Goal 2-3


8. LARRY with bipap noncompliant





VS, I&O, 24H, Fishbone


Vital Signs/I&O





Vital Signs








  Date Time  Temp Pulse Resp B/P (MAP) Pulse Ox O2 Delivery O2 Flow Rate FiO2


 


10/22/19 12:00 98.4 70 20 115/63 (80) 92   


 


10/22/19 04:00      Nasal Cannula 2.0 














I&O- Last 24 Hours up to 6 AM 


 


 10/22/19





 06:00


 


Intake Total 2030 ml


 


Output Total 900 ml


 


Balance 1130 ml











Laboratory Data


24H LABS


Laboratory Tests 2


10/21/19 18:40: Lactic Acid Level 3.7*H


10/21/19 22:45: Lactic Acid Level 3.4*H


10/22/19 05:20: 


Nucleated Red Blood Cells % (auto) 0.0, Prothrombin Time 37.7H, Prothromb Time 

International Ratio 3.82, Anion Gap 9, Glomerular Filtration Rate 36.6, Lactic 

Acid Followup at 4 Hours 1.8, Calcium Level 8.0L, Random Vancomycin Level 15.8


CBC/BMP


Laboratory Tests


10/22/19 05:20








Microbiology





Microbiology


10/21/19 Urine Culture, Received


           Pending


10/21/19 Blood Culture - Preliminary, Resulted


           


10/21/19 Respiratory Virus Panel (PCR) ([facility]) - Final, Complete


           


10/21/19 Blood Culture - Preliminary, Resulted











LIZETTE TRIVEDI MD                Oct 22, 2019 14:43

## 2019-10-22 NOTE — PHACANCOPD
PHARMACY VANCOMYCIN DOSING


Pt Demographics


Demographics


Patient Age:84 , Weight:109.000  , Gender: female


Adjusted Body Weight


Date: 10/21/19, Adjusted Body Weight:  Kg





Vancomycin


Vancomycin Target Ranges:  15-20 mcg/ml


Vancomycin Load Y/N:  Yes


Load Dose Date Time


Vancomycin Load Dose:  2G       Date:  10/21/19       Time: 1300


Vancomycin Dose


Date: 10/21/19. Current Vancomycin Dose:


Intermittent Dosing?:  No





Labs


Micro





Microbiology


10/21/19 Urine Culture, Received


           Pending


10/21/19 Blood Culture - Preliminary, Resulted


           


10/21/19 Respiratory Virus Panel (PCR) ([facility]) - Final, Complete


           


10/21/19 Blood Culture - Preliminary, Resulted


Creatinine Clearance


Date:10/21/19. Creatinine Clearance: .





Assessment and Plan


Maintaining Current Dose?:  No


Reason for dose change:  Change in serum Cr





Pharmacist Note


Pharmacist Note


Date: 10/21/19. Pharmacist note:  Pt. is a 84 year old male who was brought in 

by EMS with SOB/chest pain and increased BLE swelling/edema. He was started on 

Vanco and Cefepime for empiric sepsis coverage. I have ordered a MRSA PCR to 

help rule out MRSA pneumonia as the cause. WBC are elevated at 19.1. Pt has no 

fever. I have loaded the patient with Vanco 2G followed by 1500mg IV Q18H. The 

pt has no Vanco or MRSA hx with us. We will continue to monitor and adjust dose 

as needed.





DATE 10/22/19:  SCr today was 1.45, up from 1.2. Obtained a random at 0500 of 

15.8 which gives an estimated trough of 14.1 after the initial 2g load.  Due to 

decline in renal function I changed dose to 1500mg IV vancomycin q24h to start 

at 11am.  I scheduled a trough tomorrow 10/23 @1000.  I will continue to monitor

this patient and adjust dose as needed.











[facility]          Oct 22, 2019 13:53

## 2019-10-23 VITALS — DIASTOLIC BLOOD PRESSURE: 55 MMHG | SYSTOLIC BLOOD PRESSURE: 113 MMHG

## 2019-10-23 VITALS — DIASTOLIC BLOOD PRESSURE: 58 MMHG | SYSTOLIC BLOOD PRESSURE: 112 MMHG

## 2019-10-23 VITALS — DIASTOLIC BLOOD PRESSURE: 61 MMHG | SYSTOLIC BLOOD PRESSURE: 131 MMHG

## 2019-10-23 VITALS — SYSTOLIC BLOOD PRESSURE: 136 MMHG | DIASTOLIC BLOOD PRESSURE: 56 MMHG

## 2019-10-23 VITALS — SYSTOLIC BLOOD PRESSURE: 121 MMHG | DIASTOLIC BLOOD PRESSURE: 60 MMHG

## 2019-10-23 VITALS — SYSTOLIC BLOOD PRESSURE: 105 MMHG | DIASTOLIC BLOOD PRESSURE: 53 MMHG

## 2019-10-23 VITALS — SYSTOLIC BLOOD PRESSURE: 118 MMHG | DIASTOLIC BLOOD PRESSURE: 55 MMHG

## 2019-10-23 LAB
BUN SERPL-MCNC: 29 MG/DL (ref 7–18)
CALCIUM SERPL-MCNC: 8 MG/DL (ref 8.8–10.2)
CHLORIDE SERPL-SCNC: 105 MEQ/L (ref 98–107)
CO2 SERPL-SCNC: 24 MEQ/L (ref 21–32)
CREAT SERPL-MCNC: 1.49 MG/DL (ref 0.55–1.3)
GFR SERPL CREATININE-BSD FRML MDRD: 35.5 ML/MIN/{1.73_M2} (ref 32–?)
GLUCOSE SERPL-MCNC: 125 MG/DL (ref 70–100)
HCT VFR BLD AUTO: 36.1 % (ref 36–47)
HGB BLD-MCNC: 11.3 G/DL (ref 12–15.5)
INR PPP: 2.9
MCH RBC QN AUTO: 28.2 PG (ref 27–33)
MCHC RBC AUTO-ENTMCNC: 31.3 G/DL (ref 32–36.5)
MCV RBC AUTO: 90 FL (ref 80–96)
PLATELET # BLD AUTO: 157 10^3/UL (ref 150–450)
POTASSIUM SERPL-SCNC: 4.6 MEQ/L (ref 3.5–5.1)
PROTHROMBIN TIME: 30.2 SECONDS (ref 11.8–14)
RBC # BLD AUTO: 4.01 10^6/UL (ref 4–5.4)
SODIUM SERPL-SCNC: 135 MEQ/L (ref 136–145)
WBC # BLD AUTO: 16.6 10^3/UL (ref 4–10)

## 2019-10-23 RX ADMIN — POTASSIUM CHLORIDE SCH MEQ: 750 TABLET, EXTENDED RELEASE ORAL at 09:17

## 2019-10-23 RX ADMIN — ALBUTEROL SULFATE PRN MG: 2.5 SOLUTION RESPIRATORY (INHALATION) at 21:13

## 2019-10-23 RX ADMIN — DEXTROSE MONOHYDRATE SCH MLS/HR: 50 INJECTION, SOLUTION INTRAVENOUS at 11:40

## 2019-10-23 RX ADMIN — ALBUTEROL SULFATE PRN MG: 2.5 SOLUTION RESPIRATORY (INHALATION) at 03:58

## 2019-10-23 RX ADMIN — CEFEPIME HYDROCHLORIDE SCH MLS/HR: 2 INJECTION, POWDER, FOR SOLUTION INTRAVENOUS at 09:17

## 2019-10-23 RX ADMIN — POTASSIUM CHLORIDE SCH MEQ: 750 TABLET, EXTENDED RELEASE ORAL at 21:41

## 2019-10-23 RX ADMIN — OMEPRAZOLE SCH MG: 20 CAPSULE, DELAYED RELEASE ORAL at 09:17

## 2019-10-23 RX ADMIN — ALBUTEROL SULFATE SCH MG: 2.5 SOLUTION RESPIRATORY (INHALATION) at 23:26

## 2019-10-23 RX ADMIN — SODIUM CHLORIDE SCH MLS/HR: 9 INJECTION, SOLUTION INTRAVENOUS at 10:28

## 2019-10-23 RX ADMIN — NYSTATIN SCH DOSE: 100000 CREAM TOPICAL at 21:42

## 2019-10-23 RX ADMIN — SODIUM CHLORIDE, PRESERVATIVE FREE SCH ML: 5 INJECTION INTRAVENOUS at 14:12

## 2019-10-23 RX ADMIN — SODIUM CHLORIDE, PRESERVATIVE FREE SCH ML: 5 INJECTION INTRAVENOUS at 21:43

## 2019-10-23 RX ADMIN — BENZONATATE PRN MG: 100 CAPSULE ORAL at 21:41

## 2019-10-23 RX ADMIN — LEVOTHYROXINE SODIUM SCH MCG: 25 TABLET ORAL at 05:28

## 2019-10-23 RX ADMIN — CEFEPIME HYDROCHLORIDE SCH MLS/HR: 2 INJECTION, POWDER, FOR SOLUTION INTRAVENOUS at 21:42

## 2019-10-23 RX ADMIN — ACETAMINOPHEN PRN MG: 325 TABLET ORAL at 21:43

## 2019-10-23 RX ADMIN — WARFARIN SODIUM SCH MG: 3 TABLET ORAL at 17:40

## 2019-10-23 RX ADMIN — BENZONATATE PRN MG: 100 CAPSULE ORAL at 10:28

## 2019-10-23 RX ADMIN — NYSTATIN SCH DOSE: 100000 CREAM TOPICAL at 09:17

## 2019-10-23 RX ADMIN — TORSEMIDE SCH MG: 20 TABLET ORAL at 09:17

## 2019-10-23 NOTE — IPNPDOC
Date Seen


The patient was seen on 10/23/19.





Progress Note


SUBJECTIVE: 


Patient reports a persistent ongoing cough but otherwise has no complaints. 


Concern for swallowing yesterday due to cough, bedside swallow eval was placed. 


She reports no other complaints and state that she feels well otherwise. 


WBC 25->16. BP has been stable. 


Blood culture + Pseudomonas x 1. 





OBJECTIVE


General: No acute distress,  Alert, morbidly obese BMI 42 with limited mobility


Eyes: Normal sclera, EOMI, BEBE


HENT: Atraumatic, neck supple, moist mucous membranes


Cardiovascular: Normal rate. 


Pulmonary: Minimal crackles in b/l bases. No wheezing noted. Difficult to 

appreciate breath sounds given body habitus. 


GI: Soft, nontender, obese


Skin: Warm and dry. b/l LE with extensive skin erythema and chronic venous 

stasis changes with overlying dressing. 


Neuro: CN grossly intact. Generalized weakness worse in LE. 


Psych: oriented x 3





IMAGING:


CXR-


IMPRESSION:


Cardiomegaly. Chronic lung findings as above with no acute infiltrate.





CT chest Angio-


IMPRESSION: 


1. There has been little change from 3/24/2019. No acute interval pulmonary 


embolism is identified. 


2. Minimal bilateral lower lobe infiltrates or atelectasis, increased since the 


prior study. 


3. 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 


4. Cardiomegaly with TAVR and pacemaker. 





MICROBIOLOGY: Please see below. 





ASSESSMENT AND PLAN: 


1. Sepsis


- 2/2 UTI and bacteremia  


- Suspect LE wound although it is primarily erythematous but no active open 

wound noted at this time. 


- Initial presentation with hypotension, lactic acidosis 3.1->5.4, and 

leukocytosis WBC 19.1.


- No evidence of PNA but developed dysuria that had improved. 


- Blood culture + Pseudomonas x1. f/u 2nd culture. Urine culture + strep


- Broad spectrum Antibiotics started. 


- BP now stable, initially hypotensive. 


- Repeat ECHO noted. Patient with severe pulmonary HTN and R. sided heart 

failure. 


2. HTN hx? 


- No medications seen.  


- No medications needed at this time. 


3. PAD


- Follows with vascular as outpatient. 


- resume home meds. 


4. Chronic venous insufficiency with LE wounds


- Wound care consult. 


- Reportedly at baseline and has been treated for infection previously. 


- On empiric abx at this time. 


5. Hypothyroidism


- resume synthroid


6. Afib


- resume warfarin. monitor INR. 


7. s/p AVR


- continue with warfarin. Monitor INR. 


- Goal 2-3


8. LARRY with bipap noncompliant





VS, I&O, 24H, Fishbone


Vital Signs/I&O





Vital Signs








  Date Time  Temp Pulse Resp B/P (MAP) Pulse Ox O2 Delivery O2 Flow Rate FiO2


 


10/23/19 08:00 98.5 70 18 118/55 (76) 95 Nasal Cannula 1.0 














I&O- Last 24 Hours up to 6 AM 


 


 10/23/19





 05:59


 


Intake Total 1500 ml


 


Output Total 1450 ml


 


Balance 50 ml











Laboratory Data


24H LABS


Laboratory Tests 2


10/22/19 15:22: 


Immature Granulocyte % (Auto) 0.6, Neutrophils (%) (Auto) 89.3H, Lymphocytes (%)

(Auto) 5.3L, Monocytes (%) (Auto) 4.0, Eosinophils (%) (Auto) 0.2, Basophils (%)

(Auto) 0.6, Neutrophils # (Auto) 22.6H, Lymphocytes # (Auto) 1.4L, Monocytes # 

(Auto) 1.0H, Eosinophils # (Auto) 0.1, Basophils # (Auto) 0.2, Nucleated Red 

Blood Cells % (auto) 0.0


10/23/19 05:01: 


Nucleated Red Blood Cells % (auto) 0.0, Prothrombin Time 30.2H, Prothromb Time 

International Ratio 2.90, Anion Gap 6L, Glomerular Filtration Rate 35.5, Calcium

Level 8.0L


10/23/19 09:55: 


CBC/BMP


Laboratory Tests


10/22/19 15:22








10/23/19 05:01








Microbiology





Microbiology


10/21/19 Urine Culture - Final, Complete


           Streptococcus Sanguinis


10/21/19 Blood Culture - Preliminary, Resulted


           


10/21/19 Respiratory Virus Panel (PCR) ([facility]) - Final, Complete


           


10/21/19 Blood Culture - Final, Complete


           Pseudomonas Aeruginosa











LIZETTE TRIVEDI MD                Oct 23, 2019 10:17

## 2019-10-24 VITALS — SYSTOLIC BLOOD PRESSURE: 133 MMHG | DIASTOLIC BLOOD PRESSURE: 89 MMHG

## 2019-10-24 VITALS — DIASTOLIC BLOOD PRESSURE: 60 MMHG | SYSTOLIC BLOOD PRESSURE: 131 MMHG

## 2019-10-24 VITALS — DIASTOLIC BLOOD PRESSURE: 60 MMHG | SYSTOLIC BLOOD PRESSURE: 129 MMHG

## 2019-10-24 VITALS — DIASTOLIC BLOOD PRESSURE: 62 MMHG | SYSTOLIC BLOOD PRESSURE: 131 MMHG

## 2019-10-24 VITALS — SYSTOLIC BLOOD PRESSURE: 140 MMHG | DIASTOLIC BLOOD PRESSURE: 65 MMHG

## 2019-10-24 LAB
BUN SERPL-MCNC: 31 MG/DL (ref 7–18)
CALCIUM SERPL-MCNC: 7.7 MG/DL (ref 8.8–10.2)
CHLORIDE SERPL-SCNC: 105 MEQ/L (ref 98–107)
CO2 SERPL-SCNC: 25 MEQ/L (ref 21–32)
CREAT SERPL-MCNC: 1.45 MG/DL (ref 0.55–1.3)
GFR SERPL CREATININE-BSD FRML MDRD: 36.6 ML/MIN/{1.73_M2} (ref 32–?)
GLUCOSE SERPL-MCNC: 177 MG/DL (ref 70–100)
HCT VFR BLD AUTO: 34.3 % (ref 36–47)
HGB BLD-MCNC: 10.9 G/DL (ref 12–15.5)
INR PPP: 2.63
MCH RBC QN AUTO: 28.2 PG (ref 27–33)
MCHC RBC AUTO-ENTMCNC: 31.8 G/DL (ref 32–36.5)
MCV RBC AUTO: 88.9 FL (ref 80–96)
PLATELET # BLD AUTO: 159 10^3/UL (ref 150–450)
POTASSIUM SERPL-SCNC: 3.8 MEQ/L (ref 3.5–5.1)
PROTHROMBIN TIME: 28 SECONDS (ref 11.8–14)
RBC # BLD AUTO: 3.86 10^6/UL (ref 4–5.4)
SODIUM SERPL-SCNC: 137 MEQ/L (ref 136–145)
WBC # BLD AUTO: 11.1 10^3/UL (ref 4–10)

## 2019-10-24 RX ADMIN — SODIUM CHLORIDE, PRESERVATIVE FREE SCH ML: 5 INJECTION INTRAVENOUS at 14:00

## 2019-10-24 RX ADMIN — WARFARIN SODIUM SCH MG: 3 TABLET ORAL at 16:29

## 2019-10-24 RX ADMIN — ALBUTEROL SULFATE SCH MG: 2.5 SOLUTION RESPIRATORY (INHALATION) at 11:07

## 2019-10-24 RX ADMIN — ALBUTEROL SULFATE SCH MG: 2.5 SOLUTION RESPIRATORY (INHALATION) at 23:33

## 2019-10-24 RX ADMIN — TORSEMIDE SCH MG: 20 TABLET ORAL at 09:10

## 2019-10-24 RX ADMIN — ALBUTEROL SULFATE SCH MG: 2.5 SOLUTION RESPIRATORY (INHALATION) at 20:00

## 2019-10-24 RX ADMIN — SODIUM CHLORIDE, PRESERVATIVE FREE SCH ML: 5 INJECTION INTRAVENOUS at 22:00

## 2019-10-24 RX ADMIN — OMEPRAZOLE SCH MG: 20 CAPSULE, DELAYED RELEASE ORAL at 09:10

## 2019-10-24 RX ADMIN — NYSTATIN SCH DOSE: 100000 CREAM TOPICAL at 21:58

## 2019-10-24 RX ADMIN — LEVOTHYROXINE SODIUM SCH MCG: 25 TABLET ORAL at 05:52

## 2019-10-24 RX ADMIN — NYSTATIN SCH DOSE: 100000 CREAM TOPICAL at 09:11

## 2019-10-24 RX ADMIN — POTASSIUM CHLORIDE SCH MEQ: 750 TABLET, EXTENDED RELEASE ORAL at 21:58

## 2019-10-24 RX ADMIN — ALBUTEROL SULFATE SCH MG: 2.5 SOLUTION RESPIRATORY (INHALATION) at 15:12

## 2019-10-24 RX ADMIN — POTASSIUM CHLORIDE SCH MEQ: 750 TABLET, EXTENDED RELEASE ORAL at 09:10

## 2019-10-24 RX ADMIN — CEFEPIME HYDROCHLORIDE SCH MLS/HR: 2 INJECTION, POWDER, FOR SOLUTION INTRAVENOUS at 21:59

## 2019-10-24 RX ADMIN — CEFEPIME HYDROCHLORIDE SCH MLS/HR: 2 INJECTION, POWDER, FOR SOLUTION INTRAVENOUS at 09:10

## 2019-10-24 RX ADMIN — ALBUTEROL SULFATE SCH MG: 2.5 SOLUTION RESPIRATORY (INHALATION) at 07:15

## 2019-10-24 RX ADMIN — GUAIFENESIN AND DEXTROMETHORPHAN PRN ML: 100; 10 SYRUP ORAL at 00:25

## 2019-10-24 RX ADMIN — ALBUTEROL SULFATE SCH MG: 2.5 SOLUTION RESPIRATORY (INHALATION) at 02:30

## 2019-10-24 RX ADMIN — SODIUM CHLORIDE, PRESERVATIVE FREE SCH ML: 5 INJECTION INTRAVENOUS at 05:53

## 2019-10-24 NOTE — IPNPDOC
Date Seen


The patient was seen on 10/24/19.





Progress Note


SUBJECTIVE: 


Patient reports not feeling so well, cough persistent.  


WBC 16->11.


Blood culture + Pseudomonas x 2. 





OBJECTIVE


General: No acute distress,  Alert, morbidly obese BMI 42 with limited mobility


Eyes: Normal sclera, EOMI, BEBE


HENT: Atraumatic, neck supple, moist mucous membranes


Cardiovascular: Normal rate. 


Pulmonary: Minimal crackles in b/l bases. No wheezing noted. Difficult to 

appreciate breath sounds given body habitus. 


GI: Soft, nontender, obese


Skin: Warm and dry. b/l LE with extensive skin erythema and chronic venous 

stasis changes with overlying dressing, no open wound noted underneath. 


Neuro: CN grossly intact. Generalized weakness worse in LE. 


Psych: oriented x 3





IMAGING:


CXR-


IMPRESSION:


Cardiomegaly. Chronic lung findings as above with no acute infiltrate.





CT chest Angio-


IMPRESSION: 


1. There has been little change from 3/24/2019. No acute interval pulmonary 


embolism is identified. 


2. Minimal bilateral lower lobe infiltrates or atelectasis, increased since the 


prior study. 


3. 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 


4. Cardiomegaly with TAVR and pacemaker. 





MICROBIOLOGY: Please see below. 





ASSESSMENT AND PLAN: 


1. Sepsis


- 2/2 UTI and Pseudomonas bacteremia  


- Suspect LE wound although it is primarily erythematous but no active open 

wound noted at this time. 


- Initial presentation with hypotension, lactic acidosis 3.1->5.4, and 

leukocytosis WBC 19.1.


- No evidence of PNA but developed dysuria that had improved. 


- Blood culture + Pseudomonas x2. Urine culture + strep


- c/w cefepime. ID consulted. 


- Repeat ECHO noted. Patient with severe pulmonary HTN and R. sided heart 

failure. 


2. HTN hx? 


- No medications seen.  


- No medications needed at this time. 


3. PAD


- Follows with vascular as outpatient. 


- resume home meds. 


4. Chronic venous insufficiency with LE wounds


- Wound care consult. 


- Reportedly at baseline and has been treated for infection previously. 


- On empiric abx at this time. 


5. Hypothyroidism


- resume synthroid


6. Afib


- resume warfarin. monitor INR. 


7. s/p AVR


- continue with warfarin. Monitor INR. 


- Goal 2-3


8. LARRY with bipap noncompliant





Code status: Full code





VS, I&O, 24H, Fishbone


Vital Signs/I&O





Vital Signs








  Date Time  Temp Pulse Resp B/P (MAP) Pulse Ox O2 Delivery O2 Flow Rate FiO2


 


10/24/19 16:00 98.6 69 18 140/65 (90) 96 Room Air  


 


10/23/19 20:00       1.0 














I&O- Last 24 Hours up to 6 AM 


 


 10/24/19





 06:00


 


Intake Total 810 ml


 


Output Total 900 ml


 


Balance -90 ml











Laboratory Data


24H LABS


Laboratory Tests 2


10/24/19 05:10: 


Nucleated Red Blood Cells % (auto) 0.0, Prothrombin Time 28.0H, Prothromb Time 

International Ratio 2.63, Anion Gap 7L, Glomerular Filtration Rate 36.6, Calcium

Level 7.7L


CBC/BMP


Laboratory Tests


10/24/19 05:10








Microbiology





Microbiology


10/21/19 Urine Culture - Final, Complete


           Streptococcus Sanguinis


10/21/19 Blood Culture - Final, Complete


           Pseudomonas Aeruginosa


10/21/19 Respiratory Virus Panel (PCR) ([facility]) - Final, Complete


           


10/21/19 Blood Culture - Final, Complete


           Pseudomonas Aeruginosa











LIZETTE TRIVEDI MD                Oct 24, 2019 17:17

## 2019-10-24 NOTE — CR
DATE OF CONSULTATION:  10/24/2019

 

INFECTIOUS DISEASE CONSULTATION

 

Asked to consult by hospitalist service for evaluation of Pseudomonas

bacteremia.

 

HISTORY OF PRESENT ILLNESS:  Mrs. Pisano is an 84-year-old female who is

non-ambulatory with morbid obesity and bilateral lower extremity massive

lymphedema and peripheral vascular disease.  The patient goes to the wound clinic

weekly and gets debridement of her venous ulcers and compression at Dr. Stillerman's office, and she has [facility] who does her dressing the other 2

days of the week.  She lives alone, and is for the most part wheelchair

dependent.  She presented to the emergency room with complaints of generalized

weakness.  She had a low grade temperature of 100.9 and leukocytosis.  The

patient had two sets of blood cultures which were positive for Pseudomonas.

Urine culture had a different pathogen Aeromonas.  The patient did complain of

dysuria and back pain.  The last office visit at the wound clinic, she did not

have debridement of her wounds as they were not open.  She does have dry skin.

She also follows up with Dr. Nathan and her appointment was on 10/28/2019.

 

Her past medical history is significant for chronic sinusitis, asthma, chronic

obstructive pulmonary disease (COPD), sleep apnea, coronary artery disease,

atrial fibrillation, congestive heart failure, right-sided, with pulmonary

hypertension, deep vein thrombosis (DVT), hypertension, type 2 diabetes, diabetic

neuropathy and osteoarthritis.

 

ALLERGIES:  DYNABAC, VIBRAMYCIN, CEFTIN, GRASS, TREES, SAND.

 

SURGICAL HISTORY:  Appendectomy in 1955, cholecystectomy in , dilation and

curettage (D and C) , aortic valve replacement in  and pacemaker in

2016.

 

FAMILY HISTORY:  Father  of malignancy.

 

SOCIAL HISTORY:  She lives alone.  She has a sister who is her healthcare proxy.

She does not smoke or drink.

 

REVIEW OF SYSTEMS:  She had no chest pain or palpitations, but she has chronic

shortness of breath and cough.  She has no nausea, vomiting or diarrhea.  She

denied any headache.  She has a cough and shortness of breath which were at

baseline.

 

LABORATORY DATA:  On admission, her white count was 28.1, today it was 11.1,

hemoglobin 10.9, hematocrit 34.3, platelets 159.  Sodium 137, potassium 3.8,

chloride 105, bicarbonate 25, BUN 31, creatinine 1.45, glucose 177, calcium 7.7,

lactic acid was 3.4.  Blood cultures on 10/21/2019, two sets  by an hour

and half, were both positive for Pseudomonas aeruginosa sensitive to

levofloxacin.  Respiratory panel was negative and urine culture had Streptococcus

sanguinis.  Urinalysis only had 8 white cells and 4 red cells.  Vancomycin trough

is 10.5 on 10/23/2019.  Methicillin-resistant Staphylococcus aureus (MRSA) screen

was not detected.

 

MEDICATIONS:   Albuterol nebs, dextromethorphan 10 mL every 6 hours as needed,

warfarin 3 mg daily, Tessalon 200 mg by mouth twice a day as needed, cefepime 2

grams IV every 12 hours, vancomycin 1.5 grams IV every 18 hours, Tylenol as

needed, Nystatin to groin twice a day, omeprazole 20 mg daily, potassium 20 mEq

by mouth twice a day, Demadex 20 mg by mouth daily, Senokot one tablet by mouth

twice a day as needed, and levothyroxine 25 mcg daily.

 

On physical exam, morbidly obese female in no acute distress, alert and oriented

times three, is a good historian. Maximum temperature (T max) on 10/21/2019 was

100.9, but she has been afebrile since then.  Temperature is 98.6, pulse 69,

respirations 18, blood pressure 140/65, oxygen saturation (O2 sat) 96% on room

air.  Heart:  Normal S1, S2.  Systolic ejection murmur 2/6.  Lungs:  Fair

expiratory wheezes bilaterally and mild crackles at bases.  Abdomen:  Morbidly

obese, soft, nontender.  Groin area has bilateral skin maceration with erythema.

Extremities with extensive edema, warm, +2 pitting, chronic venous stasis changes

but no open ulcers.  There is a dry scab on the right lateral calf.  Both legs

are warm to touch.  Neurologic:  Exam intact.  The patient is able to move both

lower extremities, and she is alert, oriented.

 

CT chest showed no acute pulmonary embolism.  Minimal bilateral lower lobe

infiltrates or atelectasis, and old granulomatous disease of the spleen.

Cardiomegaly with aortic valve replacement and pacemaker.

 

IMPRESSION:  This is a morbidly obese female with bilateral lower extremity

venous stasis changes who follows up at the wound clinic for venous ulcers.

Currently there is no open ulceration.  She presented with Pseudomonas bacteremia

with some nonspecific urinary symptoms.  She stated she had dysuria and low back

pain, but urinalysis was not consistent with an infection and urine culture had a

different pathogen.  The patient has received IV vancomycin and cefepime with

improvement.  White count is down to 11.  The patient feels better and she is

afebrile.  The source of Pseudomonas is not clear to me.  I am not sure this

urinary origin versus cellulitis of lower extremities.  Does not seem to be

pneumonia or pulmonary infection.  She does not have a significant cough.

 

PLAN:  Discontinue IV vancomycin, continue cefepime for now.  Would switch her to

oral Levaquin tomorrow if she continues to do well, 500 mg daily to finish a

10-day course.  Echocardiogram was reviewed, which showed severe right-sided

heart failure.  No pericardial effusion.  Pacemaker noted.  Severe mitral

calcification with moderate left ventricular inflow tract obstruction and mild

mitral insufficiency.  Will obtain CT of the abdomen pelvis to rule out occult

abscess as the cause of infection or any renal pathology before we de-escalate

her therapy to oral Levaquin.  Obtain CBC, CRP, ESR tomorrow.  Pseudomonas

bacteremia is not a typical pathogen to cause endocarditis and therefore, this

would be very unlikely.

## 2019-10-25 VITALS — DIASTOLIC BLOOD PRESSURE: 52 MMHG | SYSTOLIC BLOOD PRESSURE: 105 MMHG

## 2019-10-25 VITALS — SYSTOLIC BLOOD PRESSURE: 136 MMHG | DIASTOLIC BLOOD PRESSURE: 68 MMHG

## 2019-10-25 VITALS — SYSTOLIC BLOOD PRESSURE: 133 MMHG | DIASTOLIC BLOOD PRESSURE: 52 MMHG

## 2019-10-25 VITALS — SYSTOLIC BLOOD PRESSURE: 102 MMHG | DIASTOLIC BLOOD PRESSURE: 46 MMHG

## 2019-10-25 VITALS — SYSTOLIC BLOOD PRESSURE: 105 MMHG | DIASTOLIC BLOOD PRESSURE: 53 MMHG

## 2019-10-25 LAB
BUN SERPL-MCNC: 28 MG/DL (ref 7–18)
CALCIUM SERPL-MCNC: 7.6 MG/DL (ref 8.8–10.2)
CHLORIDE SERPL-SCNC: 108 MEQ/L (ref 98–107)
CO2 SERPL-SCNC: 25 MEQ/L (ref 21–32)
CREAT SERPL-MCNC: 1.24 MG/DL (ref 0.55–1.3)
CRP SERPL-MCNC: 6.2 MG/DL (ref 0–0.3)
GFR SERPL CREATININE-BSD FRML MDRD: 43.9 ML/MIN/{1.73_M2} (ref 32–?)
GLUCOSE SERPL-MCNC: 160 MG/DL (ref 70–100)
HCT VFR BLD AUTO: 35.5 % (ref 36–47)
HGB BLD-MCNC: 11.5 G/DL (ref 12–15.5)
INR PPP: 3
MCH RBC QN AUTO: 29.5 PG (ref 27–33)
MCHC RBC AUTO-ENTMCNC: 32.4 G/DL (ref 32–36.5)
MCV RBC AUTO: 91 FL (ref 80–96)
PLATELET # BLD AUTO: 145 10^3/UL (ref 150–450)
POTASSIUM SERPL-SCNC: 3.4 MEQ/L (ref 3.5–5.1)
PROTHROMBIN TIME: 31 SECONDS (ref 11.8–14)
RBC # BLD AUTO: 3.9 10^6/UL (ref 4–5.4)
SODIUM SERPL-SCNC: 139 MEQ/L (ref 136–145)
WBC # BLD AUTO: 9 10^3/UL (ref 4–10)

## 2019-10-25 RX ADMIN — ALBUTEROL SULFATE SCH MG: 2.5 SOLUTION RESPIRATORY (INHALATION) at 03:53

## 2019-10-25 RX ADMIN — NYSTATIN SCH DOSE: 100000 CREAM TOPICAL at 09:36

## 2019-10-25 RX ADMIN — ALBUTEROL SULFATE SCH MG: 2.5 SOLUTION RESPIRATORY (INHALATION) at 20:59

## 2019-10-25 RX ADMIN — BENZONATATE PRN MG: 100 CAPSULE ORAL at 21:22

## 2019-10-25 RX ADMIN — ACETAMINOPHEN PRN MG: 325 TABLET ORAL at 02:44

## 2019-10-25 RX ADMIN — Medication SCH DOSE: at 09:36

## 2019-10-25 RX ADMIN — ALBUTEROL SULFATE SCH MG: 2.5 SOLUTION RESPIRATORY (INHALATION) at 11:20

## 2019-10-25 RX ADMIN — POTASSIUM CHLORIDE SCH MEQ: 750 TABLET, EXTENDED RELEASE ORAL at 09:35

## 2019-10-25 RX ADMIN — SODIUM CHLORIDE, PRESERVATIVE FREE SCH ML: 5 INJECTION INTRAVENOUS at 14:17

## 2019-10-25 RX ADMIN — SODIUM CHLORIDE, PRESERVATIVE FREE SCH ML: 5 INJECTION INTRAVENOUS at 06:28

## 2019-10-25 RX ADMIN — CEFEPIME HYDROCHLORIDE SCH MLS/HR: 2 INJECTION, POWDER, FOR SOLUTION INTRAVENOUS at 09:35

## 2019-10-25 RX ADMIN — TORSEMIDE SCH MG: 20 TABLET ORAL at 09:33

## 2019-10-25 RX ADMIN — DIATRIZOATE MEGLUMINE AND DIATRIZOATE SODIUM SCH ML: 600; 100 SOLUTION ORAL; RECTAL at 06:28

## 2019-10-25 RX ADMIN — POTASSIUM CHLORIDE SCH MEQ: 750 TABLET, EXTENDED RELEASE ORAL at 21:22

## 2019-10-25 RX ADMIN — ALBUTEROL SULFATE SCH MG: 2.5 SOLUTION RESPIRATORY (INHALATION) at 08:00

## 2019-10-25 RX ADMIN — LEVOTHYROXINE SODIUM SCH MCG: 25 TABLET ORAL at 06:27

## 2019-10-25 RX ADMIN — NYSTATIN SCH DOSE: 100000 CREAM TOPICAL at 22:00

## 2019-10-25 RX ADMIN — OMEPRAZOLE SCH MG: 20 CAPSULE, DELAYED RELEASE ORAL at 09:35

## 2019-10-25 RX ADMIN — GUAIFENESIN AND DEXTROMETHORPHAN PRN ML: 100; 10 SYRUP ORAL at 21:21

## 2019-10-25 RX ADMIN — WARFARIN SODIUM SCH MG: 3 TABLET ORAL at 17:23

## 2019-10-25 RX ADMIN — DIATRIZOATE MEGLUMINE AND DIATRIZOATE SODIUM SCH ML: 600; 100 SOLUTION ORAL; RECTAL at 06:27

## 2019-10-25 RX ADMIN — SODIUM CHLORIDE, PRESERVATIVE FREE SCH ML: 5 INJECTION INTRAVENOUS at 21:25

## 2019-10-25 NOTE — IPN
DATE:  10/25/2019

 

Mrs. Pisano is doing great.  She is sitting in the chair.  Just complains of

dependent edema and heaviness in her legs when sitting with her legs dependent.

She has been afebrile.  She has no nausea, vomiting or diarrhea.  No urinary

symptoms or flank pain.

 

PHYSICAL EXAMINATION:

Temperature is 98.3, pulse 72, respirations 18, blood pressure 105/53, O2 sat 
98%

on room air.

Heart:  Normal S1, S2.  Systolic ejection murmur 2/6.

Lungs are clear.  No wheezes, rales or rhonchi.

Abdomen:  Morbidly obese, soft, nontender.

Extremities:  +2 pitting edema bilaterally.  No open ulcerations.  Dry skin.

 

LABORATORY DATA:

White count is 9, hemoglobin 11.5, hematocrit 35.5, and platelets 145.  ESR 43.

Sodium 139, potassium 3.4, chloride 108, bicarb 25, BUN 28, creatinine 1.24,

glucose 160, calcium 7.6, CRP 6.2.  Blood cultures are positive for Pseudomonas

aeruginosa.  Urine culture Strep sanguinis.  CT abdomen and pelvis was done this

morning and still pending.

 

IMPRESSION:

1.  Pseudomonas aeruginosa bacteremia, possibly of urinary origin versus

cellulitis of lower extremities.  CT of abdomen and pelvis has been ordered and

is still pending.  The patient is on IV cefepime day #5.  That will be

discontinued and she will be switched to Levaquin.

2.  Dependent edema with venous ulcers.  The patient needs to have compression

stockings or Tubigrips.

 

PLAN:

Discontinue cefepime switch to Levaquin 500mg today then 250 mg daily to 

Finish 10 day course

MTDD

## 2019-10-25 NOTE — IPNPDOC
Date Seen


The patient was seen on 10/25/19.





Progress Note


SUBJECTIVE: 


Patient reports not feeling so well, cough persistent.  


WBC 9, afebrile. 





OBJECTIVE


General: No acute distress,  Alert, morbidly obese BMI 42 with limited mobility


Eyes: Normal sclera, EOMI, BEBE


HENT: Atraumatic, neck supple, moist mucous membranes


Cardiovascular: Normal rate. 


Pulmonary: Minimal crackles in b/l bases. No wheezing noted. Difficult to 

appreciate breath sounds given body habitus. 


GI: Soft, nontender, obese


Skin: Warm and dry. b/l LE with extensive skin erythema and chronic venous 

stasis changes with overlying dressing, no open wound noted underneath. 


Neuro: CN grossly intact. Generalized weakness worse in LE. 


Psych: oriented x 3





IMAGING:


CXR-


IMPRESSION:


Cardiomegaly. Chronic lung findings as above with no acute infiltrate.





CT chest Angio-


IMPRESSION: 


1. There has been little change from 3/24/2019. No acute interval pulmonary 


embolism is identified. 


2. Minimal bilateral lower lobe infiltrates or atelectasis, increased since the 


prior study. 


3. 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 


4. Cardiomegaly with TAVR and pacemaker. 





MICROBIOLOGY: Please see below. 





ASSESSMENT AND PLAN: 


1. Sepsis


- 2/2 UTI and Pseudomonas bacteremia  


- Suspect LE wound although it is primarily erythematous but no active open 

wound noted at this time. 


- Initial presentation with hypotension, lactic acidosis 3.1->5.4, and 

leukocytosis WBC 19.1.


- Blood culture + Pseudomonas x2. Urine culture + strep


- c/w cefepime. ID following. 


- Repeat ECHO noted. Patient with severe pulmonary HTN and R. sided heart 

failure, no obvious vegetation noted in report.


-  f/u Abd/Pelvis CT for occult abscess/source of infection. 


2. HTN hx? 


- No medications seen.  


- No medications needed at this time. 


3. PAD


- Follows with vascular as outpatient. 


- resume home meds. 


4. Chronic venous insufficiency with LE wounds


- Wound care consult. 


- Reportedly at baseline and has been treated for infection previously. 


- On empiric abx at this time. 


5. Hypothyroidism


- resume synthroid


6. Afib


- resume warfarin. monitor INR. 


7. s/p AVR


- continue with warfarin. Monitor INR. 


- Goal 2-3


8. LARRY with bipap noncompliant





Code status: Full code





VS, I&O, 24H, Fishbone


Vital Signs/I&O





Vital Signs








  Date Time  Temp Pulse Resp B/P (MAP) Pulse Ox O2 Delivery O2 Flow Rate FiO2


 


10/25/19 12:00 98.3 72 18 105/53 (70) 98 Room Air  


 


10/23/19 20:00       1.0 














I&O- Last 24 Hours up to 6 AM 


 


 10/25/19





 06:00


 


Intake Total 330 ml


 


Output Total 2630 ml


 


Balance -2300 ml











Laboratory Data


24H LABS


Laboratory Tests 2


10/24/19 21:53: Vancomycin Level Trough 25.4*H


10/25/19 05:00: 


Nucleated Red Blood Cells % (auto) 0.0, Prothrombin Time 31.0H, Prothromb Time 

International Ratio 3.00, Anion Gap 6L, Glomerular Filtration Rate 43.9, Calcium

Level 7.6L, C-Reactive Protein, Quantitative 6.20H


10/25/19 05:46: Erythrocyte Sedimentation Rate 43H


CBC/BMP


Laboratory Tests


10/25/19 05:00








Microbiology





Microbiology


10/25/19 Blood Culture, Received


           Pending


10/21/19 Urine Culture - Final, Complete


           Streptococcus Sanguinis


10/21/19 Blood Culture - Final, Complete


           Pseudomonas Aeruginosa


10/21/19 Respiratory Virus Panel (PCR) ([facility]) - Final, Complete


           


10/21/19 Blood Culture - Final, Complete


           Pseudomonas Aeruginosa











LIZETTE TRIVEDI MD                Oct 25, 2019 12:32

## 2019-10-25 NOTE — REP
HISTORY: Pseudomonas infection.

 

COMPARISON: None.

 

CONTRAST: 100 mL Isovue-[id number].

 

The precontrast enhanced portion of the examination shows calcifications

throughout the spleen consistent with granulomatous changes. There is a slight

micronodular surface to the hepatic parenchyma with an abnormal caudate to left

lobe ratio. There are no nephroliths.

 

In the superior pole of the right kidney there is a round 2 cm sized focal area

of low density consistent with a cyst. Tiny renal cortical cysts are seen

bilaterally. There are no enhancing hepatic lesions. The spleen and adrenal

glands are unremarkable. There is fatty infiltration of the pancreas which is

atrophic. There is no abnormal peripancreatic fluid or adenopathy. There is mild

periaortic adenopathy. There is calcific atherosclerotic change seen in the

abdominal aorta. There is significant spray artifact arising from heavy density

barium within the colon due to ingestion of heavy density barium during a

previous barium examination of 10/24/2019. This causes significant artifact.

Small amounts of air density free within the intraperitoneal cavity cannot be

ruled out secondary to this. There is no gross free air or free fluid. There is

no evidence of intestinal obstruction. There is no evidence of abnormal bowel

wall thickening.

 

CT PELVIS:

 

There is no evidence of free fluid or free air. There is no mass or adenopathy.

The pelvic bowel loops are within normal limits. There is contrast and stool

contamination in the gluteal cleft.

 

Bone window technique throughout the exam shows the bones to be demineralized

with advanced degenerative changes.

 

The lung bases show chronic changes without evidence of a pleural or pericardial

effusion. There is four chamber cardiac enlargement.

 

IMPRESSION:

 

1. Evidence of cirrhotic features to the liver. Correlate clinically.

 

2. Evidence of pancreatic atrophy and fatty infiltration.

 

3. There is periaortic adenopathy, etiology uncertain.

 

4. Bilateral renal cysts.

 

5. Exam limitations as described above.

 

6. Other findings as described above.

 

 

Electronically Signed by

Subhash Mcclelland DO 10/25/2019 04:46 P

## 2019-10-26 VITALS — DIASTOLIC BLOOD PRESSURE: 55 MMHG | SYSTOLIC BLOOD PRESSURE: 124 MMHG

## 2019-10-26 VITALS — SYSTOLIC BLOOD PRESSURE: 132 MMHG | DIASTOLIC BLOOD PRESSURE: 54 MMHG

## 2019-10-26 VITALS — SYSTOLIC BLOOD PRESSURE: 129 MMHG | DIASTOLIC BLOOD PRESSURE: 55 MMHG

## 2019-10-26 VITALS — DIASTOLIC BLOOD PRESSURE: 54 MMHG | SYSTOLIC BLOOD PRESSURE: 129 MMHG

## 2019-10-26 LAB
BUN SERPL-MCNC: 22 MG/DL (ref 7–18)
CALCIUM SERPL-MCNC: 7.8 MG/DL (ref 8.8–10.2)
CHLORIDE SERPL-SCNC: 105 MEQ/L (ref 98–107)
CO2 SERPL-SCNC: 32 MEQ/L (ref 21–32)
CREAT SERPL-MCNC: 1.13 MG/DL (ref 0.55–1.3)
GFR SERPL CREATININE-BSD FRML MDRD: 48.8 ML/MIN/{1.73_M2} (ref 32–?)
GLUCOSE SERPL-MCNC: 116 MG/DL (ref 70–100)
HCT VFR BLD AUTO: 38.2 % (ref 36–47)
HGB BLD-MCNC: 12 G/DL (ref 12–15.5)
INR PPP: 3.18
MCH RBC QN AUTO: 28.6 PG (ref 27–33)
MCHC RBC AUTO-ENTMCNC: 31.4 G/DL (ref 32–36.5)
MCV RBC AUTO: 91 FL (ref 80–96)
PLATELET # BLD AUTO: 173 10^3/UL (ref 150–450)
POTASSIUM SERPL-SCNC: 3.1 MEQ/L (ref 3.5–5.1)
PROTHROMBIN TIME: 32.6 SECONDS (ref 11.8–14)
RBC # BLD AUTO: 4.2 10^6/UL (ref 4–5.4)
SODIUM SERPL-SCNC: 143 MEQ/L (ref 136–145)
WBC # BLD AUTO: 10.3 10^3/UL (ref 4–10)

## 2019-10-26 RX ADMIN — NYSTATIN SCH DOSE: 100000 CREAM TOPICAL at 09:00

## 2019-10-26 RX ADMIN — POTASSIUM CHLORIDE SCH MEQ: 750 TABLET, EXTENDED RELEASE ORAL at 09:00

## 2019-10-26 RX ADMIN — ALBUTEROL SULFATE SCH MG: 2.5 SOLUTION RESPIRATORY (INHALATION) at 02:55

## 2019-10-26 RX ADMIN — ALBUTEROL SULFATE SCH MG: 2.5 SOLUTION RESPIRATORY (INHALATION) at 12:20

## 2019-10-26 RX ADMIN — ALBUTEROL SULFATE SCH MG: 2.5 SOLUTION RESPIRATORY (INHALATION) at 08:05

## 2019-10-26 RX ADMIN — SODIUM CHLORIDE, PRESERVATIVE FREE SCH ML: 5 INJECTION INTRAVENOUS at 13:02

## 2019-10-26 RX ADMIN — ACETAMINOPHEN PRN MG: 325 TABLET ORAL at 23:10

## 2019-10-26 RX ADMIN — SODIUM CHLORIDE, PRESERVATIVE FREE SCH ML: 5 INJECTION INTRAVENOUS at 05:35

## 2019-10-26 RX ADMIN — NYSTATIN SCH DOSE: 100000 CREAM TOPICAL at 21:07

## 2019-10-26 RX ADMIN — Medication SCH DOSE: at 09:01

## 2019-10-26 RX ADMIN — OMEPRAZOLE SCH MG: 20 CAPSULE, DELAYED RELEASE ORAL at 09:00

## 2019-10-26 RX ADMIN — GUAIFENESIN AND DEXTROMETHORPHAN PRN ML: 100; 10 SYRUP ORAL at 05:35

## 2019-10-26 RX ADMIN — LEVOTHYROXINE SODIUM SCH MCG: 25 TABLET ORAL at 05:35

## 2019-10-26 RX ADMIN — ALBUTEROL SULFATE SCH MG: 2.5 SOLUTION RESPIRATORY (INHALATION) at 00:44

## 2019-10-26 RX ADMIN — ALBUTEROL SULFATE SCH MG: 2.5 SOLUTION RESPIRATORY (INHALATION) at 19:17

## 2019-10-26 RX ADMIN — TORSEMIDE SCH MG: 20 TABLET ORAL at 09:00

## 2019-10-26 RX ADMIN — SODIUM CHLORIDE, PRESERVATIVE FREE SCH ML: 5 INJECTION INTRAVENOUS at 21:08

## 2019-10-26 RX ADMIN — ALBUTEROL SULFATE SCH MG: 2.5 SOLUTION RESPIRATORY (INHALATION) at 17:01

## 2019-10-26 RX ADMIN — ALBUTEROL SULFATE SCH MG: 2.5 SOLUTION RESPIRATORY (INHALATION) at 23:59

## 2019-10-26 RX ADMIN — POTASSIUM CHLORIDE SCH MEQ: 750 TABLET, EXTENDED RELEASE ORAL at 21:08

## 2019-10-26 NOTE — REP
Examination Requested: Cookie Swallow

 

Reason For Exam: Dysphasia

 

The procedure was performed by VANCE Randall, under the direct

supervision of Dr. Noonan.

 

The procedure was performed with May Moore from speech pathology present.

 

5 ml aliquots of thin, pudding, mixed fruit, soft food, hard food and pill

consistency barium was administered. No penetration or aspiration was visualized

throughout the course of the exam.

 

The detailed report of this examination will be provided by speech pathology.

 

2.2 minutes of fluoroscopy time was utilized for this procedure.

 

 

Reviewed by

VANCE Aburto 10/24/2019 03:12 P

Electronically Signed by

Rishabh Noonan MD 10/26/2019 05:39 P

## 2019-10-26 NOTE — IPNPDOC
Date Seen


The patient was seen on 10/26/19.





Progress Note


SUBJECTIVE: 


Patient think that her LE might be a bit more swollen today, difficult to tell. 


Afebrile overnight,  WBC 10.3.


Transitioned to PO levaquin. 


INR 3.18, hold today's warfarin. 





OBJECTIVE


General: No acute distress,  Alert, morbidly obese BMI 42 with limited mobility


Eyes: Normal sclera, EOMI, BEBE


HENT: Atraumatic, neck supple, moist mucous membranes


Cardiovascular: Normal rate. 


Pulmonary: Minimal crackles in b/l bases. No wheezing noted. Difficult to 

appreciate breath sounds given body habitus. 


GI: Soft, nontender, obese


Skin: Warm and dry. b/l LE with extensive skin erythema and chronic venous 

stasis changes with overlying dressing, no open wound noted underneath. 


Neuro: CN grossly intact. Generalized weakness worse in LE. 


Psych: oriented x 3





IMAGING:


CXR-


IMPRESSION:


Cardiomegaly. Chronic lung findings as above with no acute infiltrate.





CT chest Angio-


IMPRESSION: 


1. There has been little change from 3/24/2019. No acute interval pulmonary 


embolism is identified. 


2. Minimal bilateral lower lobe infiltrates or atelectasis, increased since the 


prior study. 


3. 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 


4. Cardiomegaly with TAVR and pacemaker. 





MICROBIOLOGY: Please see below. 





ASSESSMENT AND PLAN: 


1. Sepsis


- 2/2 UTI and Pseudomonas bacteremia  


- Suspect LE wound although it is primarily erythematous but no active open 

wound noted at this time. 


- Initial presentation with hypotension, lactic acidosis 3.1->5.4, and 

leukocytosis WBC 19.1.


- Blood culture + Pseudomonas x2. Urine culture + strep


- Cefepime d/c. transitioned to PO levaquin to complete 10 day course.  ID 

following. 


- Repeat ECHO noted. Patient with severe pulmonary HTN and R. sided heart 

failure, no obvious vegetation noted in report.


-  f/u Abd/Pelvis CT for occult abscess/source of infection. 


2. HTN hx? 


- No medications seen.  


- No medications needed at this time. 


3. PAD


- Follows with vascular as outpatient. 


- resume home meds. 


4. Chronic venous insufficiency with LE wounds


- Wound care consult. 


- Reportedly at baseline and has been treated for infection previously. 


- On empiric abx at this time. 


5. Hypothyroidism


- resume synthroid


6. Afib


- resume warfarin. monitor INR. 


7. s/p AVR


- continue with warfarin. Monitor INR. 


- Goal 2-3


8. LARRY with bipap noncompliant





Code status: Full code





VS, I&O, 24H, Fishbone


Vital Signs/I&O





Vital Signs








  Date Time  Temp Pulse Resp B/P (MAP) Pulse Ox O2 Delivery O2 Flow Rate FiO2


 


10/26/19 06:00 97.9 70 20 132/54 (80) 96 Room Air  


 


10/26/19 02:00       2.0 














I&O- Last 24 Hours up to 6 AM 


 


 10/26/19





 05:59


 


Intake Total 1140 ml


 


Output Total 1300 ml


 


Balance -160 ml











Laboratory Data


24H LABS


Laboratory Tests 2


10/25/19 17:12: Bedside Glucose (Misc Panel) 153H


10/26/19 07:38: 


Nucleated Red Blood Cells % (auto) 0.0, Prothrombin Time 32.6H, Prothromb Time 

International Ratio 3.18, Anion Gap 6L, Glomerular Filtration Rate 48.8, Calcium

Level 7.8L


CBC/BMP


Laboratory Tests


10/26/19 07:38








Microbiology





Microbiology


10/25/19 Blood Culture - Preliminary, Resulted


           No growth after 24 hours . All specim...


10/21/19 Urine Culture - Final, Complete


           Streptococcus Sanguinis


10/21/19 Blood Culture - Final, Complete


           Pseudomonas Aeruginosa


10/21/19 Respiratory Virus Panel (PCR) ([facility]) - Final, Complete


           


10/21/19 Blood Culture - Final, Complete


           Pseudomonas Aeruginosa











LIZETTE TRIVEDI MD                Oct 26, 2019 12:49

## 2019-10-27 VITALS — DIASTOLIC BLOOD PRESSURE: 61 MMHG | SYSTOLIC BLOOD PRESSURE: 124 MMHG

## 2019-10-27 VITALS — DIASTOLIC BLOOD PRESSURE: 50 MMHG | SYSTOLIC BLOOD PRESSURE: 130 MMHG

## 2019-10-27 VITALS — SYSTOLIC BLOOD PRESSURE: 130 MMHG | DIASTOLIC BLOOD PRESSURE: 57 MMHG

## 2019-10-27 VITALS — DIASTOLIC BLOOD PRESSURE: 56 MMHG | SYSTOLIC BLOOD PRESSURE: 109 MMHG

## 2019-10-27 VITALS — DIASTOLIC BLOOD PRESSURE: 57 MMHG | SYSTOLIC BLOOD PRESSURE: 111 MMHG

## 2019-10-27 VITALS — DIASTOLIC BLOOD PRESSURE: 52 MMHG | SYSTOLIC BLOOD PRESSURE: 131 MMHG

## 2019-10-27 LAB
BUN SERPL-MCNC: 19 MG/DL (ref 7–18)
CALCIUM SERPL-MCNC: 8.1 MG/DL (ref 8.8–10.2)
CHLORIDE SERPL-SCNC: 105 MEQ/L (ref 98–107)
CO2 SERPL-SCNC: 31 MEQ/L (ref 21–32)
CREAT SERPL-MCNC: 1.12 MG/DL (ref 0.55–1.3)
GFR SERPL CREATININE-BSD FRML MDRD: 49.3 ML/MIN/{1.73_M2} (ref 32–?)
GLUCOSE SERPL-MCNC: 129 MG/DL (ref 70–100)
HCT VFR BLD AUTO: 35.9 % (ref 36–47)
HGB BLD-MCNC: 11.2 G/DL (ref 12–15.5)
INR PPP: 2.41
MCH RBC QN AUTO: 28 PG (ref 27–33)
MCHC RBC AUTO-ENTMCNC: 31.2 G/DL (ref 32–36.5)
MCV RBC AUTO: 89.8 FL (ref 80–96)
PLATELET # BLD AUTO: 196 10^3/UL (ref 150–450)
POTASSIUM SERPL-SCNC: 3.5 MEQ/L (ref 3.5–5.1)
PROTHROMBIN TIME: 26.1 SECONDS (ref 11.8–14)
RBC # BLD AUTO: 4 10^6/UL (ref 4–5.4)
SODIUM SERPL-SCNC: 140 MEQ/L (ref 136–145)
WBC # BLD AUTO: 9.9 10^3/UL (ref 4–10)

## 2019-10-27 RX ADMIN — WARFARIN SODIUM SCH MG: 3 TABLET ORAL at 17:25

## 2019-10-27 RX ADMIN — NYSTATIN SCH DOSE: 100000 CREAM TOPICAL at 08:32

## 2019-10-27 RX ADMIN — SODIUM CHLORIDE, PRESERVATIVE FREE SCH ML: 5 INJECTION INTRAVENOUS at 06:07

## 2019-10-27 RX ADMIN — Medication SCH DOSE: at 08:32

## 2019-10-27 RX ADMIN — LEVOTHYROXINE SODIUM SCH MCG: 25 TABLET ORAL at 06:06

## 2019-10-27 RX ADMIN — SODIUM CHLORIDE, PRESERVATIVE FREE SCH ML: 5 INJECTION INTRAVENOUS at 15:39

## 2019-10-27 RX ADMIN — ACETAMINOPHEN PRN MG: 325 TABLET ORAL at 12:12

## 2019-10-27 RX ADMIN — ALBUTEROL SULFATE SCH MG: 2.5 SOLUTION RESPIRATORY (INHALATION) at 11:51

## 2019-10-27 RX ADMIN — ALBUTEROL SULFATE SCH MG: 2.5 SOLUTION RESPIRATORY (INHALATION) at 16:47

## 2019-10-27 RX ADMIN — GUAIFENESIN AND DEXTROMETHORPHAN PRN ML: 100; 10 SYRUP ORAL at 21:02

## 2019-10-27 RX ADMIN — ALBUTEROL SULFATE SCH MG: 2.5 SOLUTION RESPIRATORY (INHALATION) at 23:31

## 2019-10-27 RX ADMIN — TORSEMIDE SCH MG: 20 TABLET ORAL at 08:30

## 2019-10-27 RX ADMIN — ALBUTEROL SULFATE SCH MG: 2.5 SOLUTION RESPIRATORY (INHALATION) at 04:00

## 2019-10-27 RX ADMIN — POTASSIUM CHLORIDE SCH MEQ: 750 TABLET, EXTENDED RELEASE ORAL at 08:29

## 2019-10-27 RX ADMIN — POTASSIUM CHLORIDE SCH MEQ: 750 TABLET, EXTENDED RELEASE ORAL at 21:03

## 2019-10-27 RX ADMIN — SODIUM CHLORIDE, PRESERVATIVE FREE SCH ML: 5 INJECTION INTRAVENOUS at 21:06

## 2019-10-27 RX ADMIN — ALBUTEROL SULFATE SCH MG: 2.5 SOLUTION RESPIRATORY (INHALATION) at 19:37

## 2019-10-27 RX ADMIN — ALBUTEROL SULFATE SCH MG: 2.5 SOLUTION RESPIRATORY (INHALATION) at 02:07

## 2019-10-27 RX ADMIN — BENZONATATE PRN MG: 100 CAPSULE ORAL at 12:11

## 2019-10-27 RX ADMIN — NYSTATIN SCH DOSE: 100000 CREAM TOPICAL at 21:03

## 2019-10-27 RX ADMIN — OMEPRAZOLE SCH MG: 20 CAPSULE, DELAYED RELEASE ORAL at 08:29

## 2019-10-27 RX ADMIN — ACETAMINOPHEN PRN MG: 325 TABLET ORAL at 23:38

## 2019-10-27 RX ADMIN — BENZONATATE PRN MG: 100 CAPSULE ORAL at 21:02

## 2019-10-27 RX ADMIN — ALBUTEROL SULFATE SCH MG: 2.5 SOLUTION RESPIRATORY (INHALATION) at 08:32

## 2019-10-27 NOTE — IPNPDOC
Date Seen


The patient was seen on 10/27/19.





Progress Note


SUBJECTIVE: 84-year-old female with past medical history of severe pulmonary 

hypertension, A. fib (on Coumadin), diabetes, hypertension, diastolic heart 

failure, aortic valve replacement was admitted for UTI and Pseudomonas 

bacteremia. Patient is currently stable and her bed, without any complaints, TTE

was negative for any vegetation. Patient denies a short breath, chest pain, 

nausea, vomiting. All pain or diarrhea.





10 point review of system was negative except for above





PHYSICAL EXAMINATION:


VITAL SIGNS: Please see below.


GENERAL: No distress, frail


HEENT: Normocephalic, atraumatic, moist mucous membranes


NECK: Supple


CARDIOVASCULAR EXAMINATION: Irregularly irregular, valvular click appreciated


RESPIRATORY EXAMINATION: Diminished in the bases, no wheezing


ABDOMINAL EXAMINATION: Soft, nontender, nondistended, positive bowel sounds


EXTREMITIES: Range of motion intact


SKIN: No rash


NEUROLOGICAL EXAMINATION: Alert and oriented 3, no focal deficits 


PSYCHIATRIC EXAMINATION: Calm and cooperative





LABORATORY DATA, IMAGING STUDIES, MICROBIOLOGY: Please see below.





Echocardiogram: Severe pulmonary hypertension.





DVT prophylaxis ordered?: No





ASSESSMENT AND PLAN: 84-year-old female with past medical history of severe 

pulmonary hypertension, A. fib, diastolic heart failure, aortic valve 

replacement, hypertension, diabetes mellitus is admitted for UTI and Pseudomonas

bacteremia.





PROBLEMS:


1. Bacteremia: With UTI, Pseudomonas, repeat blood cultures negative to date, 

antibiotics downgraded from cefepime to Levaquin, to complete 10 days of total 

antibiotics, ID following. TTE negative for vegetations.





2. A. fib:. Continue Coumadin for anticoagulation, rate is well controlled





3. Severe Pulmonary hypertension: Right ventricular systolic pressure of 90 mmHg

on recent echocardiogram, likely due to long-standing and untreated obstructive 

sleep apnea. Patient reports noncompliance due to poor mask fitting/noise of the

machine, importance of CPAP was explained to the patient along with consequences

of untreated sleep apnea; patient reports no one has ever told her about the 

consequences of untreated sleep apnea in the past, agrees to using CPAP from now

on, will initiate CPAP starting tonight.





Due to prophylaxis: On Coumadin.


GI prophylaxis: Home PPI





VS, I&O, 24H, Fishbone


Vital Signs/I&O





Vital Signs








  Date Time  Temp Pulse Resp B/P (MAP) Pulse Ox O2 Delivery O2 Flow Rate FiO2


 


10/27/19 10:00 97.9 70 19 124/61 (82) 94 Room Air  


 


10/26/19 02:00       2.0 














I&O- Last 24 Hours up to 6 AM 


 


 10/27/19





 06:00


 


Intake Total 1460 ml


 


Balance 1460 ml











Laboratory Data


24H LABS


Laboratory Tests 2


10/27/19 08:07: 


Nucleated Red Blood Cells % (auto) 0.0, Anion Gap 4L, Glomerular Filtration Rate

49.3, Calcium Level 8.1L


CBC/BMP


Laboratory Tests


10/27/19 08:07








Microbiology





Microbiology


10/25/19 Blood Culture - Preliminary, Resulted


           No Growth after 48 hours. All Specime...


10/21/19 Urine Culture - Final, Complete


           Streptococcus Sanguinis


10/21/19 Blood Culture - Final, Complete


           Pseudomonas Aeruginosa


10/21/19 Respiratory Virus Panel (PCR) ([facility]) - Final, Complete


           


10/21/19 Blood Culture - Final, Complete


           Pseudomonas Aeruginosa











GONDAL,KHUBAIB N. MD           Oct 27, 2019 14:17

## 2019-10-28 VITALS — SYSTOLIC BLOOD PRESSURE: 125 MMHG | DIASTOLIC BLOOD PRESSURE: 53 MMHG

## 2019-10-28 VITALS — SYSTOLIC BLOOD PRESSURE: 128 MMHG | DIASTOLIC BLOOD PRESSURE: 55 MMHG

## 2019-10-28 VITALS — SYSTOLIC BLOOD PRESSURE: 131 MMHG | DIASTOLIC BLOOD PRESSURE: 52 MMHG

## 2019-10-28 VITALS — SYSTOLIC BLOOD PRESSURE: 127 MMHG | DIASTOLIC BLOOD PRESSURE: 55 MMHG

## 2019-10-28 VITALS — SYSTOLIC BLOOD PRESSURE: 132 MMHG | DIASTOLIC BLOOD PRESSURE: 54 MMHG

## 2019-10-28 LAB
BUN SERPL-MCNC: 18 MG/DL (ref 7–18)
CALCIUM SERPL-MCNC: 7.7 MG/DL (ref 8.8–10.2)
CHLORIDE SERPL-SCNC: 106 MEQ/L (ref 98–107)
CO2 SERPL-SCNC: 27 MEQ/L (ref 21–32)
CREAT SERPL-MCNC: 1.25 MG/DL (ref 0.55–1.3)
GFR SERPL CREATININE-BSD FRML MDRD: 43.5 ML/MIN/{1.73_M2} (ref 32–?)
GLUCOSE SERPL-MCNC: 118 MG/DL (ref 70–100)
HCT VFR BLD AUTO: 37.3 % (ref 36–47)
HGB BLD-MCNC: 11.6 G/DL (ref 12–15.5)
INR PPP: 2.51
MAGNESIUM SERPL-MCNC: 1.4 MG/DL (ref 1.8–2.4)
MCH RBC QN AUTO: 28.6 PG (ref 27–33)
MCHC RBC AUTO-ENTMCNC: 31.1 G/DL (ref 32–36.5)
MCV RBC AUTO: 91.9 FL (ref 80–96)
PHOSPHATE SERPL-MCNC: 2.1 MG/DL (ref 2.5–4.9)
PLATELET # BLD AUTO: 204 10^3/UL (ref 150–450)
POTASSIUM SERPL-SCNC: 4.3 MEQ/L (ref 3.5–5.1)
PROTHROMBIN TIME: 27 SECONDS (ref 11.8–14)
RBC # BLD AUTO: 4.06 10^6/UL (ref 4–5.4)
SODIUM SERPL-SCNC: 138 MEQ/L (ref 136–145)
WBC # BLD AUTO: 8.7 10^3/UL (ref 4–10)

## 2019-10-28 RX ADMIN — BENZONATATE PRN MG: 100 CAPSULE ORAL at 05:10

## 2019-10-28 RX ADMIN — GUAIFENESIN AND DEXTROMETHORPHAN PRN ML: 100; 10 SYRUP ORAL at 20:05

## 2019-10-28 RX ADMIN — DIBASIC SODIUM PHOSPHATE, MONOBASIC POTASSIUM PHOSPHATE AND MONOBASIC SODIUM PHOSPHATE SCH MG: 852; 155; 130 TABLET ORAL at 15:09

## 2019-10-28 RX ADMIN — MAGNESIUM SULFATE IN DEXTROSE SCH MLS/HR: 10 INJECTION, SOLUTION INTRAVENOUS at 10:34

## 2019-10-28 RX ADMIN — POTASSIUM CHLORIDE SCH MEQ: 750 TABLET, EXTENDED RELEASE ORAL at 08:43

## 2019-10-28 RX ADMIN — GUAIFENESIN AND DEXTROMETHORPHAN PRN ML: 100; 10 SYRUP ORAL at 04:45

## 2019-10-28 RX ADMIN — ALBUTEROL SULFATE SCH MG: 2.5 SOLUTION RESPIRATORY (INHALATION) at 03:17

## 2019-10-28 RX ADMIN — BENZONATATE PRN MG: 100 CAPSULE ORAL at 20:05

## 2019-10-28 RX ADMIN — SODIUM CHLORIDE, PRESERVATIVE FREE SCH ML: 5 INJECTION INTRAVENOUS at 14:00

## 2019-10-28 RX ADMIN — NYSTATIN SCH DOSE: 100000 CREAM TOPICAL at 20:05

## 2019-10-28 RX ADMIN — NYSTATIN SCH DOSE: 100000 CREAM TOPICAL at 08:44

## 2019-10-28 RX ADMIN — GUAIFENESIN AND DEXTROMETHORPHAN PRN ML: 100; 10 SYRUP ORAL at 12:44

## 2019-10-28 RX ADMIN — LEVOTHYROXINE SODIUM SCH MCG: 25 TABLET ORAL at 05:10

## 2019-10-28 RX ADMIN — ACETAMINOPHEN PRN MG: 325 TABLET ORAL at 08:43

## 2019-10-28 RX ADMIN — MAGNESIUM SULFATE IN DEXTROSE SCH MLS/HR: 10 INJECTION, SOLUTION INTRAVENOUS at 08:58

## 2019-10-28 RX ADMIN — ALBUTEROL SULFATE SCH MG: 2.5 SOLUTION RESPIRATORY (INHALATION) at 11:01

## 2019-10-28 RX ADMIN — SODIUM CHLORIDE, PRESERVATIVE FREE SCH ML: 5 INJECTION INTRAVENOUS at 05:11

## 2019-10-28 RX ADMIN — ACETAMINOPHEN PRN MG: 325 TABLET ORAL at 15:41

## 2019-10-28 RX ADMIN — ALBUTEROL SULFATE SCH MG: 2.5 SOLUTION RESPIRATORY (INHALATION) at 07:11

## 2019-10-28 RX ADMIN — TORSEMIDE SCH MG: 20 TABLET ORAL at 08:42

## 2019-10-28 RX ADMIN — DIBASIC SODIUM PHOSPHATE, MONOBASIC POTASSIUM PHOSPHATE AND MONOBASIC SODIUM PHOSPHATE SCH MG: 852; 155; 130 TABLET ORAL at 10:33

## 2019-10-28 RX ADMIN — ALBUTEROL SULFATE SCH MG: 2.5 SOLUTION RESPIRATORY (INHALATION) at 15:05

## 2019-10-28 RX ADMIN — SODIUM CHLORIDE, PRESERVATIVE FREE SCH ML: 5 INJECTION INTRAVENOUS at 20:06

## 2019-10-28 RX ADMIN — OMEPRAZOLE SCH MG: 20 CAPSULE, DELAYED RELEASE ORAL at 08:42

## 2019-10-28 RX ADMIN — ACETAMINOPHEN PRN MG: 325 TABLET ORAL at 23:08

## 2019-10-28 RX ADMIN — MAGNESIUM SULFATE IN DEXTROSE SCH MLS/HR: 10 INJECTION, SOLUTION INTRAVENOUS at 08:59

## 2019-10-28 RX ADMIN — POTASSIUM CHLORIDE SCH MEQ: 750 TABLET, EXTENDED RELEASE ORAL at 20:05

## 2019-10-28 RX ADMIN — Medication SCH DOSE: at 08:59

## 2019-10-28 RX ADMIN — ALBUTEROL SULFATE SCH MG: 2.5 SOLUTION RESPIRATORY (INHALATION) at 23:14

## 2019-10-28 RX ADMIN — ALBUTEROL SULFATE SCH MG: 2.5 SOLUTION RESPIRATORY (INHALATION) at 19:16

## 2019-10-28 RX ADMIN — WARFARIN SODIUM SCH MG: 3 TABLET ORAL at 18:00

## 2019-10-28 NOTE — IPNPDOC
Date Seen


The patient was seen on 10/28/19.





Progress Note


SUBJECTIVE: 84-year-old female with past medical history of severe pulmonary 

hypertension, A. fib (on Coumadin), diabetes, hypertension, diastolic heart 

failure, aortic valve replacement was admitted for UTI and Pseudomonas 

bacteremia. Patient is currently stable and her bed, without any complaints, TTE

was negative for any vegetation. 





10/28/2019


Tolerate his CPAP overnight, reports coughing in the morning, currently in chair

without any complaints, worked with physical therapy, tolerating diet, awaiting 

placement. She denies any short of breath, chest pain, vomiting, abdominal pain,

diarrhea.





10 point review of system was negative except for above





PHYSICAL EXAMINATION:


VITAL SIGNS: Please see below.


GENERAL: No distress, frail


HEENT: Normocephalic, atraumatic, moist mucous membranes


NECK: Supple


CARDIOVASCULAR EXAMINATION: Irregularly irregular, valvular click appreciated


RESPIRATORY EXAMINATION: Diminished in the bases, no wheezing


ABDOMINAL EXAMINATION: Soft, nontender, nondistended, positive bowel sounds


EXTREMITIES: Range of motion intact


SKIN: No rash


NEUROLOGICAL EXAMINATION: Alert and oriented 3, no focal deficits 


PSYCHIATRIC EXAMINATION: Calm and cooperative





LABORATORY DATA, IMAGING STUDIES, MICROBIOLOGY: Please see below.





Echocardiogram: Severe pulmonary hypertension.





DVT prophylaxis ordered?: No





ASSESSMENT AND PLAN: 84-year-old female with past medical history of severe 

pulmonary hypertension, A. fib, diastolic heart failure, aortic valve 

replacement, hypertension, diabetes mellitus is admitted for UTI and Pseudomonas

bacteremia.





PROBLEMS:


1. Bacteremia: With UTI, Pseudomonas, repeat blood cultures negative to date, 

antibiotics downgraded from cefepime to Levaquin, to complete 10 days of total 

antibiotics, ID following. TTE negative for vegetations. Awaiting rehabilitation

placement





2. A. fib:. Continue Coumadin for anticoagulation, rate is well controlled





3. Severe Pulmonary hypertension: Right ventricular systolic pressure of 90 mmHg

on recent echocardiogram, likely due to long-standing and untreated obstructive 

sleep apnea. Patient reports noncompliance due to poor mask fitting/noise of the

machine, importance of CPAP was explained to the patient along with consequences

of untreated sleep apnea; patient reports no one has ever told her about the 

consequences of untreated sleep apnea in the past, agrees to using CPAP from now

on, will initiate CPAP starting tonight.





Due to prophylaxis: On Coumadin.


GI prophylaxis: Home PPI





VS, I&O, 24H, Fishbone


Vital Signs/I&O





Vital Signs








  Date Time  Temp Pulse Resp B/P (MAP) Pulse Ox O2 Delivery O2 Flow Rate FiO2


 


10/28/19 10:00 97.8 70 19 127/55 (79) 97 Room Air  


 


10/26/19 02:00       2.0 














I&O- Last 24 Hours up to 6 AM 


 


 10/28/19





 06:00


 


Intake Total 1230 ml


 


Balance 1230 ml











Laboratory Data


24H LABS


Laboratory Tests 2


10/27/19 16:06: 


Prothrombin Time 26.1H, Prothromb Time International Ratio 2.41


10/28/19 05:56: 


Prothrombin Time 27.0H, Prothromb Time International Ratio 2.51, Nucleated Red 

Blood Cells % (auto) 0.0, Anion Gap 5L, Glomerular Filtration Rate 43.5, Calcium

Level 7.7L, Phosphorus Level 2.1L, Magnesium Level 1.4L


CBC/BMP


Laboratory Tests


10/28/19 05:56








Microbiology





Microbiology


10/25/19 Blood Culture - Preliminary, Resulted


           No Growth after 72 hours. All specime...


10/21/19 Urine Culture - Final, Complete


           Streptococcus Sanguinis


10/21/19 Blood Culture - Final, Complete


           Pseudomonas Aeruginosa


10/21/19 Respiratory Virus Panel (PCR) ([facility]) - Final, Complete


           


10/21/19 Blood Culture - Final, Complete


           Pseudomonas Aeruginosa











GONDAL,KHUBAIB N. MD           Oct 28, 2019 14:11

## 2019-10-28 NOTE — IPN
DATE:  10/28/2019

 

SUBJECTIVE: The patient was seen and examined this afternoon. She currently has

no new complaints. She has continued edema in her bilateral lower legs. She has

remained afebrile. She denies any nausea or vomiting or diarrhea. She denies any

urinary symptoms or flank pain.

 

OBJECTIVE:

VITAL SIGNS: Temperature 97.7, pulse 70, respiratory rate 20, blood pressure

125/53, pulse oximetry 97% on room air.

GENERAL: The patient is awake, alert and oriented. She does not appear in any

acute distress. She is sitting comfortably in her chair watching TV.

HEENT: Normocephalic, atraumatic. Eyes are nonicteric. Trachea is midline. There

is no palpable cervical axillary or supraclavicular adenopathy.

CARDIOVASCULAR: Normal S1, S2. Systolic ejection murmur 2/6 present.

RESPIRATORY: Clear vesicular breath sounds bilaterally with good respiratory

effort. There are no wheezes, rhonchi, or rales.

ABDOMEN: The patient is morbidly obese. Her abdomen is soft, nontender and

nondistended. There is normoactive bowel sounds.

EXTREMITIES: There is significant 2-3+ pitting edema bilaterally with no open

ulcerations.

 

LABORATORY DATA: Hematology: White blood cells 8.7, hemoglobin 11.6, hematocrit

37.3, platelet count 204.

 

Chemistries: Sodium 138, potassium 4.3, chloride 106, CO2 27, BUN 18, creatinine

1.25, glucose 118, Calcium 7.7, phosphorus 2.1, magnesium 1.4.

 

MEDICATIONS:

Vancomycin trough 25.4

 

Venous blood cultures on 10/25 were negative for any growth.

 

IMAGING: Abdominal and pelvis CT completed on 10/25 demonstrated evidence of

cirrhotic features to the liver, evidence of pancreatic atrophy, fatty

infiltration and paraaortic adenopathy, bilateral renal cysts, otherwise 
negative

CT abdomen and pelvis.

 

ASSESSMENT AND PLAN:

1.  Pseudomonas aeruginosa bacteremia, likely of urinary origin. Patient had a

repeat blood culture have remained negative. She

completed five days of IV cefepime and she was recently switched to Levaquin.

 She is currently on day 4/10 of her Levaquin.and will need to complete a full 

10 day course of Levaquin. At this point

infectious disease will be signing off.



2. Dependant edema with venous ulcers. Patient has venous stasis disease. She

will benefit from compression stockings.

 

My faculty preceptor for this patient encounter was physically present during 
the

encounter and was fully available. All aspects of the patient interview,

examination, medical decision making process, and medical care plan development

were reviewed and approved by the faculty preceptor. The faculty preceptor is

aware and concurs with the plan as stated in the body of this note and will

attest to such by his/her cosignature.

ANA ROSA

## 2019-10-29 VITALS — SYSTOLIC BLOOD PRESSURE: 101 MMHG | DIASTOLIC BLOOD PRESSURE: 50 MMHG

## 2019-10-29 VITALS — DIASTOLIC BLOOD PRESSURE: 57 MMHG | SYSTOLIC BLOOD PRESSURE: 128 MMHG

## 2019-10-29 VITALS — SYSTOLIC BLOOD PRESSURE: 102 MMHG | DIASTOLIC BLOOD PRESSURE: 56 MMHG

## 2019-10-29 LAB
BUN SERPL-MCNC: 20 MG/DL (ref 7–18)
CALCIUM SERPL-MCNC: 7.8 MG/DL (ref 8.8–10.2)
CHLORIDE SERPL-SCNC: 102 MEQ/L (ref 98–107)
CO2 SERPL-SCNC: 31 MEQ/L (ref 21–32)
CREAT SERPL-MCNC: 1.15 MG/DL (ref 0.55–1.3)
GFR SERPL CREATININE-BSD FRML MDRD: 47.9 ML/MIN/{1.73_M2} (ref 32–?)
GLUCOSE SERPL-MCNC: 114 MG/DL (ref 70–100)
HCT VFR BLD AUTO: 35.1 % (ref 36–47)
HGB BLD-MCNC: 11.2 G/DL (ref 12–15.5)
INR PPP: 3.24
MAGNESIUM SERPL-MCNC: 1.9 MG/DL (ref 1.8–2.4)
MCH RBC QN AUTO: 28.6 PG (ref 27–33)
MCHC RBC AUTO-ENTMCNC: 31.9 G/DL (ref 32–36.5)
MCV RBC AUTO: 89.5 FL (ref 80–96)
PHOSPHATE SERPL-MCNC: 2.7 MG/DL (ref 2.5–4.9)
PLATELET # BLD AUTO: 209 10^3/UL (ref 150–450)
POTASSIUM SERPL-SCNC: 4 MEQ/L (ref 3.5–5.1)
PROTHROMBIN TIME: 33 SECONDS (ref 11.8–14)
RBC # BLD AUTO: 3.92 10^6/UL (ref 4–5.4)
SODIUM SERPL-SCNC: 140 MEQ/L (ref 136–145)
WBC # BLD AUTO: 10.1 10^3/UL (ref 4–10)

## 2019-10-29 RX ADMIN — POTASSIUM CHLORIDE SCH MEQ: 750 TABLET, EXTENDED RELEASE ORAL at 09:13

## 2019-10-29 RX ADMIN — ALBUTEROL SULFATE SCH MG: 2.5 SOLUTION RESPIRATORY (INHALATION) at 11:03

## 2019-10-29 RX ADMIN — LEVOTHYROXINE SODIUM SCH MCG: 25 TABLET ORAL at 05:53

## 2019-10-29 RX ADMIN — SODIUM CHLORIDE, PRESERVATIVE FREE SCH ML: 5 INJECTION INTRAVENOUS at 14:00

## 2019-10-29 RX ADMIN — OMEPRAZOLE SCH MG: 20 CAPSULE, DELAYED RELEASE ORAL at 09:13

## 2019-10-29 RX ADMIN — TORSEMIDE SCH MG: 20 TABLET ORAL at 09:13

## 2019-10-29 RX ADMIN — NYSTATIN SCH DOSE: 100000 CREAM TOPICAL at 09:13

## 2019-10-29 RX ADMIN — SODIUM CHLORIDE, PRESERVATIVE FREE SCH ML: 5 INJECTION INTRAVENOUS at 05:54

## 2019-10-29 RX ADMIN — ALBUTEROL SULFATE SCH MG: 2.5 SOLUTION RESPIRATORY (INHALATION) at 05:03

## 2019-10-29 RX ADMIN — ALBUTEROL SULFATE SCH MG: 2.5 SOLUTION RESPIRATORY (INHALATION) at 03:37

## 2019-10-29 RX ADMIN — ALBUTEROL SULFATE SCH MG: 2.5 SOLUTION RESPIRATORY (INHALATION) at 15:14

## 2019-10-29 RX ADMIN — BENZONATATE PRN MG: 100 CAPSULE ORAL at 05:53

## 2019-10-29 RX ADMIN — Medication SCH DOSE: at 09:00

## 2019-10-29 RX ADMIN — ACETAMINOPHEN PRN MG: 325 TABLET ORAL at 04:42

## 2019-10-29 NOTE — IPNPDOC
Date Seen


The patient was seen on 10/29/19.





Progress Note


SUBJECTIVE: 84-year-old female with past medical history of severe pulmonary 

hypertension, A. fib (on Coumadin), diabetes, hypertension, diastolic heart 

failure, aortic valve replacement was admitted for UTI and Pseudomonas 

bacteremia. Patient is currently stable and her bed, without any complaints, TTE

was negative for any vegetation. 





10/28/2019


Tolerate his CPAP overnight, reports coughing in the morning, currently in chair

without any complaints, worked with physical therapy, tolerating diet, awaiting 

placement. She denies any short of breath, chest pain, vomiting, abdominal pain,

diarrhea.





10/29/2019


Patient without any complaints at this time, tolerating diet, awaiting placement

.





10 point review of system was negative except for above





PHYSICAL EXAMINATION:


VITAL SIGNS: Please see below.


GENERAL: No distress, frail


HEENT: Normocephalic, atraumatic, moist mucous membranes


NECK: Supple


CARDIOVASCULAR EXAMINATION: Irregularly irregular, valvular click appreciated


RESPIRATORY EXAMINATION: Diminished in the bases, no wheezing


ABDOMINAL EXAMINATION: Soft, nontender, nondistended, positive bowel sounds


EXTREMITIES: Range of motion intact


SKIN: No rash


NEUROLOGICAL EXAMINATION: Alert and oriented 3, no focal deficits 


PSYCHIATRIC EXAMINATION: Calm and cooperative





LABORATORY DATA, IMAGING STUDIES, MICROBIOLOGY: Please see below.





Echocardiogram: Severe pulmonary hypertension.





DVT prophylaxis ordered?: No





ASSESSMENT AND PLAN: 84-year-old female with past medical history of severe 

pulmonary hypertension, A. fib, diastolic heart failure, aortic valve 

replacement, hypertension, diabetes mellitus is admitted for UTI and Pseudomonas

bacteremia.





PROBLEMS:


1. Bacteremia: With UTI, Pseudomonas, repeat blood cultures negative to date, 

antibiotics downgraded from cefepime to Levaquin, to complete 10 days of total 

antibiotics, ID following. TTE negative for vegetations. Awaiting rehabilitation

placement





2. A. fib:. INR supratherapeutic, hold Coumadin for today. Rate is well 

controlled





3. Severe Pulmonary hypertension: Right ventricular systolic pressure of 90 mmHg

on recent echocardiogram





4. Obstructive sleep apnea: Continue CPAP.





Due to prophylaxis: On Coumadin.


GI prophylaxis: Home PPI





VS, I&O, 24H, Fishbone


Vital Signs/I&O





Vital Signs








  Date Time  Temp Pulse Resp B/P (MAP) Pulse Ox O2 Delivery O2 Flow Rate FiO2


 


10/29/19 06:00 98.8 70 18 102/56 (71) 95 Room Air  


 


10/26/19 02:00       2.0 














I&O- Last 24 Hours up to 6 AM 


 


 10/29/19





 06:00


 


Intake Total 1350 ml


 


Output Total 600 ml


 


Balance 750 ml











Laboratory Data


24H LABS


Laboratory Tests 2


10/29/19 06:33: 


Nucleated Red Blood Cells % (auto) 0.0, Prothrombin Time 33.0H, Prothromb Time 

International Ratio 3.24, Anion Gap 7L, Glomerular Filtration Rate 47.9, Calcium

Level 7.8L, Phosphorus Level 2.7#, Magnesium Level 1.9


CBC/BMP


Laboratory Tests


10/29/19 06:33








Microbiology





Microbiology


10/25/19 Blood Culture - Preliminary, Resulted


           No Growth after 72 hours. All specime...


10/21/19 Urine Culture - Final, Complete


           Streptococcus Sanguinis


10/21/19 Blood Culture - Final, Complete


           Pseudomonas Aeruginosa


10/21/19 Respiratory Virus Panel (PCR) ([facility]) - Final, Complete


           


10/21/19 Blood Culture - Final, Complete


           Pseudomonas Aeruginosa











GONDAL,KHUBAIB N. MD           Oct 29, 2019 11:57

## 2019-10-29 NOTE — DS.PDOC
Discharge Summary


General


Date of Admission


Oct 21, 2019 at 07:15


Date of Discharge


10/29/2019


Attending Physician:  GONDAL,KHUBAIB N. MD





Discharge Summary


PROCEDURES PERFORMED DURING STAY: None.





ADMITTING DIAGNOSES: 


1. UTI, bacteremia.





DISCHARGE DIAGNOSES:


1. UTI, bacteremia.





COMPLICATIONS/CHIEF COMPLAINT: CHF.





HISTORY OF PRESENT ILLNESS: 84-year-old female with past medical history of 

pulmonary hypertension, CHF, hypertension, A. fib on Coumadin, was admitted for 

UTI and bacteremia. Cultures grew sensitive Pseudomonas, TTE was negative for 

vegetation. Her antibiotics were switched to Levaquin based on sensitivities and

she is to complete a 10 day course total. She was about physical therapy who 

recommend rehabilitation placement prior to discharge,  has ar

ranged for rehabilitation placement. Patient was also started on CPAP during her

stay, but reports noncompliance the outpatient setting, recommend continued use 

at home..





HOSPITAL COURSE: As above. 





DISCHARGE MEDICATIONS: Please see below.


 


ALLERGIES: Please see below.





PHYSICAL EXAMINATION:


VITAL SIGNS: Please see below.


GENERAL: No distress, frail


HEENT: Normocephalic, atraumatic, moist mucous membranes


NECK: Supple


CARDIOVASCULAR EXAMINATION: Irregularly irregular, valvular click appreciated


RESPIRATORY EXAMINATION: Diminished in the bases, no wheezing


ABDOMINAL EXAMINATION: Soft, nontender, nondistended, positive bowel sounds


EXTREMITIES: Range of motion intact


SKIN: No rash


NEUROLOGICAL EXAMINATION: Alert and oriented 3, no focal deficits 


PSYCHIATRIC EXAMINATION: Calm and cooperative





LABORATORY DATA: Please see below.





PROGNOSIS: Guarded





ACTIVITY: As tolerated.





DIET: Are reactive





DISCHARGE PLAN: Patient is to follow-up with PCP and wants 2 weeks





DISPOSITION: Rehabilitation.





DISCHARGE INSTRUCTIONS:


1. As above.





DISCHARGE CONDITION: Stable.





TIME SPENT ON DISCHARGE: Greater than 35 minutes.





Vital Signs/I&Os





Vital Signs








  Date Time  Temp Pulse Resp B/P (MAP) Pulse Ox O2 Delivery O2 Flow Rate FiO2


 


10/29/19 06:00 98.8 70 18 102/56 (71) 95 Room Air  


 


10/26/19 02:00       2.0 














I&O- Last 24 Hours up to 6 AM 


 


 10/29/19





 05:59


 


Intake Total 1350 ml


 


Output Total 600 ml


 


Balance 750 ml











Laboratory Data


Labs 24H


Laboratory Tests 2


10/29/19 06:33: 


Nucleated Red Blood Cells % (auto) 0.0, Prothrombin Time 33.0H, Prothromb Time 

International Ratio 3.24, Anion Gap 7L, Glomerular Filtration Rate 47.9, Calcium

Level 7.8L, Phosphorus Level 2.7#, Magnesium Level 1.9


CBC/BMP


Laboratory Tests


10/29/19 06:33











Microbiology





Microbiology


10/25/19 Blood Culture - Preliminary, Resulted


           No Growth after 72 hours. All specime...


10/21/19 Urine Culture - Final, Complete


           Streptococcus Sanguinis


10/21/19 Blood Culture - Final, Complete


           Pseudomonas Aeruginosa


10/21/19 Respiratory Virus Panel (PCR) ([facility]) - Final, Complete


           


10/21/19 Blood Culture - Final, Complete


           Pseudomonas Aeruginosa





Discharge Medications


Scheduled


Levofloxacin (Levofloxacin) 250 Mg Tablet, 250 MG PO DAILY@06


Levothyroxine Sodium (Synthroid) 25 Mcg Tab, 25 MCG PO DAILY, (Reported)


Omeprazole (Omeprazole) 20 Mg Cap, 20 MG PO DAILY, (Reported)


Potassium Chloride (Potassium Chloride) 10 Meq Tab, 20 MEQ PO BID, (Reported)


Torsemide (Torsemide) 20 Mg Tablet, 20 MG PO DAILY, (Reported)


Warfarin Sodium (Warfarin Sodium) 3 Mg Tablet, 3 MG PO 2XWK, (Reported)


   TAKES ON WEDNESDAY AND THURSDAY 


Warfarin Sodium (Warfarin Sodium) 6 Mg Tablet, 6 MG PO 5XW


   TAKES ON MONDAY, TUESDAY, FRIDAY, SATURDAY AND SUNDAY. ORDERED AS DAILY, BUT 

PATIENT STATES SHE TAKES THE 6MG 5 DAYS A WEEK 





Scheduled PRN


Acetaminophen (Acetaminophen) 500 Mg Tab, 1,000 MG PO Q6H PRN for PAIN, 

(Reported)


Albuterol Sulf (Albuterol Sulfate) 2.5 Mg/3 Ml Nebu, 1 INH INH Q4H PRN for XENA

RTNESS OF BREATH, (Reported)


Sennosides/Docusate Sodium (Senna Plus Tablet) 1 Tab Tab, 1 TAB PO BID PRN for 

CONSTIPATION, (Reported)





Allergies


Coded Allergies:  


     dirithromycin (Verified  Allergy, Intermediate, hives, 3/27/19)


     Sulfa (Sulfonamide Antibiotics) (Verified  Allergy, Unknown, 3/27/19)


     Penicillins (Verified  Adverse Reaction, Intermediate, tachycardia 40 yrs 

ago, 3/27/19)











GONDAL,KHUBAIB N. MD           Oct 29, 2019 13:02

## 2020-01-22 ENCOUNTER — HOSPITAL ENCOUNTER (OUTPATIENT)
Dept: HOSPITAL 53 - M PT | Age: 85
LOS: 9 days | End: 2020-01-31
Attending: FAMILY MEDICINE
Payer: MEDICARE

## 2020-01-22 DIAGNOSIS — Z47.89: Primary | ICD-10-CM

## 2020-04-15 ENCOUNTER — HOSPITAL ENCOUNTER (INPATIENT)
Dept: HOSPITAL 53 - M RR INP | Age: 85
LOS: 5 days | Discharge: NURSING, REHAB OR LONG-TERM CARE | DRG: 871 | End: 2020-04-20
Attending: INTERNAL MEDICINE | Admitting: GENERAL PRACTICE
Payer: MEDICARE

## 2020-04-15 VITALS — DIASTOLIC BLOOD PRESSURE: 37 MMHG | SYSTOLIC BLOOD PRESSURE: 79 MMHG

## 2020-04-15 VITALS — DIASTOLIC BLOOD PRESSURE: 44 MMHG | SYSTOLIC BLOOD PRESSURE: 82 MMHG

## 2020-04-15 VITALS — DIASTOLIC BLOOD PRESSURE: 36 MMHG | SYSTOLIC BLOOD PRESSURE: 72 MMHG

## 2020-04-15 VITALS — HEIGHT: 64 IN | WEIGHT: 283.96 LBS | BODY MASS INDEX: 48.48 KG/M2

## 2020-04-15 DIAGNOSIS — I48.20: ICD-10-CM

## 2020-04-15 DIAGNOSIS — K21.9: ICD-10-CM

## 2020-04-15 DIAGNOSIS — E78.5: ICD-10-CM

## 2020-04-15 DIAGNOSIS — R65.21: ICD-10-CM

## 2020-04-15 DIAGNOSIS — N39.0: ICD-10-CM

## 2020-04-15 DIAGNOSIS — J18.9: ICD-10-CM

## 2020-04-15 DIAGNOSIS — Z79.899: ICD-10-CM

## 2020-04-15 DIAGNOSIS — N18.3: ICD-10-CM

## 2020-04-15 DIAGNOSIS — Z88.8: ICD-10-CM

## 2020-04-15 DIAGNOSIS — Z91.19: ICD-10-CM

## 2020-04-15 DIAGNOSIS — R57.0: ICD-10-CM

## 2020-04-15 DIAGNOSIS — I50.33: ICD-10-CM

## 2020-04-15 DIAGNOSIS — Z51.5: ICD-10-CM

## 2020-04-15 DIAGNOSIS — G47.33: ICD-10-CM

## 2020-04-15 DIAGNOSIS — Z88.0: ICD-10-CM

## 2020-04-15 DIAGNOSIS — I27.20: ICD-10-CM

## 2020-04-15 DIAGNOSIS — Z95.0: ICD-10-CM

## 2020-04-15 DIAGNOSIS — Z66: ICD-10-CM

## 2020-04-15 DIAGNOSIS — D69.6: ICD-10-CM

## 2020-04-15 DIAGNOSIS — E66.01: ICD-10-CM

## 2020-04-15 DIAGNOSIS — I13.0: ICD-10-CM

## 2020-04-15 DIAGNOSIS — J44.9: ICD-10-CM

## 2020-04-15 DIAGNOSIS — A41.9: Primary | ICD-10-CM

## 2020-04-15 DIAGNOSIS — I25.10: ICD-10-CM

## 2020-04-15 DIAGNOSIS — Z88.2: ICD-10-CM

## 2020-04-15 DIAGNOSIS — E87.2: ICD-10-CM

## 2020-04-15 DIAGNOSIS — E03.9: ICD-10-CM

## 2020-04-15 DIAGNOSIS — E87.5: ICD-10-CM

## 2020-04-15 DIAGNOSIS — E11.9: ICD-10-CM

## 2020-04-16 VITALS — SYSTOLIC BLOOD PRESSURE: 103 MMHG | DIASTOLIC BLOOD PRESSURE: 47 MMHG

## 2020-04-16 VITALS — DIASTOLIC BLOOD PRESSURE: 43 MMHG | SYSTOLIC BLOOD PRESSURE: 89 MMHG

## 2020-04-16 VITALS — SYSTOLIC BLOOD PRESSURE: 81 MMHG | DIASTOLIC BLOOD PRESSURE: 48 MMHG

## 2020-04-16 VITALS — DIASTOLIC BLOOD PRESSURE: 61 MMHG | SYSTOLIC BLOOD PRESSURE: 111 MMHG

## 2020-04-16 VITALS — DIASTOLIC BLOOD PRESSURE: 44 MMHG | SYSTOLIC BLOOD PRESSURE: 80 MMHG

## 2020-04-16 VITALS — DIASTOLIC BLOOD PRESSURE: 75 MMHG | SYSTOLIC BLOOD PRESSURE: 95 MMHG

## 2020-04-16 VITALS — SYSTOLIC BLOOD PRESSURE: 83 MMHG | DIASTOLIC BLOOD PRESSURE: 38 MMHG

## 2020-04-16 VITALS — DIASTOLIC BLOOD PRESSURE: 37 MMHG | SYSTOLIC BLOOD PRESSURE: 78 MMHG

## 2020-04-16 VITALS — DIASTOLIC BLOOD PRESSURE: 43 MMHG | SYSTOLIC BLOOD PRESSURE: 86 MMHG

## 2020-04-16 VITALS — SYSTOLIC BLOOD PRESSURE: 104 MMHG | DIASTOLIC BLOOD PRESSURE: 51 MMHG

## 2020-04-16 VITALS — SYSTOLIC BLOOD PRESSURE: 87 MMHG | DIASTOLIC BLOOD PRESSURE: 39 MMHG

## 2020-04-16 VITALS — SYSTOLIC BLOOD PRESSURE: 60 MMHG | DIASTOLIC BLOOD PRESSURE: 29 MMHG

## 2020-04-16 VITALS — DIASTOLIC BLOOD PRESSURE: 39 MMHG | SYSTOLIC BLOOD PRESSURE: 84 MMHG

## 2020-04-16 VITALS — DIASTOLIC BLOOD PRESSURE: 40 MMHG | SYSTOLIC BLOOD PRESSURE: 86 MMHG

## 2020-04-16 VITALS — DIASTOLIC BLOOD PRESSURE: 66 MMHG | SYSTOLIC BLOOD PRESSURE: 110 MMHG

## 2020-04-16 VITALS — DIASTOLIC BLOOD PRESSURE: 42 MMHG | SYSTOLIC BLOOD PRESSURE: 86 MMHG

## 2020-04-16 VITALS — DIASTOLIC BLOOD PRESSURE: 44 MMHG | SYSTOLIC BLOOD PRESSURE: 88 MMHG

## 2020-04-16 VITALS — DIASTOLIC BLOOD PRESSURE: 51 MMHG | SYSTOLIC BLOOD PRESSURE: 79 MMHG

## 2020-04-16 VITALS — SYSTOLIC BLOOD PRESSURE: 95 MMHG | DIASTOLIC BLOOD PRESSURE: 37 MMHG

## 2020-04-16 VITALS — DIASTOLIC BLOOD PRESSURE: 46 MMHG | SYSTOLIC BLOOD PRESSURE: 84 MMHG

## 2020-04-16 VITALS — SYSTOLIC BLOOD PRESSURE: 92 MMHG | DIASTOLIC BLOOD PRESSURE: 43 MMHG

## 2020-04-16 VITALS — DIASTOLIC BLOOD PRESSURE: 53 MMHG | SYSTOLIC BLOOD PRESSURE: 107 MMHG

## 2020-04-16 VITALS — DIASTOLIC BLOOD PRESSURE: 53 MMHG | SYSTOLIC BLOOD PRESSURE: 113 MMHG

## 2020-04-16 VITALS — DIASTOLIC BLOOD PRESSURE: 54 MMHG | SYSTOLIC BLOOD PRESSURE: 105 MMHG

## 2020-04-16 VITALS — DIASTOLIC BLOOD PRESSURE: 51 MMHG | SYSTOLIC BLOOD PRESSURE: 82 MMHG

## 2020-04-16 VITALS — SYSTOLIC BLOOD PRESSURE: 107 MMHG | DIASTOLIC BLOOD PRESSURE: 59 MMHG

## 2020-04-16 VITALS — DIASTOLIC BLOOD PRESSURE: 51 MMHG | SYSTOLIC BLOOD PRESSURE: 102 MMHG

## 2020-04-16 VITALS — DIASTOLIC BLOOD PRESSURE: 41 MMHG | SYSTOLIC BLOOD PRESSURE: 84 MMHG

## 2020-04-16 VITALS — SYSTOLIC BLOOD PRESSURE: 68 MMHG | DIASTOLIC BLOOD PRESSURE: 47 MMHG

## 2020-04-16 VITALS — SYSTOLIC BLOOD PRESSURE: 78 MMHG | DIASTOLIC BLOOD PRESSURE: 37 MMHG

## 2020-04-16 VITALS — DIASTOLIC BLOOD PRESSURE: 38 MMHG | SYSTOLIC BLOOD PRESSURE: 80 MMHG

## 2020-04-16 VITALS — SYSTOLIC BLOOD PRESSURE: 103 MMHG | DIASTOLIC BLOOD PRESSURE: 55 MMHG

## 2020-04-16 VITALS — DIASTOLIC BLOOD PRESSURE: 43 MMHG | SYSTOLIC BLOOD PRESSURE: 82 MMHG

## 2020-04-16 VITALS — SYSTOLIC BLOOD PRESSURE: 56 MMHG | DIASTOLIC BLOOD PRESSURE: 25 MMHG

## 2020-04-16 VITALS — SYSTOLIC BLOOD PRESSURE: 62 MMHG | DIASTOLIC BLOOD PRESSURE: 32 MMHG

## 2020-04-16 VITALS — SYSTOLIC BLOOD PRESSURE: 102 MMHG | DIASTOLIC BLOOD PRESSURE: 56 MMHG

## 2020-04-16 VITALS — DIASTOLIC BLOOD PRESSURE: 38 MMHG | SYSTOLIC BLOOD PRESSURE: 67 MMHG

## 2020-04-16 LAB
ALBUMIN SERPL BCG-MCNC: 1.7 GM/DL (ref 3.2–5.2)
ALT SERPL W P-5'-P-CCNC: 19 U/L (ref 12–78)
ANISOCYTOSIS BLD QL SMEAR: (no result)
BASE EXCESS BLDV CALC-SCNC: 4.7 MMOL/L (ref -2–2)
BILIRUB SERPL-MCNC: 0.8 MG/DL (ref 0.2–1)
BUN SERPL-MCNC: 38 MG/DL (ref 7–18)
BUN SERPL-MCNC: 39 MG/DL (ref 7–18)
CALCIUM SERPL-MCNC: 7.8 MG/DL (ref 8.8–10.2)
CALCIUM SERPL-MCNC: 7.8 MG/DL (ref 8.8–10.2)
CHLORIDE SERPL-SCNC: 103 MEQ/L (ref 98–107)
CHLORIDE SERPL-SCNC: 104 MEQ/L (ref 98–107)
CK MB CFR.DF SERPL CALC: 10
CK MB CFR.DF SERPL CALC: 3.78
CK MB SERPL-MCNC: 3 NG/ML (ref ?–3.6)
CK MB SERPL-MCNC: 3.1 NG/ML (ref ?–3.6)
CK SERPL-CCNC: 30 U/L (ref 26–192)
CK SERPL-CCNC: 82 U/L (ref 26–192)
CO2 BLDV CALC-SCNC: 32.7 MEQ/L (ref 24–28)
CO2 SERPL-SCNC: 32 MEQ/L (ref 21–32)
CO2 SERPL-SCNC: 33 MEQ/L (ref 21–32)
CREAT SERPL-MCNC: 1.42 MG/DL (ref 0.55–1.3)
CREAT SERPL-MCNC: 1.48 MG/DL (ref 0.55–1.3)
EOSINOPHIL NFR BLD MANUAL: 2 % (ref 0–3)
EST. AVERAGE GLUCOSE BLD GHB EST-MCNC: 157 MG/DL (ref 60–110)
FERRITIN SERPL-MCNC: 61 NG/ML (ref 8–252)
GFR SERPL CREATININE-BSD FRML MDRD: 35.7 ML/MIN/{1.73_M2} (ref 32–?)
GFR SERPL CREATININE-BSD FRML MDRD: 37.4 ML/MIN/{1.73_M2} (ref 32–?)
GLUCOSE SERPL-MCNC: 109 MG/DL (ref 70–100)
GLUCOSE SERPL-MCNC: 134 MG/DL (ref 70–100)
HCO3 BLDV-SCNC: 31 MEQ/L (ref 23–27)
HCO3 STD BLDV-SCNC: 28.7 MEQ/L
HCT VFR BLD AUTO: 35.8 % (ref 36–47)
HCT VFR BLD AUTO: 36.9 % (ref 36–47)
HGB BLD-MCNC: 10.8 G/DL (ref 12–15.5)
HGB BLD-MCNC: 10.9 G/DL (ref 12–15.5)
HYPOCHROMIA BLD QL SMEAR: (no result)
INR PPP: 4.26
INR PPP: 5.19
IRON SATN MFR SERPL: 13.1 % (ref 13.2–45)
IRON SERPL-MCNC: 30 UG/DL (ref 50–170)
LYMPHOCYTES NFR BLD MANUAL: 6 % (ref 16–44)
MCH RBC QN AUTO: 25.8 PG (ref 27–33)
MCH RBC QN AUTO: 26 PG (ref 27–33)
MCHC RBC AUTO-ENTMCNC: 29.5 G/DL (ref 32–36.5)
MCHC RBC AUTO-ENTMCNC: 30.2 G/DL (ref 32–36.5)
MCV RBC AUTO: 85.6 FL (ref 80–96)
MCV RBC AUTO: 88.1 FL (ref 80–96)
MONOCYTES NFR BLD MANUAL: 1 % (ref 0–5)
NEUTROPHILS NFR BLD MANUAL: 90 % (ref 28–66)
NT-PRO BNP: 4741 PG/ML (ref ?–450)
PCO2 BLDV: 54.4 MMHG (ref 38–50)
PH BLDV: 7.37 UNITS (ref 7.33–7.43)
PLATELET # BLD AUTO: (no result) 10^3/UL (ref 150–450)
PLATELET # BLD AUTO: 84 10^3/UL (ref 150–450)
PLATELET BLD QL SMEAR: (no result)
PLATELET CLUMP BLD QL SMEAR: (no result)
PO2 BLDV: 90.8 MMHG (ref 30–50)
POIKILOCYTOSIS BLD QL SMEAR: (no result)
POLYCHROMASIA BLD QL SMEAR: (no result)
POTASSIUM SERPL-SCNC: 4.3 MEQ/L (ref 3.5–5.1)
POTASSIUM SERPL-SCNC: 5.7 MEQ/L (ref 3.5–5.1)
PROT SERPL-MCNC: 6.1 GM/DL (ref 6.4–8.2)
PROTHROMBIN TIME: 41.1 SECONDS (ref 11.8–14)
PROTHROMBIN TIME: 48.2 SECONDS (ref 11.8–14)
RBC # BLD AUTO: 4.18 10^6/UL (ref 4–5.4)
RBC # BLD AUTO: 4.19 10^6/UL (ref 4–5.4)
SAO2 % BLDV: 96.9 % (ref 60–80)
SODIUM SERPL-SCNC: 138 MEQ/L (ref 136–145)
SODIUM SERPL-SCNC: 138 MEQ/L (ref 136–145)
T4 FREE SERPL-MCNC: 1.36 NG/DL (ref 0.76–1.46)
TARGETS BLD QL SMEAR: (no result)
TIBC SERPL-MCNC: 229 UG/DL (ref 250–450)
TROPONIN I SERPL-MCNC: < 0.02 NG/ML (ref ?–0.1)
TROPONIN I SERPL-MCNC: < 0.02 NG/ML (ref ?–0.1)
TSH SERPL DL<=0.005 MIU/L-ACNC: 5.75 UIU/ML (ref 0.36–3.74)
WBC # BLD AUTO: 14.5 10^3/UL (ref 4–10)
WBC # BLD AUTO: 9.8 10^3/UL (ref 4–10)

## 2020-04-16 RX ADMIN — FLUCONAZOLE SCH MG: 50 TABLET ORAL at 09:20

## 2020-04-16 RX ADMIN — FUROSEMIDE SCH MG: 10 INJECTION, SOLUTION INTRAMUSCULAR; INTRAVENOUS at 03:16

## 2020-04-16 RX ADMIN — BENZONATATE SCH MG: 100 CAPSULE ORAL at 21:44

## 2020-04-16 RX ADMIN — MIDODRINE HYDROCHLORIDE SCH MG: 5 TABLET ORAL at 09:18

## 2020-04-16 RX ADMIN — ACETAMINOPHEN PRN MG: 325 TABLET ORAL at 06:07

## 2020-04-16 RX ADMIN — LEVOTHYROXINE SODIUM SCH MCG: 75 TABLET ORAL at 06:04

## 2020-04-16 RX ADMIN — DEXTROSE MONOHYDRATE SCH MLS/HR: 5 INJECTION INTRAVENOUS at 02:59

## 2020-04-16 RX ADMIN — OMEPRAZOLE SCH MG: 20 CAPSULE, DELAYED RELEASE ORAL at 09:18

## 2020-04-16 RX ADMIN — DEXTROSE MONOHYDRATE SCH MLS/HR: 5 INJECTION INTRAVENOUS at 18:01

## 2020-04-16 RX ADMIN — DEXTROSE MONOHYDRATE SCH MLS/HR: 5 INJECTION INTRAVENOUS at 12:19

## 2020-04-16 RX ADMIN — DEXTROSE MONOHYDRATE SCH MLS/HR: 5 INJECTION INTRAVENOUS at 02:18

## 2020-04-16 RX ADMIN — DEXTROSE MONOHYDRATE SCH MLS/HR: 5 INJECTION INTRAVENOUS at 13:46

## 2020-04-16 RX ADMIN — ACETAMINOPHEN PRN MG: 325 TABLET ORAL at 13:20

## 2020-04-16 RX ADMIN — FUROSEMIDE SCH MG: 10 INJECTION, SOLUTION INTRAMUSCULAR; INTRAVENOUS at 08:29

## 2020-04-16 RX ADMIN — DEXTROSE MONOHYDRATE SCH MLS/HR: 5 INJECTION INTRAVENOUS at 07:39

## 2020-04-16 RX ADMIN — BENZONATATE SCH MG: 100 CAPSULE ORAL at 12:19

## 2020-04-16 RX ADMIN — ACETAMINOPHEN PRN MG: 325 TABLET ORAL at 18:03

## 2020-04-16 RX ADMIN — NYSTATIN SCH DOSE: 100000 POWDER TOPICAL at 18:03

## 2020-04-16 RX ADMIN — MIDODRINE HYDROCHLORIDE SCH MG: 5 TABLET ORAL at 00:12

## 2020-04-16 RX ADMIN — DEXTROSE MONOHYDRATE SCH MLS/HR: 50 INJECTION, SOLUTION INTRAVENOUS at 02:17

## 2020-04-16 RX ADMIN — MIDODRINE HYDROCHLORIDE SCH MG: 5 TABLET ORAL at 15:34

## 2020-04-16 NOTE — REP
HISTORY: Followup.

 

COMPARISON: Multiple, the latest earlier today.

 

The technique utilized in obtaining the radiograph has magnified the cardiac

silhouette and accentuated the interstitial markings.

 

 

 

There is an unchanged opacity in the left lower lobe silhouetting out the left

heart border and diaphragmatic surface of the left lung. The pacemaker device is

unchanged. There is a central venous catheter which is unchanged, the tip

remaining in the superior vena cava. There are no change in the lung fields.

There are no new abnormal opacities. The right pleural angle remains sharp. There

is no change in the osseous structures.

 

IMPRESSION:

 

No significant change compared to the prior exam.

 

 

Electronically Signed by

Subhash Mcclelland DO 04/16/2020 07:22 P

## 2020-04-16 NOTE — HPEPDOC
[facility] Medical History & Physical


Date of Admission


Apr 15, 2020


Date of Service:  Apr 15, 2020


Primary Care Physician:  Stanley Lin MD


Attending Physician:  ZEB BILLY MD





History and Physical


PRIMARY CARE PROVIDER: Stanley Lin MD





ATTENDING: Dr. Zeb Billy





CHIEF COMPLAINT: Chest pain





HISTORY OF PRESENT ILLNESS: History is limited as patient is a poor historian. 

Patient is an 85 year old female presenting as a direct transfer from [facility] with chief complaint of chest pain and SOB. She apparently had 

been recently discharged to a nursing home this past Saturday from an inpatient 

facility for CHF exacerbation and YOEL (Of note, while there she tested negative 

for COVID). Since that discharge patient was complaining of increased fluid 

retention on top of her chronic lymphedema with decreased urine output, and 

apparent bilateral flank pain, but no fevers. On 2020, she presented to 

[facility] ED with similar symptoms but also complaining of dyspnea/chest pain and

was discharged home, but returned on 4/15/2020 complaining of worsening of the 

same symptoms. On arrival to [place] ED she rescinded her prior DNR/DNI code 

status and was found to be hypotensive, with mildly elevated lactic acidosis, 

elevated BNP of about 4000, UTI, a CXR showing left pleural effusion with 

possible infiltrate and was subsequently admitted to the hospital. She was given

zosyn, albuterol nebulizer therapy, and was on 2 Liters nasal cannula while in 

[facility] ED. Patient's dyspneic symptoms were thought to be secondary to CHF 

exacerbation so she was given 40 mg IVP of lasix which dropped her pressure to 

80/43 from SBP in 100s (baseline at nursing home is in 90s).  In light of this 

change in code status and her worsening hypotension, the hospitalist determined 

it would be safer to transfer her to [facility] due to limitations at [facility] 

facilities. 





Presently, she has multiple complaints, but continues to go back to her constant

left sided chest pain/pressure without radiation but denies nausea/vomiting. She

also complains of hurting all over and feeling overall "like crap". She is not 

presently complaining of shortness of breath or suprapubic, flank, or abdominal 

pain. 








PAST MEDICAL HISTORY: 


Hypertension


Hyperlipidemia


Chronic venous stasis with leg wounds


History of DVT/PE


LARRY noncompliant with CPAP


History of complete heart block status post pacemaker


S/p TAVR on warfarin


A. fib


CAD


Congestive heart failure, LVEF 50%, last echo 10/2019. 


GERD


COPD


Hypothyroidism





PAST SURGICAL HISTORY: 


Appendectomy


Cardiac stents


Cholecystectomy


D&C


Call her surgery


Pacemaker 





SOCIAL HISTORY: Never smoker, denies ankle use, denies marijuana, heroin, 

cocaine, PCP use.








FAMILY HISTORY: Non-contributory to chief complaint. Father  secondary to u

nknown metastatic cancer





ALLERGIES: Please see below.





REVIEW OF SYSTEMS:


GENERAL: Denies fevers, chills, recent unexpected weight change, night sweats, 

hemoptysis


HEENT: Denies headache, dizziness, vision changes, hearing loss, sore throat


CARDIOVASCULAR: Denies palpitations, orthopnea. Admits to chest pain. 


RESPIRATORY: Denies wheezing, admits to sob and non-productive cough. 


GASTROINTESTINAL: denies nausea, vomiting, abdominal pain, constipation, 

diarrhea, bloody stool


GENITOURINARY: Denies dysuria,urinary urgency, hematuria.


MUSCULOSKELETAL: Denies muscle/joint pain, weakness, stiffness


NEUROLOGICAL: Denies any numbness/tingling, focal weakness, or syncope








HOME MEDICATIONS: Please see below. 





PHYSICAL EXAMINATION:


Vitals: (see below) 


General: Morbidly obese female who appears stated age in mild-moderate distress,

laying in bed.


HEENT: Normocephalic, atraumatic. EOMI. No scleral icterus. Moist mucous 

membranes. No pharyngeal erythema or uvular deviation. 


Neck: JVD difficult to assess but no appreciable JVD on exam 


Cardiac: distant heart sounds, regular rate, S1 and S2 present, No murmurs 

appreciated on exam. 


Pulm: Diminished breath sounds bilaterally with faint crackles in bilateral lung

bases R>L. No wheezing, rhonchi


Abd: Bowel Sounds present. Obese abdomen soft, non-tender to palpation with 

distraction, non-distended.  No masses or eccymosis. 


Ext: Anasarca throughout all 4 extremities with 2+ pitting edema, worse in 

bilateral lower extremities. Distal fingers are dusky in appearance. 


Neuro: CN 3-12 grossly intact. No focal neurologic deficits. 





LABORATORY DATA: See below.





IMAGIN/15/2020 CXR:


Impression significantly limited examination. Cannot exclude subtle perihilar 

opacities and the possibility of underlying early CHF.





2020 CXR:


Impression:


1. Interval placement of right IJ central line to the distal superior vena cava 

since 4/15/2020. No pneumothorax.


2. Left base infiltrate or atelectasis which is unchanged. 


3. Left pleural effusion which is unchanged. 





MICROBIOLOGY: Please see below. 





ASSESSMENT/PLAN: Patient's history largely obtained from [facility] notes as 

patient will not cooperate with exam or questioning as she is unable to detail a

history of illness of of her symptomatology because she states everything hurts.

However patient is an 85 year old female presenting with worsening dyspnea and 

findings consistent with CAP and CHF exacerbation. 





#. Community acquired pneumonia


-Difficult to discern because of left-pleural effusion whether or not an 

infiltrate is present on exam. Will start patient on Doxycycline and Zosyn as 

patient has had multiple positive pseudomonas blood cultures in the past that 

have been sensitive to these two medications. 





#. Septic Shock


-While patient is not technically SIRS positive, she does exhibit signs of end 

organ dysfunction as she has 2 possible sources of infection, lactic acidosis, 

decreased urine output, has had worsening hypotension since arrival at [facility] and 

has been unresponsive to fluids and necessitating vasopressor support. 


- Patient on antibiotic therapy to cover for typical, atypical, and previously 

positive blood and urine cultures sensitivity findings. 





#. Acute on chronic congestive heart failure


-It is difficult to appreciate on physical exam or by history what her volume 

status is as she seems to be chronically edematous with echo findings detailed 

below that prompt caution in giving IVF in light of her heart failure. Initially

we gave a small 250 cc fluid bolus and patient's home midodrine without any 

effect and eventual worsening blood pressure necessitating central line 

placement. 


- No baseline for patient's BNP, but it is the highest it has ever been at 4741,

as such and now that we are better able to give diuretics we will trial 40 mg IV

lasix and repeat this in 8 hours. 


-Last echo from 10/2019 showing LVEF of 50% w/ "prominently dilated right heart 

chambers and left atrium, right ventricular free wall hypokinesis, and evidence 

of severe pulmonary hypertension. Severe mitral annular calcification with 

moderate obstruction and mild mitral insufficiency. Overall severe pulmonary 

hypertension and non-correctable right heart failure". 





#. UTI


-Patient has chronic indwelling nelson catheter and was complaining of bilateral 

flank pain at [facility] ED. Here she complained of pain all over so I suspect her

positive UA was the result of her indwelling nelson rather than an actual UTI. 

Nevertheless, the doxycycline and zosyn should cover her based on most common 

organisms and prior culture sensitivities. Repeat UTI done here at [facility] positive 

as well, will await sensitivities. 





#. Chest pressure


-EKG's done at [facility] on arrival are of poor quality and repeat attempts have 

yielded similar results, but she seems to have a LBBB that is unchanged from 

EKG's done at [place] or here at [facility] in the past. 


- Troponin at [facility] negative, initial troponin here negative. Repeating 

troponin as patient stating new onset worsening of her chest pressure. 





#. CKD stage III


-Patient's baseline creatinine is 1.15 with mild kidney injury. Will continue to

monitor renal function as we give lasix. Holding nephrotoxic agents. 





#. Hyperkalemia


-Patient's potassium at [facility] ED was 4.5, I suspect this may be a hemolyzed 

specimen especially since she has also received lasix since then. 





#. Atrial fibrillation


-Initial INR supratherapeutic at 4.26, will hold AM dose. May be interaction 

with home fluconazole. 





#. Normocytic anemia


-Close to baseline, but will order iron studies to be certain





#. Questionable hx of Type 2 diabetes


- Patient does not appear to be on any diabetes medication outpatient, last A1C 

in 2018 of 6.5%. reordering A1C. 





#. Hypothyroidism


- Continue home Synthroid


- Will recheck TSh/FT4





#. Atrial Fibrillation


-Continue home warfarin, but hold 2020 dose as patient is supratherapeutic.




- Continue Coreg rate control with hold parameters





#. GERD


-Continue home omeprazole





#. Yeast infection?


-Patient on outpatient fluconazole with stop date of 2020





#. COPD


-Albuterol MDI as needed. 





-DVT prophy: Coumadin





HOSPITALIST ATTENDING PHYSICIAN ADDENDUM:


I have independently interviewed and examined the patient at the bedside, and 

agree with the aforementioned management plans and physical findings as 

documented by my Resident Physician. The patient's questions and concerns have 

been satisfactorily addressed, and the patient was encouraged to contact the 

hospitalist service  for any new issues.





Laboratory Data


Labs 24H


Laboratory Tests 2


4/15/20 23:45: 


Neutrophils (%) (Auto) , Nucleated Red Blood Cells % (auto) 0.0, Prothrombin 

Time 41.1H, Prothromb Time International Ratio 4.26


CBC/BMP


Laboratory Tests


4/15/20 23:45








Microbiology





Microbiology


4/15/20 Blood Culture, Received


          Pending





Home Medications


Scheduled


Benzonatate (Benzonatate) 200 Mg Capsule, 200 MG PO BID


Carvedilol (Carvedilol) 3.125 Mg Tablet, 3.125 MG PO BID


   HOLD FOR BP LESS THAN 100 SYSTOLIC OR LESS THAN 60 BPM 


Fluconazole (Fluconazole) 150 Mg Tablet, 150 MG PO DAILY


   END ON 20 


Furosemide (Lasix) 20 Mg Tablet, 20 MG PO DAILY


Hydrochlorothiazide (Hydrochlorothiazide) 25 Mg Tablet, 25 MG PO DAILY


   DO NOT GIVE IF SBP IS LOWER THAN 100MMHG 


Levothyroxine Sodium (Synthroid) 75 Mcg Tablet, 75 MCG PO DAILY


Midodrine HCl (Midodrine HCl) 5 Mg Tablet, 5 MG PO WM


Omeprazole (Omeprazole) 20 Mg Cap, 20 MG PO DAILY


Potassium Chloride (Potassium Chloride) 20 Meq Tab.er.prt, 20 MEQ PO BID


Spironolactone (Spironolactone) 25 Mg Tablet, 25 MG PO BID


Warfarin Sodium (Warfarin Sodium) 2 Mg Tablet, 2 MG PO QPM





Scheduled PRN


Acetaminophen (Acetaminophen) 325 Mg Tablet, 650 MG PO Q6H PRN for PAIN


Albuterol Sulf (Albuterol Sulfate) 2.5 Mg/3 Ml Nebu, 1 VIAL INH Q4H PRN for 

SHORTNESS OF BREATH


Bisacodyl (Bisacodyl) 10 Mg Supp.rect, 10 MG SD DAILY PRN for CONSTIPATION


Guaifenesin (Guaifenesin) 100 Mg/5 Ml Liquid, 10 ML PO Q6H PRN for COUGH


Ipratropium/Albuterol Sulfate (Iprat-Albut 0.5-3(2.5) mg/3 ml) 3 Ml Ampul.neb, 1

VIAL INH Q4H PRN for SOB/WHEEZING


Magnesium Hydroxide (Milk of Magnesia) 400 Mg/5 Ml Oral.susp, 30 ML PO DAILY PRN

for CONSTIPATION


Saliva Substitute Combo No.9 (Biotene Pbf) 473 Ml Mouthwash, 30 ML SSP QID PRN 

for DRY MOUTH


Sodium Phosphate,Mono-Dibasic (Fleet Enema) 133 Ml Enema, 1 ELMER SD DAILY PRN for

CONSTIPATION





Allergies


Coded Allergies:  


     dirithromycin (Verified  Allergy, Intermediate, hives, 3/27/19)


     Sulfa (Sulfonamide Antibiotics) (Verified  Allergy, Unknown, 3/27/19)


     Penicillins (Verified  Adverse Reaction, Intermediate, tachycardia 40 yrs 

ago, 3/27/19)





A-FIB/CHADSVASC


A-FIB History


Current/History of A-Fib/PAF?:  Yes


Current PO Anticoag Therapy:  Yes





Age/Risk Factor Scoring


CHADSVASC:  








CHADSVASC Response (Comments) Value


 


Age Risk Factor Age >/= 75 years old 2


 


Gender Risk Factor Female 1


 


Hx of CHF Yes 1


 


Hx of HTN No 0


 


Hx of Stroke/TIA/or VTE No 0


 


Hx of Diabetes Yes 1


 


Hx of Vascular Disease Yes 1


 


Total  6











Treatment


Treatment ordered:  Warfarin





GME ATTESTATION


GME ATTESTATION


My faculty preceptor for this patient encounter was physically present during 

the encounter and was fully available. All aspects of the patient interview, 

examination, medical decision making process, and medical care plan development 

were reviewed and approved by the faculty preceptor. The faculty preceptor is 

aware and concurs with the plan as stated in the body of this note and will 

attest to such by his/her cosignature.











LUIS GREEN DO                 2020 01:37


ZEB BILLY MD            2020 22:04

## 2020-04-16 NOTE — REP
Clinical:  Shortness of breath.

 

Comparison:  10/21/2019.

 

Findings:

Examination is limited by portable technique, underpenetration, and poor

inspiratory effort.  The cardiac silhouette appears to be within normal limits.

Pacemaker identified.  Subtle perihilar opacity (left greater than right) cannot

be excluded.  No obvious effusion.  No pneumothorax.  Skeletal structures are

grossly intact.

 

Impression:

Significantly limited examination.

Cannot exclude subtle perihilar opacities and the possibility of underlying early

CHF.

 

 

Electronically Signed by

Geoffrey Pavon MD 04/16/2020 02:33 A

## 2020-04-16 NOTE — REPVR
PROCEDURE INFORMATION: 

Exam: XR Chest, 1 View 

Exam date and time: 4/16/2020 1:55 AM 

Age: 85 years old 

Clinical indication: Device placement; Other: Clp; Additional info: Central 

line placement 



TECHNIQUE: 

Imaging protocol: XR of the chest 

Views: 1 view. 



COMPARISON: 

CR PORTABLE CHEST X-RAY 4/15/2020 11:21 PM 



FINDINGS: 

Tubes, catheters and devices: A pacemaker from the left is unchanged. Interval 

placement of right internal jugular central line to the distal superior vena 

cava. 

Lungs: Left base infiltrate or atelectasis which is unchanged. 

Pleural space: Left pleural effusion which is unchanged. 

Heart/Mediastinum: Status post TAVR. The heart and mediastinum are unchanged. 

Bones/joints: Unremarkable. 

Soft tissues: Right chest wall surgical clips are unchanged. 



IMPRESSION: 

1. Interval placement of right internal jugular central line to the distal 

superior vena cava since 04/15/2020. No pneumothorax. 

2. Otherwise stable chest. 



Electronically signed by: Alex Millard On 04/16/2020  02:03:35 AM

## 2020-04-16 NOTE — ECGEPIP
[facility]

                                       

                                       Test Date:    2020

Pat Name:     ABBEY OSMAN            Department:   

Patient ID:   [id number]                 Room:         [place] [address]

Gender:       Female                   Technician:   

:          [id number]               Requested By: LUIS GREEN 

Order Number: [id number]     Reading MD:   Oleg Ashley

                                 Measurements

Intervals                              Axis          

Rate:         69                       P:            

WI:           0                        QRS:          19

QRSD:         163                      T:            130

QT:           442                                    

QTc:          476                                    

                           Interpretive Statements

Underlying atrial fibrillation, ventricular paced rhythm at 69 bpm.

  No significant change compared with 4/15/2020 at 2341 hrs.

FYI:ECG interpretation from ECG 4/15/2020 at 2341 was incorrect; that ECG 

interpretation should be atrial fibrillation, ventricular paced rhythm at 69

bpm.

Electronically Signed on 2020 13:41:04 EDT by Oleg Ashley

## 2020-04-17 VITALS — SYSTOLIC BLOOD PRESSURE: 88 MMHG | DIASTOLIC BLOOD PRESSURE: 54 MMHG

## 2020-04-17 VITALS — DIASTOLIC BLOOD PRESSURE: 53 MMHG | SYSTOLIC BLOOD PRESSURE: 80 MMHG

## 2020-04-17 VITALS — SYSTOLIC BLOOD PRESSURE: 87 MMHG | DIASTOLIC BLOOD PRESSURE: 54 MMHG

## 2020-04-17 VITALS — DIASTOLIC BLOOD PRESSURE: 53 MMHG | SYSTOLIC BLOOD PRESSURE: 93 MMHG

## 2020-04-17 VITALS — SYSTOLIC BLOOD PRESSURE: 78 MMHG | DIASTOLIC BLOOD PRESSURE: 47 MMHG

## 2020-04-17 VITALS — SYSTOLIC BLOOD PRESSURE: 84 MMHG | DIASTOLIC BLOOD PRESSURE: 49 MMHG

## 2020-04-17 VITALS — DIASTOLIC BLOOD PRESSURE: 59 MMHG | SYSTOLIC BLOOD PRESSURE: 82 MMHG

## 2020-04-17 VITALS — DIASTOLIC BLOOD PRESSURE: 45 MMHG | SYSTOLIC BLOOD PRESSURE: 90 MMHG

## 2020-04-17 VITALS — DIASTOLIC BLOOD PRESSURE: 53 MMHG | SYSTOLIC BLOOD PRESSURE: 89 MMHG

## 2020-04-17 VITALS — SYSTOLIC BLOOD PRESSURE: 79 MMHG | DIASTOLIC BLOOD PRESSURE: 43 MMHG

## 2020-04-17 VITALS — SYSTOLIC BLOOD PRESSURE: 87 MMHG | DIASTOLIC BLOOD PRESSURE: 57 MMHG

## 2020-04-17 VITALS — SYSTOLIC BLOOD PRESSURE: 133 MMHG | DIASTOLIC BLOOD PRESSURE: 98 MMHG

## 2020-04-17 VITALS — SYSTOLIC BLOOD PRESSURE: 78 MMHG | DIASTOLIC BLOOD PRESSURE: 41 MMHG

## 2020-04-17 VITALS — DIASTOLIC BLOOD PRESSURE: 46 MMHG | SYSTOLIC BLOOD PRESSURE: 98 MMHG

## 2020-04-17 VITALS — DIASTOLIC BLOOD PRESSURE: 47 MMHG | SYSTOLIC BLOOD PRESSURE: 78 MMHG

## 2020-04-17 VITALS — SYSTOLIC BLOOD PRESSURE: 95 MMHG | DIASTOLIC BLOOD PRESSURE: 42 MMHG

## 2020-04-17 VITALS — DIASTOLIC BLOOD PRESSURE: 50 MMHG | SYSTOLIC BLOOD PRESSURE: 91 MMHG

## 2020-04-17 VITALS — SYSTOLIC BLOOD PRESSURE: 92 MMHG | DIASTOLIC BLOOD PRESSURE: 46 MMHG

## 2020-04-17 VITALS — SYSTOLIC BLOOD PRESSURE: 92 MMHG | DIASTOLIC BLOOD PRESSURE: 45 MMHG

## 2020-04-17 VITALS — SYSTOLIC BLOOD PRESSURE: 82 MMHG | DIASTOLIC BLOOD PRESSURE: 44 MMHG

## 2020-04-17 VITALS — SYSTOLIC BLOOD PRESSURE: 90 MMHG | DIASTOLIC BLOOD PRESSURE: 44 MMHG

## 2020-04-17 VITALS — SYSTOLIC BLOOD PRESSURE: 86 MMHG | DIASTOLIC BLOOD PRESSURE: 52 MMHG

## 2020-04-17 VITALS — SYSTOLIC BLOOD PRESSURE: 93 MMHG | DIASTOLIC BLOOD PRESSURE: 66 MMHG

## 2020-04-17 VITALS — SYSTOLIC BLOOD PRESSURE: 79 MMHG | DIASTOLIC BLOOD PRESSURE: 53 MMHG

## 2020-04-17 VITALS — DIASTOLIC BLOOD PRESSURE: 49 MMHG | SYSTOLIC BLOOD PRESSURE: 98 MMHG

## 2020-04-17 VITALS — DIASTOLIC BLOOD PRESSURE: 39 MMHG | SYSTOLIC BLOOD PRESSURE: 87 MMHG

## 2020-04-17 VITALS — SYSTOLIC BLOOD PRESSURE: 65 MMHG | DIASTOLIC BLOOD PRESSURE: 36 MMHG

## 2020-04-17 VITALS — SYSTOLIC BLOOD PRESSURE: 89 MMHG | DIASTOLIC BLOOD PRESSURE: 42 MMHG

## 2020-04-17 VITALS — SYSTOLIC BLOOD PRESSURE: 87 MMHG | DIASTOLIC BLOOD PRESSURE: 53 MMHG

## 2020-04-17 VITALS — DIASTOLIC BLOOD PRESSURE: 42 MMHG | SYSTOLIC BLOOD PRESSURE: 89 MMHG

## 2020-04-17 VITALS — DIASTOLIC BLOOD PRESSURE: 55 MMHG | SYSTOLIC BLOOD PRESSURE: 148 MMHG

## 2020-04-17 VITALS — DIASTOLIC BLOOD PRESSURE: 53 MMHG | SYSTOLIC BLOOD PRESSURE: 88 MMHG

## 2020-04-17 VITALS — DIASTOLIC BLOOD PRESSURE: 53 MMHG | SYSTOLIC BLOOD PRESSURE: 96 MMHG

## 2020-04-17 VITALS — DIASTOLIC BLOOD PRESSURE: 54 MMHG | SYSTOLIC BLOOD PRESSURE: 91 MMHG

## 2020-04-17 VITALS — SYSTOLIC BLOOD PRESSURE: 104 MMHG | DIASTOLIC BLOOD PRESSURE: 52 MMHG

## 2020-04-17 VITALS — SYSTOLIC BLOOD PRESSURE: 92 MMHG | DIASTOLIC BLOOD PRESSURE: 54 MMHG

## 2020-04-17 VITALS — SYSTOLIC BLOOD PRESSURE: 83 MMHG | DIASTOLIC BLOOD PRESSURE: 49 MMHG

## 2020-04-17 VITALS — DIASTOLIC BLOOD PRESSURE: 54 MMHG | SYSTOLIC BLOOD PRESSURE: 99 MMHG

## 2020-04-17 VITALS — SYSTOLIC BLOOD PRESSURE: 79 MMHG | DIASTOLIC BLOOD PRESSURE: 40 MMHG

## 2020-04-17 VITALS — SYSTOLIC BLOOD PRESSURE: 87 MMHG | DIASTOLIC BLOOD PRESSURE: 42 MMHG

## 2020-04-17 VITALS — SYSTOLIC BLOOD PRESSURE: 95 MMHG | DIASTOLIC BLOOD PRESSURE: 50 MMHG

## 2020-04-17 VITALS — SYSTOLIC BLOOD PRESSURE: 102 MMHG | DIASTOLIC BLOOD PRESSURE: 31 MMHG

## 2020-04-17 VITALS — DIASTOLIC BLOOD PRESSURE: 41 MMHG | SYSTOLIC BLOOD PRESSURE: 58 MMHG

## 2020-04-17 VITALS — SYSTOLIC BLOOD PRESSURE: 85 MMHG | DIASTOLIC BLOOD PRESSURE: 41 MMHG

## 2020-04-17 VITALS — DIASTOLIC BLOOD PRESSURE: 47 MMHG | SYSTOLIC BLOOD PRESSURE: 103 MMHG

## 2020-04-17 VITALS — SYSTOLIC BLOOD PRESSURE: 94 MMHG | DIASTOLIC BLOOD PRESSURE: 55 MMHG

## 2020-04-17 VITALS — DIASTOLIC BLOOD PRESSURE: 40 MMHG | SYSTOLIC BLOOD PRESSURE: 74 MMHG

## 2020-04-17 VITALS — DIASTOLIC BLOOD PRESSURE: 50 MMHG | SYSTOLIC BLOOD PRESSURE: 85 MMHG

## 2020-04-17 VITALS — SYSTOLIC BLOOD PRESSURE: 57 MMHG | DIASTOLIC BLOOD PRESSURE: 40 MMHG

## 2020-04-17 VITALS — DIASTOLIC BLOOD PRESSURE: 43 MMHG | SYSTOLIC BLOOD PRESSURE: 84 MMHG

## 2020-04-17 VITALS — SYSTOLIC BLOOD PRESSURE: 90 MMHG | DIASTOLIC BLOOD PRESSURE: 52 MMHG

## 2020-04-17 VITALS — SYSTOLIC BLOOD PRESSURE: 95 MMHG | DIASTOLIC BLOOD PRESSURE: 52 MMHG

## 2020-04-17 VITALS — DIASTOLIC BLOOD PRESSURE: 42 MMHG | SYSTOLIC BLOOD PRESSURE: 94 MMHG

## 2020-04-17 VITALS — SYSTOLIC BLOOD PRESSURE: 90 MMHG | DIASTOLIC BLOOD PRESSURE: 49 MMHG

## 2020-04-17 VITALS — DIASTOLIC BLOOD PRESSURE: 46 MMHG | SYSTOLIC BLOOD PRESSURE: 96 MMHG

## 2020-04-17 VITALS — DIASTOLIC BLOOD PRESSURE: 46 MMHG | SYSTOLIC BLOOD PRESSURE: 78 MMHG

## 2020-04-17 VITALS — SYSTOLIC BLOOD PRESSURE: 97 MMHG | DIASTOLIC BLOOD PRESSURE: 52 MMHG

## 2020-04-17 VITALS — SYSTOLIC BLOOD PRESSURE: 89 MMHG | DIASTOLIC BLOOD PRESSURE: 37 MMHG

## 2020-04-17 VITALS — DIASTOLIC BLOOD PRESSURE: 34 MMHG | SYSTOLIC BLOOD PRESSURE: 63 MMHG

## 2020-04-17 VITALS — DIASTOLIC BLOOD PRESSURE: 51 MMHG | SYSTOLIC BLOOD PRESSURE: 94 MMHG

## 2020-04-17 VITALS — SYSTOLIC BLOOD PRESSURE: 72 MMHG | DIASTOLIC BLOOD PRESSURE: 38 MMHG

## 2020-04-17 VITALS — SYSTOLIC BLOOD PRESSURE: 92 MMHG | DIASTOLIC BLOOD PRESSURE: 44 MMHG

## 2020-04-17 VITALS — DIASTOLIC BLOOD PRESSURE: 46 MMHG | SYSTOLIC BLOOD PRESSURE: 94 MMHG

## 2020-04-17 VITALS — DIASTOLIC BLOOD PRESSURE: 59 MMHG | SYSTOLIC BLOOD PRESSURE: 125 MMHG

## 2020-04-17 VITALS — DIASTOLIC BLOOD PRESSURE: 54 MMHG | SYSTOLIC BLOOD PRESSURE: 98 MMHG

## 2020-04-17 VITALS — DIASTOLIC BLOOD PRESSURE: 32 MMHG | SYSTOLIC BLOOD PRESSURE: 73 MMHG

## 2020-04-17 VITALS — SYSTOLIC BLOOD PRESSURE: 86 MMHG | DIASTOLIC BLOOD PRESSURE: 48 MMHG

## 2020-04-17 VITALS — SYSTOLIC BLOOD PRESSURE: 78 MMHG | DIASTOLIC BLOOD PRESSURE: 43 MMHG

## 2020-04-17 VITALS — DIASTOLIC BLOOD PRESSURE: 38 MMHG | SYSTOLIC BLOOD PRESSURE: 75 MMHG

## 2020-04-17 VITALS — DIASTOLIC BLOOD PRESSURE: 40 MMHG | SYSTOLIC BLOOD PRESSURE: 92 MMHG

## 2020-04-17 VITALS — SYSTOLIC BLOOD PRESSURE: 83 MMHG | DIASTOLIC BLOOD PRESSURE: 50 MMHG

## 2020-04-17 VITALS — SYSTOLIC BLOOD PRESSURE: 71 MMHG | DIASTOLIC BLOOD PRESSURE: 51 MMHG

## 2020-04-17 VITALS — SYSTOLIC BLOOD PRESSURE: 97 MMHG | DIASTOLIC BLOOD PRESSURE: 57 MMHG

## 2020-04-17 VITALS — DIASTOLIC BLOOD PRESSURE: 52 MMHG | SYSTOLIC BLOOD PRESSURE: 98 MMHG

## 2020-04-17 VITALS — DIASTOLIC BLOOD PRESSURE: 39 MMHG | SYSTOLIC BLOOD PRESSURE: 91 MMHG

## 2020-04-17 VITALS — SYSTOLIC BLOOD PRESSURE: 104 MMHG | DIASTOLIC BLOOD PRESSURE: 51 MMHG

## 2020-04-17 VITALS — SYSTOLIC BLOOD PRESSURE: 79 MMHG | DIASTOLIC BLOOD PRESSURE: 39 MMHG

## 2020-04-17 VITALS — DIASTOLIC BLOOD PRESSURE: 46 MMHG | SYSTOLIC BLOOD PRESSURE: 73 MMHG

## 2020-04-17 LAB
ALBUMIN SERPL BCG-MCNC: 1.6 GM/DL (ref 3.2–5.2)
ALT SERPL W P-5'-P-CCNC: 15 U/L (ref 12–78)
BILIRUB SERPL-MCNC: 0.8 MG/DL (ref 0.2–1)
BUN SERPL-MCNC: 37 MG/DL (ref 7–18)
CALCIUM SERPL-MCNC: 7.7 MG/DL (ref 8.8–10.2)
CHLORIDE SERPL-SCNC: 103 MEQ/L (ref 98–107)
CO2 SERPL-SCNC: 36 MEQ/L (ref 21–32)
CREAT SERPL-MCNC: 1.49 MG/DL (ref 0.55–1.3)
GFR SERPL CREATININE-BSD FRML MDRD: 35.4 ML/MIN/{1.73_M2} (ref 32–?)
GLUCOSE SERPL-MCNC: 157 MG/DL (ref 70–100)
HCT VFR BLD AUTO: 35.4 % (ref 36–47)
HGB BLD-MCNC: 11 G/DL (ref 12–15.5)
INR PPP: 5.07
MCH RBC QN AUTO: 26.1 PG (ref 27–33)
MCHC RBC AUTO-ENTMCNC: 31.1 G/DL (ref 32–36.5)
MCV RBC AUTO: 83.9 FL (ref 80–96)
PLATELET # BLD AUTO: 70 10^3/UL (ref 150–450)
POTASSIUM SERPL-SCNC: 4.3 MEQ/L (ref 3.5–5.1)
PROT SERPL-MCNC: 5.8 GM/DL (ref 6.4–8.2)
PROTHROMBIN TIME: 47.3 SECONDS (ref 11.8–14)
RBC # BLD AUTO: 4.22 10^6/UL (ref 4–5.4)
SODIUM SERPL-SCNC: 136 MEQ/L (ref 136–145)
WBC # BLD AUTO: 16.2 10^3/UL (ref 4–10)

## 2020-04-17 RX ADMIN — DEXTROSE MONOHYDRATE SCH MLS/HR: 50 INJECTION, SOLUTION INTRAVENOUS at 20:30

## 2020-04-17 RX ADMIN — LEVOTHYROXINE SODIUM SCH MCG: 75 TABLET ORAL at 06:09

## 2020-04-17 RX ADMIN — DEXTROSE MONOHYDRATE SCH MLS/HR: 5 INJECTION INTRAVENOUS at 01:16

## 2020-04-17 RX ADMIN — DOBUTAMINE HYDROCHLORIDE SCH MLS/HR: 200 INJECTION INTRAVENOUS at 12:59

## 2020-04-17 RX ADMIN — DEXTROSE MONOHYDRATE SCH MLS/HR: 50 INJECTION, SOLUTION INTRAVENOUS at 17:05

## 2020-04-17 RX ADMIN — DEXTROSE MONOHYDRATE SCH MLS/HR: 5 INJECTION INTRAVENOUS at 12:21

## 2020-04-17 RX ADMIN — DEXTROSE MONOHYDRATE SCH MLS/HR: 50 INJECTION, SOLUTION INTRAVENOUS at 00:23

## 2020-04-17 RX ADMIN — NYSTATIN SCH DOSE: 100000 POWDER TOPICAL at 20:05

## 2020-04-17 RX ADMIN — DEXTROSE MONOHYDRATE SCH MLS/HR: 5 INJECTION INTRAVENOUS at 18:20

## 2020-04-17 RX ADMIN — BENZONATATE SCH MG: 100 CAPSULE ORAL at 20:03

## 2020-04-17 RX ADMIN — OMEPRAZOLE SCH MG: 20 CAPSULE, DELAYED RELEASE ORAL at 08:59

## 2020-04-17 RX ADMIN — BENZONATATE SCH MG: 100 CAPSULE ORAL at 09:00

## 2020-04-17 RX ADMIN — DEXTROSE MONOHYDRATE SCH MLS/HR: 5 INJECTION INTRAVENOUS at 13:46

## 2020-04-17 RX ADMIN — DEXTROSE MONOHYDRATE SCH MLS/HR: 50 INJECTION, SOLUTION INTRAVENOUS at 18:02

## 2020-04-17 RX ADMIN — DEXTROSE MONOHYDRATE SCH MLS/HR: 5 INJECTION INTRAVENOUS at 00:13

## 2020-04-17 RX ADMIN — FLUCONAZOLE SCH MG: 50 TABLET ORAL at 08:59

## 2020-04-17 RX ADMIN — DEXTROSE MONOHYDRATE SCH MLS/HR: 5 INJECTION INTRAVENOUS at 07:42

## 2020-04-17 RX ADMIN — ACETAMINOPHEN PRN MG: 325 TABLET ORAL at 09:11

## 2020-04-17 RX ADMIN — MIDODRINE HYDROCHLORIDE SCH MG: 5 TABLET ORAL at 08:59

## 2020-04-17 RX ADMIN — NYSTATIN SCH DOSE: 100000 POWDER TOPICAL at 09:03

## 2020-04-17 RX ADMIN — MIDODRINE HYDROCHLORIDE SCH MG: 5 TABLET ORAL at 17:05

## 2020-04-17 RX ADMIN — DEXTROSE MONOHYDRATE SCH MLS/HR: 50 INJECTION, SOLUTION INTRAVENOUS at 02:09

## 2020-04-17 NOTE — PHACANCOPD
PHARMACY VANCOMYCIN DOSING


Pt Demographics


Demographics


Patient Age:85 , Weight:129.500  , Gender: female


Adjusted Body Weight


Date: 4/17/20, Adjusted Body Weight:  Kg





Events Past 24 Hours


Events Past 24 Hours:  YES: Elevation in WBC





Vancomycin


Vancomycin Target Ranges:  15-20 mcg/ml


Vancomycin Load Y/N:  Yes


Load Dose Date Time


Vancomycin Load Dose: 2 GM        Date:   4/17/20      Time: 1400


Vancomycin Dose


Date: 4/17/20. Current Vancomycin Dose: [1500 MG IV Q24H]


Intermittent Dosing?:  No





Labs


Micro





Microbiology


4/16/20 Urine Culture - Final, Complete


          Yeast Like Organism


4/16/20 Respiratory Virus Panel (PCR) ([facility]) - Final, Complete


          


4/15/20 Blood Culture - Preliminary, Resulted


          No growth after 24 hours . All specim...


Creatinine Clearance


Date:4/17/20. Creatinine Clearance: .





Assessment and Plan


Maintaining Current Dose?:  Yes


Reason for dose change:  No Dose Change





Pharmacist Note


Pharmacist Note


Date: 4/17/20. Pharmacist note: Pharmacy consulted for dosing of Vancomycin for 

treatment of Community Acquired Pneumonia with a goal trough of 15-20 mcg/ml. 

Patient does not have a history of MRSA here at [facility]. MRSA PCR has been ordered. 

We'll load her with 2 grams and follow with 1500 mg q24h. Pharmacy will continue

to monitor and make adjustments as needed.











[facility]         Apr 17, 2020 14:16

## 2020-04-17 NOTE — IPNPDOC
Subjective


Date Seen


The patient was seen on 4/17/20.





Subjective


Chief Complaint/HPI


Patient is still complaining of pain all over the body and this still is short 

of breath


Constitutional:  Denies: Chills, Fever, Malaise, Night Sweats, Weakness, 

Fatigue, Weight Loss, Lethargy, Other


Eyes:  Denies: Pain, Vision change, Conjunctivae inflammation, Eyelid 

inflammation, Redness, Other


Pulmonary:  Reports: Dyspnea


Cardiovascular:  Denies: Chest Pain, Palpitations, Orthopnea, Paroxysmal Noc. 

Dyspnea, Edema, Lt Headedness, Other Symptoms


Gastrointestinal:  Denies: Nausea, Vomiting, Abdominal Pain, Diarrhea, 

Constipation, Melena, Hematochezia, Other Symptoms


Genitourinary:  Denies: Dysuria, Frequency, Incontinence, Hematuria, Retention, 

Other Symptoms


Musculoskeletal:  Reports: Other Symptoms (body pain)


Neurological:  Denies: Weakness, Numbness, Incoordination, Change in speech, 

Confusion, Seizures, Other Symptoms





Objective


Physical Examination


General Exam:  Positive: Alert, Cooperative


Eye Exam:  Positive: PERRLA, Conjunctiva & lids normal


ENT Exam:  Positive: Atraumatic


Neck Exam:  Positive: Supple


Chest Exam:  Positive: Other (, crackles bilaterally with decreased breath 

sounds bilaterally)


Heart Exam:  Positive: Rate Normal, Normal S1, Normal S2


Abdomen Exam:  Positive: Normal bowel sounds, Soft


Skin Exam:  Positive: Nl turgor and temperature


Neuro Exam:  Positive: Strength at 5/5 X4 ext, Sensation Intact, Cranial Nerves 

3-12 NL


Psych Exam:  Positive: Anxiety, Oriented x 3





Assessment /Plan


Problems





(1) Sepsis


Status:  Acute


Problem Text:  Sepsis with shock/hypotension , most likely multifactorial. 

Patient does have also diagnosis of pneumonia and UTI and possible fungal 

infection of urine


Patient was admitted to ICU for close management


Patient was unresponsive to fluids. Hence vasopressor was started. Patient is 

currently currently on Levophed 15   micrograms, but is still hypotensive


Patient was started on Zosyn and doxycycline, I'll DC doxycycline and is 

started. Vancomycin 500 mg IV every 12 hours for broad-spectrum coverage


Also will add Diflucan 100 mg IV every 24 hours for indications of fungal i

nfection and UA and urine cultures


Continue critical care support, critical care consult with Dr. Etienne has been 

requested


Further recommendation as per critical care attending





(2) Community acquired pneumonia


Status:  Acute


Problem Text:  On chest x-ray. Patient doesn't have an opacity at the left lower

lung, most likely pneumonia


WBC count still high 16.2, even though patient is afebrile


Will DC doxycycline added vancomycin along with Zosyn and Diflucan


Follow CBC, CMP in a.m.


Supportive care





(3) UTI (urinary tract infection)


Status:  Acute


Problem Text:  UA shows fungal-like organisms and urine cultures also consistent

with fungal-like organisms


Diflucan has been started


Will follow clinically





(4) Acute on chronic diastolic (congestive) heart failure


Status:  Acute


Problem Text:  Patient had a stat echo done last night which showed EF of 75% 

with very increased right ventricular pressure of 93, consistent with severe 

pulmonary hypertension


Discussed with Dr. Ashley   over the phone, he recommends to continue diuresis 

with Lasix. Patient's CVP pressure was 15, but will continue Lasix secondary to 

persistent shortness of breath, severe bipedal edema and crackles in lung and 

will clinically and adjust the dosage are stopped diuresis depending on patient 

response to clinical therapy


As per Dr. Ashley patient has poor prognosis and nothing else further can be 

offered at this point except aggressive diuresis





(5) HTN (hypertension)


Status:  Chronic


Problem Text:  All antihypertensive meds on hold secondary to septic shock





(6) HLD (hyperlipidemia)


Status:  Chronic


Problem Text:  Continue home meds





(7) Moderate to severe pulmonary hypertension


Status:  Chronic


Problem Text:  As above





(8) Atrial fibrillation


Status:  Chronic


Problem Text:  Coumadin on hold because of supratherapeutic INR


Continue home meds. His vent rate is under control





(9) Diabetes


Status:  Chronic


Problem Text:  Fingerstick blood sugar every 6 hours and at bedtime with co

verage





(10) Morbid obesity


Status:  Chronic


Problem Text:  History of morbid obesity with BMI of 49


On consistent carbohydrate diet








Plan/VTE


VTE Prophylaxis Ordered?:  Yes





VS, I&O, 24H, Fishbone


Vital Signs/I&O





Vital Signs








  Date Time  Temp Pulse Resp B/P (MAP) Pulse Ox O2 Delivery O2 Flow Rate FiO2


 


4/17/20 09:15    90/49 (63)    


 


4/17/20 09:00  70 26  99 Nasal Cannula 2.0 


 


4/17/20 08:00 97.7       














I&O- Last 24 Hours up to 6 AM 


 


 4/17/20





 06:00


 


Intake Total 986.95 ml


 


Output Total 1850 ml


 


Balance -863.05 ml











Laboratory Data


24H LABS


Laboratory Tests 2


4/16/20 18:05: Bedside Glucose (Misc Panel) 135H


4/17/20 05:39: 


Nucleated Red Blood Cells % (auto) 0.5H, Immature Platelet Fraction 7.5, 

Prothrombin Time 47.3H, Prothromb Time International Ratio 5.07*H, Anion Gap , 

Glomerular Filtration Rate 35.4, Calcium Level 7.7L, Total Bilirubin 0.8, 

Aspartate Amino Transf (AST/SGOT) 23, Alanine Aminotransferase (ALT/SGPT) 15, 

Alkaline Phosphatase 147H, Total Protein 5.8L, Albumin 1.6L, Albumin/Globulin 

Ratio 0.38L


CBC/BMP


Laboratory Tests


4/17/20 05:39








Microbiology





Microbiology


4/16/20 Urine Culture - Final, Complete


          Yeast Like Organism


4/16/20 Respiratory Virus Panel (PCR) ([facility]) - Final, Complete


          


4/15/20 Blood Culture - Preliminary, Resulted


          No growth after 24 hours . All specim...











JEFFERY SORENSON MD              Apr 17, 2020 12:45

## 2020-04-17 NOTE — CCN
DATE OF SERVICE: 04/17/2020

 

I was called to evaluate this 85-year-old  female in the intensive care

unit because of persistent hypotension despite pressor therapy. On review of the

patient's records, she has an extensive past medical history, including

obstructive sleep apnea syndrome, obesity, hypertension, nonrheumatic heart

disease with aortic valve sclerosis, and stenosis status post transcatheter

aortic valve replacement (TAVR) procedure February 2014 complicated by heart

block requiring a dual-chamber pacemaker, chronic atrial fibrillation.

 

The patient was transferred here last evening from [facility], where

she presented for evaluation.  She was given Lasix there, and her blood pressure

fell. Since arrival, she has had a central line placed.  Elevated central venous

pressures were measured and additional diuretics administered.  She has been

started on pressor therapy, and dose of pressors required to maintain a mean

arterial pressure of 65 has been progressively increasing.

 

At bedside, her temperature is 97.7, pulse rate 70, respirations 26, blood

pressure was 90/49.  HEENT:  Oral and nasal mucosa are pink.  No stridor over the

trachea.  Jugular veins are unable to be appreciated.  Heart sounds are distant

and paced.  Breath sounds absent in the left base, coarse in the right.  No other

focal sounds.  Abdomen is obese. The patient is anasarcic with edema and dressed

wounds on her extremities:  Pulses are difficult to appreciate.

 

DIAGNOSTIC STUDIES:

Chest x-ray shows a large left pleural effusion and a very large heart shadow, a

pacemaker in situ.

 

Her white cell count is 16.2, hemoglobin 11, hemoglobin 35.5, platelet count

70,000.

 

Sodium 136, potassium 4.3, chloride 103, CO2 of 36, BUN 37, creatinine 1.49,

glucose 152.  INR is 5.07.

 

Venous gas was done with pH of 7.37, pCO2 of 54, pO2 of 96.  EKG shows atrial

fibrillation, ventricularly paced at 69.

 

Echocardiogram shows moderate mitral regurgitation, tricuspid regurgitation, and

a right ventricular systolic pressure of 93.

 

The primary problem requiring critical attention is cardiogenic shock with acute

on chronic pulmonary hypertension.  We will add dobutamine in an effort to

improve cardiac output and increase diuresis.  I agree with intravenous (IV)

Lasix and targeting diuresis as blood pressure allows.

 

The patient is anasarcic and in massive fluid overload.

 

There is a left pleural effusion.  This may need to be drained at some point.

The patient is not sufficiently clinically stable at this point to allow for it.

 

Thrombocytopenia is of unclear etiology.  Will continue following her platelet

count.  INR is elevated, no doubt be because of hepatic congestion from her heart

failure.

 

The case was discussed with cardiology, who indicate that her prognosis is very

poor.

 

There was 1 hour and 27 minutes spent in the provision of bedside critical care

coordination, exclusive of any procedure time.

## 2020-04-17 NOTE — ECHO
DATE OF PROCEDURE:  04/16/2020

 

REFERRING PHYSICIAN:  Dr. Benito King

INDICATION: Congestive heart failure, unspecified.

 

HEIGHT:  5 feet 4 inches.

WEIGHT:  286 pounds, 10 ounces.

 

2D MEASUREMENTS:

Aortic root:  2.6 cm

Left atrium:  4.6 cm

Ventricular septum:  1.2 cm

Posterior wall:  1.3 cm

Left ventricle diastole:  3.6 cm

LVOT:  2.1 cm

Inferior vena cava:  2.2 cm with marked reduction of respiratory variation.

 

DOPPLER MEASUREMENTS:

Aortic valve velocity:  248 cm/s

LVOT velocity:  132 cm/s

LVOT VTI:  26.8 cm

No aortic stenosis.

No aortic regurgitation.

Moderate mitral regurgitation.

Mild mitral stenosis.

Mitral E velocity (CW):  2.0 cm/s

Mean mitral valve gradient:  6 mmHg

Mitral valve area (pressure half time method):  1.20 cm squared

Moderate tricuspid regurgitation.

Estimated right ventricle systolic pressure:  At least 93 mmHg assuming a

pressure of at least 20 mmHg.

Very mild pulmonic regurgitation.

 

MITRAL ANNULAR TISSUE DOPPLER:

E prime lateral: 8.8 cm/s

E prime septal: 6.0 cm/s

 

DESCRIPTION: Rhythm was ventricular paced at 70 beats per minute Underlying

atrial fibrillation. Image quality was fair.

 

CONCLUSIONS:

1. Very severe elevation of estimated right ventricle systolic pressure (at least

93 mmHg). At least mild right ventricle dilatation. Preserved overall right

ventricle systolic function by visual impression. Partial flattening of the

ventricular septum in keeping with both pressure and volume overload of the right

ventricle. At least moderate right atrial dilatation. Structurally normal

appearing tricuspid leaflets. Moderate tricuspid regurgitation.

 

2. Inferior vena cava plethora with marked reduction of respiratory variation,

suggestive of elevated central venous pressure of at least 20 mmHg.

 

3. Large left pleural effusion.

 

4. No pericardial effusion.

 

5. Mild concentric left ventricle hypertrophy. Mild paradoxical septal motion.

Hyperdynamic LV systolic function. Left ventricular ejection fraction (LVEF) 75%

by visual estimate.

 

6. Well seated and structurally and functionally normal TAVR aortic valve

bioprosthesis. No aortic stenosis or regurgitation.

 

7. Severe mitral annular calcification. Mild mitral stenosis. Moderate mitral

regurgitation.

 

8. At least mild left atrial dilatation.

 

9. Cardiac rhythm management leads identified in the right side of the heart.

 

Results of this echocardiogram Doppler were communicated verbally by cell phone

to Dr. Benito King, approximately 10 or 15 minutes prior to this report being

dictated into the system.

## 2020-04-18 VITALS — SYSTOLIC BLOOD PRESSURE: 84 MMHG | DIASTOLIC BLOOD PRESSURE: 49 MMHG

## 2020-04-18 VITALS — SYSTOLIC BLOOD PRESSURE: 102 MMHG | DIASTOLIC BLOOD PRESSURE: 50 MMHG

## 2020-04-18 VITALS — SYSTOLIC BLOOD PRESSURE: 100 MMHG | DIASTOLIC BLOOD PRESSURE: 48 MMHG

## 2020-04-18 VITALS — SYSTOLIC BLOOD PRESSURE: 92 MMHG | DIASTOLIC BLOOD PRESSURE: 53 MMHG

## 2020-04-18 VITALS — SYSTOLIC BLOOD PRESSURE: 105 MMHG | DIASTOLIC BLOOD PRESSURE: 61 MMHG

## 2020-04-18 VITALS — DIASTOLIC BLOOD PRESSURE: 51 MMHG | SYSTOLIC BLOOD PRESSURE: 102 MMHG

## 2020-04-18 VITALS — SYSTOLIC BLOOD PRESSURE: 92 MMHG | DIASTOLIC BLOOD PRESSURE: 50 MMHG

## 2020-04-18 VITALS — DIASTOLIC BLOOD PRESSURE: 49 MMHG | SYSTOLIC BLOOD PRESSURE: 92 MMHG

## 2020-04-18 VITALS — DIASTOLIC BLOOD PRESSURE: 53 MMHG | SYSTOLIC BLOOD PRESSURE: 109 MMHG

## 2020-04-18 VITALS — DIASTOLIC BLOOD PRESSURE: 51 MMHG | SYSTOLIC BLOOD PRESSURE: 91 MMHG

## 2020-04-18 VITALS — SYSTOLIC BLOOD PRESSURE: 92 MMHG | DIASTOLIC BLOOD PRESSURE: 55 MMHG

## 2020-04-18 VITALS — DIASTOLIC BLOOD PRESSURE: 52 MMHG | SYSTOLIC BLOOD PRESSURE: 90 MMHG

## 2020-04-18 VITALS — SYSTOLIC BLOOD PRESSURE: 88 MMHG | DIASTOLIC BLOOD PRESSURE: 52 MMHG

## 2020-04-18 VITALS — SYSTOLIC BLOOD PRESSURE: 91 MMHG | DIASTOLIC BLOOD PRESSURE: 53 MMHG

## 2020-04-18 VITALS — DIASTOLIC BLOOD PRESSURE: 51 MMHG | SYSTOLIC BLOOD PRESSURE: 98 MMHG

## 2020-04-18 VITALS — SYSTOLIC BLOOD PRESSURE: 96 MMHG | DIASTOLIC BLOOD PRESSURE: 54 MMHG

## 2020-04-18 VITALS — SYSTOLIC BLOOD PRESSURE: 96 MMHG | DIASTOLIC BLOOD PRESSURE: 49 MMHG

## 2020-04-18 VITALS — DIASTOLIC BLOOD PRESSURE: 54 MMHG | SYSTOLIC BLOOD PRESSURE: 104 MMHG

## 2020-04-18 LAB
ALBUMIN SERPL BCG-MCNC: 1.5 GM/DL (ref 3.2–5.2)
ALT SERPL W P-5'-P-CCNC: 13 U/L (ref 12–78)
BASOPHILS # BLD AUTO: 0.1 10^3/UL (ref 0–0.2)
BASOPHILS NFR BLD AUTO: 0.4 % (ref 0–1)
BILIRUB SERPL-MCNC: 0.7 MG/DL (ref 0.2–1)
BUN SERPL-MCNC: 36 MG/DL (ref 7–18)
CALCIUM SERPL-MCNC: 7.5 MG/DL (ref 8.8–10.2)
CHLORIDE SERPL-SCNC: 101 MEQ/L (ref 98–107)
CHOLEST SERPL-MCNC: 115 MG/DL (ref ?–200)
CK SERPL-CCNC: 25 U/L (ref 26–192)
CO2 SERPL-SCNC: 36 MEQ/L (ref 21–32)
CREAT SERPL-MCNC: 1.47 MG/DL (ref 0.55–1.3)
CRP SERPL-MCNC: 11 MG/DL (ref 0–0.3)
EOSINOPHIL # BLD AUTO: 0.6 10^3/UL (ref 0–0.5)
EOSINOPHIL NFR BLD AUTO: 5.1 % (ref 0–3)
GFR SERPL CREATININE-BSD FRML MDRD: 36 ML/MIN/{1.73_M2} (ref 32–?)
GLUCOSE SERPL-MCNC: 214 MG/DL (ref 70–100)
HCT VFR BLD AUTO: 32.9 % (ref 36–47)
HGB BLD-MCNC: 10.4 G/DL (ref 12–15.5)
INR PPP: 6.46
LDH SERPL L TO P-CCNC: 275 U/L (ref 84–246)
LYMPHOCYTES # BLD AUTO: 1 10^3/UL (ref 1.5–5)
LYMPHOCYTES NFR BLD AUTO: 7.9 % (ref 24–44)
MAGNESIUM SERPL-MCNC: 1.5 MG/DL (ref 1.8–2.4)
MCH RBC QN AUTO: 26.5 PG (ref 27–33)
MCHC RBC AUTO-ENTMCNC: 31.6 G/DL (ref 32–36.5)
MCV RBC AUTO: 83.7 FL (ref 80–96)
MONOCYTES # BLD AUTO: 0.7 10^3/UL (ref 0–0.8)
MONOCYTES NFR BLD AUTO: 5.5 % (ref 0–5)
NEUTROPHILS # BLD AUTO: 9.9 10^3/UL (ref 1.5–8.5)
NEUTROPHILS NFR BLD AUTO: 80.5 % (ref 36–66)
PHOSPHATE SERPL-MCNC: 2.9 MG/DL (ref 2.5–4.9)
PLATELET # BLD AUTO: 39 10^3/UL (ref 172–450)
PLATELET # BLD AUTO: 43 K/MM3 (ref 172–450)
PLATELET # BLD AUTO: 55 10^3/UL (ref 150–450)
POTASSIUM SERPL-SCNC: 4.1 MEQ/L (ref 3.5–5.1)
PROT SERPL-MCNC: 5.6 GM/DL (ref 6.4–8.2)
PROTHROMBIN TIME: 57.4 SECONDS (ref 11.8–14)
RBC # BLD AUTO: 3.93 10^6/UL (ref 4–5.4)
SODIUM SERPL-SCNC: 136 MEQ/L (ref 136–145)
TRIGL SERPL-MCNC: 139 MG/DL (ref ?–150)
WBC # BLD AUTO: 12.3 10^3/UL (ref 4–10)

## 2020-04-18 RX ADMIN — DEXTROSE MONOHYDRATE SCH MLS/HR: 5 INJECTION INTRAVENOUS at 06:07

## 2020-04-18 RX ADMIN — NYSTATIN SCH DOSE: 100000 POWDER TOPICAL at 19:59

## 2020-04-18 RX ADMIN — DOBUTAMINE HYDROCHLORIDE SCH MLS/HR: 200 INJECTION INTRAVENOUS at 03:03

## 2020-04-18 RX ADMIN — NYSTATIN SCH DOSE: 100000 POWDER TOPICAL at 09:00

## 2020-04-18 RX ADMIN — DEXTROSE MONOHYDRATE SCH MLS/HR: 5 INJECTION INTRAVENOUS at 01:01

## 2020-04-18 RX ADMIN — DEXTROSE MONOHYDRATE SCH MLS/HR: 50 INJECTION, SOLUTION INTRAVENOUS at 07:45

## 2020-04-18 RX ADMIN — DEXTROSE MONOHYDRATE SCH MLS/HR: 50 INJECTION, SOLUTION INTRAVENOUS at 03:29

## 2020-04-18 RX ADMIN — LEVOTHYROXINE SODIUM SCH MCG: 75 TABLET ORAL at 06:07

## 2020-04-18 NOTE — IPNPDOC
Subjective


Date Seen


The patient was seen on 4/18/20.





Subjective


Chief Complaint/HPI


Patient feeling slightly better in no cardiopulmonary distress. I had a 

extensive discussion with the patient at bedside, all her medical conditions and

the treatment and plan was explained to her and also she will inform about poor 

prognosis secondary to severe pulmonary hypertension and multiple chronic 

medical problems. Patient decided DNR/DNI and comfort measures at the present 

time she does not want any heroic measures to be taken to several life. I also 

spoke with patient's healthcare proxy who is her good friend and informed her 

about patient's wishes and she agrees with the patient's wishes as well.


General:  Denies: ROS Unobtainable, Chills, Night Sweats, Fatigue, Malaise, 

Normal Appetite, Other Symptoms


Constitutional:  Denies: Chills, Fever, Malaise, Night Sweats, Weakness, Fatigue

, Weight Loss, Lethargy, Other


Pulmonary:  Denies: Dyspnea, Cough, Pleuritic Chest Pain, Other Symptoms


Cardiovascular:  Denies: Chest Pain, Palpitations, Orthopnea, Paroxysmal Noc. 

Dyspnea, Edema, Lt Headedness, Other Symptoms


Gastrointestinal:  Denies: Nausea, Vomiting, Abdominal Pain, Diarrhea, 

Constipation, Melena, Hematochezia, Other Symptoms


Musculoskeletal:  Denies: Neck Pain, Back Pain, Shoulder Pain, Arm Pain, Hand 

Pain, Leg Pain, Foot Pain, Joint Pain, Muscle Pain, Spasms, Other Symptoms


Neurological:  Denies: Weakness, Numbness, Incoordination, Change in speech, 

Confusion, Seizures, Other Symptoms


Psych:  Denies: Mood Normal, Anxiety, Depression, Memory Issues, Thoughts of 

Self Harm, Anger, Thoughts of Harming Other, Other Psych





Objective


Physical Examination


General Exam:  Positive: Alert, Cooperative


Eye Exam:  Positive: PERRLA, Conjunctiva & lids normal


ENT Exam:  Positive: Atraumatic


Neck Exam:  Positive: Supple


Chest Exam:  Positive: Other (, crackles bilaterally with decreased breath 

sounds bilaterally)


Heart Exam:  Positive: Rate Normal, Normal S1, Normal S2


Abdomen Exam:  Positive: Normal bowel sounds, Soft


Skin Exam:  Positive: Nl turgor and temperature


Neuro Exam:  Positive: Strength at 5/5 X4 ext, Sensation Intact, Cranial Nerves 

3-12 NL


Psych Exam:  Positive: Anxiety, Oriented x 3





Assessment /Plan


Problems





(1) Sepsis


Status:  Acute


Problem Text:   Patient had decided DNR/DNI and comfort measures only


With DC vasopressors, IV fluids and IV antibiotics


Patient will be transferred to [facility] floor with comfort measures only


Discussed with Dr. Etienne and he agrees with the current change in management a

s per patient's wishes





(2) Community acquired pneumonia


Status:  Acute


Problem Text:  DC IV antibiotics


Supportive measures


Patient support and nebulizer as needed for comfort measures





(3) UTI (urinary tract infection)


Status:  Acute


Problem Text:  DC IV antibiotics


Supportive measures





(4) Acute on chronic diastolic (congestive) heart failure


Status:  Acute


Problem Text:  DC IV Lasix


Will start by mouth Lasix if patient developed shortness of breath as comfort 

measures





(5) HTN (hypertension)


Status:  Chronic


Problem Text:   





(6) HLD (hyperlipidemia)


Status:  Chronic


Problem Text:   





(7) Moderate to severe pulmonary hypertension


Status:  Chronic


Problem Text:  Comfort measures only


Poor prognosis





(8) Atrial fibrillation


Status:  Chronic


Problem Text:   DC anticoagulation


Comfort measures only





(9) Diabetes


Status:  Chronic


Problem Text:  Fingerstick blood sugar every 6 hours and at bedtime with 

coverage





(10) Morbid obesity


Status:  Chronic


Problem Text:   








Plan/VTE


VTE Prophylaxis Ordered?:  Yes





VS, I&O, 24H, Fishbone


Vital Signs/I&O





Vital Signs








  Date Time  Temp Pulse Resp B/P (MAP) Pulse Ox O2 Delivery O2 Flow Rate FiO2


 


4/18/20 08:00  69  104/54 (71) 90 Nasal Cannula 2.0 


 


4/18/20 07:45 97.1  20     














I&O- Last 24 Hours up to 6 AM 


 


 4/18/20





 06:00


 


Intake Total 3023.6 ml


 


Output Total 4135 ml


 


Balance -1111.4 ml











Laboratory Data


24H LABS


Laboratory Tests 2


4/17/20 14:58: 


Cortisol Baseline 14.5, Methicillin-Resist S.aureus DNA PCR DETECTEDA


4/17/20 20:01: Bedside Glucose (Misc Panel) 201H


4/18/20 05:41: 


Platelet Count, EDTA Free 43L, Prothrombin Time 57.4H, Prothromb Time 

International Ratio 6.46*H, Anion Gap , Glomerular Filtration Rate 36.0, Calcium

Level 7.5L, Phosphorus Level 2.9, Magnesium Level 1.5L, Total Bilirubin 0.7, 

Aspartate Amino Transf (AST/SGOT) 17, Alanine Aminotransferase (ALT/SGPT) 13, 

Alkaline Phosphatase 135H, Lactate Dehydrogenase 275H, Total Creatine Kinase 

25L, C-Reactive Protein, Quantitative 11.00H, Total Protein 5.6L, Albumin 1.5L, 

Albumin/Globulin Ratio 0.37L, Triglycerides Level 139, Cholesterol Level 115


4/18/20 05:42: 


Immature Granulocyte % (Auto) 0.6, Neutrophils (%) (Auto) 80.5H, Lymphocytes (%)

(Auto) 7.9L, Monocytes (%) (Auto) 5.5H, Eosinophils (%) (Auto) 5.1H, Basophils 

(%) (Auto) 0.4, Neutrophils # (Auto) 9.9H, Lymphocytes # (Auto) 1.0L, Monocytes 

# (Auto) 0.7, Eosinophils # (Auto) 0.6H, Basophils # (Auto) 0.1, Nucleated Red 

Blood Cells % (auto) 0.3H, Immature Platelet Fraction 7.7


CBC/BMP


Laboratory Tests


4/18/20 05:41








4/18/20 05:42








Microbiology





Microbiology


4/16/20 Urine Culture - Final, Complete


          Yeast Like Organism


4/16/20 Respiratory Virus Panel (PCR) ([facility]) - Final, Complete


          


4/15/20 Blood Culture - Preliminary, Resulted


          No Growth after 48 hours. All Specime...











JEFFERY SORENSON MD              Apr 18, 2020 10:05

## 2020-04-19 RX ADMIN — NYSTATIN SCH DOSE: 100000 POWDER TOPICAL at 09:42

## 2020-04-19 RX ADMIN — NYSTATIN SCH DOSE: 100000 POWDER TOPICAL at 21:14

## 2020-04-19 NOTE — IPNPDOC
Subjective


Date Seen


The patient was seen on 4/19/20.





Subjective


Chief Complaint/HPI


Patient is sleeping comfortably in no apparent distress. Discussed with RN, No 

overnight issues reported


General:  Reports: ROS Unobtainable





Objective


Physical Examination


Chest Exam:  Positive: Clear to auscultation, Other (minimum crackles at bases)


Heart Exam:  Positive: Rate Normal, Normal S1, Normal S2


Abdomen Exam:  Positive: Normal bowel sounds, Soft





Assessment /Plan


Problems





(1) Sepsis


Status:  Acute


Problem Text:  Patient had decided DNR/DNI and comfort measures only


With DC vasopressors, IV fluids and IV antibiotics


Patient will be transferred to [facility] floor with comfort measures only


Discussed with Dr. Etienne and he agrees with the current change in management 

as per patient's wishes


Pain management has been started


Hospice consult for a.m. as patient can be discharged home on hospice





(2) Community acquired pneumonia


Status:  Acute


Problem Text:  DC IV antibiotics


Supportive measures


Patient support and nebulizer as needed for comfort measures





(3) UTI (urinary tract infection)


Status:  Acute


Problem Text:  DC IV antibiotics


Supportive measures





(4) Acute on chronic diastolic (congestive) heart failure


Status:  Acute


Problem Text:  DC IV Lasix


Will start by mouth Lasix if patient developed shortness of breath as comfort 

measures





(5) HTN (hypertension)


Status:  Chronic


Problem Text:   





(6) HLD (hyperlipidemia)


Status:  Chronic


Problem Text:   





(7) Moderate to severe pulmonary hypertension


Status:  Chronic


Problem Text:  Comfort measures only


Poor prognosis





(8) Atrial fibrillation


Status:  Chronic


Problem Text:   DC anticoagulation


Comfort measures only





(9) Diabetes


Status:  Chronic


Problem Text:  Will DC fingersticks


Will continue patient's home diabetic meds





(10) Morbid obesity


Status:  Chronic


Problem Text:   








Plan/VTE


VTE Prophylaxis Ordered?:  Yes





VS, I&O, 24H, Fishbone


Vital Signs/I&O





Vital Signs








  Date Time  Temp Pulse Resp B/P (MAP) Pulse Ox O2 Delivery O2 Flow Rate FiO2


 


4/18/20 21:00       2.0 


 


4/18/20 09:46  69  84/49 (61)    


 


4/18/20 08:30     91 Nasal Cannula  


 


4/18/20 07:45 97.1  20     














I&O- Last 24 Hours up to 6 AM 


 


 4/19/20





 06:00


 


Intake Total 1176.4 ml


 


Output Total 1620 ml


 


Balance -443.6 ml











Laboratory Data


Microbiology





Microbiology


4/16/20 Urine Culture - Final, Complete


          Yeast Like Organism


4/16/20 Respiratory Virus Panel (PCR) ([facility]) - Final, Complete


          


4/15/20 Blood Culture - Preliminary, Resulted


          No Growth after 72 hours. All specime...











JEFFERY SORENSON MD              Apr 19, 2020 09:33

## 2020-04-20 RX ADMIN — NYSTATIN SCH DOSE: 100000 POWDER TOPICAL at 13:26

## 2020-04-20 NOTE — DS.PDOC
Discharge Summary


General


Date of Admission


Apr 15, 2020 at 22:37


Date of Discharge


4/20/20





Discharge Summary


PROCEDURES PERFORMED DURING STAY: None.





ADMITTING DIAGNOSES: 


1. Community required pneumonia.





DISCHARGE DIAGNOSES:


1. Community acquired pneumonia.





COMPLICATIONS/CHIEF COMPLAINT: Community Acquired Pneumonia.





HISTORY OF PRESENT ILLNESS:  History is limited as patient is a poor historian. 

Patient is an 85 year old female presenting as a direct transfer from [facility] with chief complaint of chest pain and SOB. She apparently had 

been recently discharged to a nursing home this past Saturday from an inpatient 

facility for CHF exacerbation and YO[facility] (Of note, while there she tested negative 

for COVID). Since that discharge patient was complaining of increased fluid 

retention on top of her chronic lymphedema with decreased urine output, and 

apparent bilateral flank pain, but no fevers. On 4/14/2020, she presented to 

[place] [facility] with similar symptoms but also complaining of dyspnea/chest pain and

was discharged home, but returned on 4/15/2020 complaining of worsening of the 

same symptoms. On arrival to [place] ED she rescinded her prior DNR/DNI code 

status and was found to be hypotensive, with mildly elevated lactic acidosis, 

elevated BNP of about 4000, UTI, a CXR showing left pleural effusion with 

possible infiltrate and was subsequently admitted to the hospital. She was given

zosyn, albuterol nebulizer therapy, and was on 2 Liters nasal cannula while in 

[place] ED. Patient's dyspneic symptoms were thought to be secondary to CHF 

exacerbation so she was given 40 mg IVP of lasix which dropped her pressure to 

80/43 from SBP in 100s (baseline at nursing home is in 90s).  In light of this 

change in code status and her worsening hypotension, the hospitalist determined 

it would be safer to transfer her to [facility] due to limitations at [facility] 

facilities. .





HOSPITAL COURSE:  


Patient was initially admitted with sepsis secondary to pneumonia and was 

started on IV antibiotics and secondary to hypertension, admitted to ICU.


Patient was started on multiple vasopressors without any significant impact.


Cardiologist was called and diuresis was recommended as patient has a poor 

prognosis secondary to severe pulmonary hypertension, that's all could be 

recommended by Lamonte Ashley.


Later on patient decided to sign DNR, DNI and comfort measures only 


All vasopressors, IV fluids and IV antibiotics were DC'd


Patient was transferred to MedSurg floor for comfort measures only


Discussed with Dr. Etienne and he agrees with the current change in management 

as per patient's wishes


Pain management has been started


Pt has been accepted to [facility] with comfort measures only


Pt will be discharged to [facility] today on all current medications 

and advised to continue patient comfortable for improving her quality of life in

a terminal condition. . 





DISCHARGE MEDICATIONS: Please see below.


 


ALLERGIES: Please see below.





PHYSICAL EXAMINATION ON DISCHARGE:


VITAL SIGNS: Please see below.


GENERAL: Within normal limits


HEENT: PERRLA. Extraocular muscles intact


NECK: 


Supple


CARDIOVASCULAR EXAMINATION: S1, S2, regular


RESPIRATORY EXAMINATION: Clear to A&P


ABDOMINAL EXAMINATION: Benign


EXTREMITIES: No clubbing, cyanosis, edema


SKIN: Normal


NEUROLOGICAL EXAMINATION: . No focal motor sensory deficit 


PSYCHIATRIC EXAMINATION: Normal





LABORATORY DATA: Please see below.





IMAGING: Chest x-ray:IMPRESSION:


 


No significant change compared to the prior exam.





PROGNOSIS: Poor





ACTIVITY: As tolerated.





DIET: As tolerated





DISCHARGE PLAN: Discharge back to [facility] with comfort measures 

only





DISPOSITION: Back to [facility].





DISCHARGE INSTRUCTIONS:


1. Comfort measures only.





ITEMS TO FOLLOWUP ON ON OUTPATIENT:


1. Follow with PCP in one week.





DISCHARGE CONDITION: Stable.





TIME SPENT ON DISCHARGE: 32 minutes.





Vital Signs/I&Os





Vital Signs








  Date Time  Temp Pulse Resp B/P (MAP) Pulse Ox O2 Delivery O2 Flow Rate FiO2


 


4/19/20 21:00       2.0 


 


4/18/20 09:46  69  84/49 (61)    


 


4/18/20 08:30     91 Nasal Cannula  


 


4/18/20 07:45 97.1  20     














I&O- Last 24 Hours up to 6 AM 


 


 4/20/20





 06:00


 


Intake Total 100 ml


 


Output Total 150 ml


 


Balance -50 ml











Microbiology





Microbiology


4/16/20 Urine Culture - Final, Complete


          Yeast Like Organism


4/16/20 Respiratory Virus Panel (PCR) ([facility]) - Final, Complete


          


4/15/20 Blood Culture - Preliminary, Resulted


          No Growth after 72 hours. All specime...





Discharge Medications


Scheduled


Nystatin (Nystop) 60 Gm Powder, 0 DOSE TOP BID





Scheduled PRN


Acetaminophen (Acetaminophen) 325 Mg Tablet, 650 MG PO Q6H PRN for PAIN, 

(Reported)


Acetaminophen (Acetaminophen) 325 Mg Tablet, 650 MG PO Q4H PRN for PAIN OR FEVER


Albuterol Sulfate (Albuterol Sulfate) 2.5 Mg/0.5 Ml Vial.neb, 2.5 MG NEB Q4HP 

PRN for SOB/WHEEZING


Alprazolam (Alprazolam) 0.25 Mg Tablet, 0.25 MG PO Q6HP PRN for ANXIETY


Loperamide HCl (Anti-Diarrheal) 2 Mg Tablet, 2 MG PO ASDIRECTED PRN for DIARRHEA


Ondansetron (Ondansetron Odt) 4 Mg Tab.rapdis, 4 MG SL Q4HP PRN for NAUSEA OR 

VOMITING


Oxycodone/Acetaminophen (Oxycodone-Acetaminophen 5-325) 1 Each Tablet, 1 TAB PO 

Q6HP PRN for MILD/MODERATE PAIN (PS 1-7)


Oxycodone/Acetaminophen (Oxycodone-Acetaminophen 5-325) 1 Each Tablet, 2 TAB PO 

Q6HP PRN for SEVERE PAIN (PS 8-10)


Saliva Substitute Combo No.9 (Biotene Pbf) 473 Ml Mouthwash, 30 ML SSP QID PRN 

for DRY MOUTH, (Reported)


Scopolamine (Transderm-Scop) 1 Each Patch.td.3, 1 MG TOP Q3DP PRN for EXCESSIVE 

SECRETIONS





Allergies


Coded Allergies:  


     dirithromycin (Verified  Allergy, Intermediate, hives, 3/27/19)


     Sulfa (Sulfonamide Antibiotics) (Verified  Allergy, Unknown, 3/27/19)


     Penicillins (Verified  Adverse Reaction, Intermediate, tachycardia 40 yrs 

ago, 3/27/19)











JEFFERY SORENSON MD              Apr 20, 2020 12:38

## 2020-04-20 NOTE — IPNPDOC
Subjective


Date Seen


The patient was seen on 4/20/20.





Subjective


Chief Complaint/HPI


Patient actually feels a lot better once she is off medications. Awaiting 

hospice evaluation


General:  Denies: ROS Unobtainable, Chills, Night Sweats, Fatigue, Malaise, 

Normal Appetite, Other Symptoms


Constitutional:  Denies: Chills, Fever, Malaise, Night Sweats, Weakness, 

Fatigue, Weight Loss, Lethargy, Other


Pulmonary:  Denies: Dyspnea, Cough, Pleuritic Chest Pain, Other Symptoms


Cardiovascular:  Denies: Chest Pain, Palpitations, Orthopnea, Paroxysmal Noc. 

Dyspnea, Edema, Lt Headedness, Other Symptoms


Gastrointestinal:  Denies: Nausea, Vomiting, Abdominal Pain, Diarrhea, 

Constipation, Melena, Hematochezia, Other Symptoms


Musculoskeletal:  Denies: Neck Pain, Back Pain, Shoulder Pain, Arm Pain, Hand 

Pain, Leg Pain, Foot Pain, Joint Pain, Muscle Pain, Spasms, Other Symptoms


Neurological:  Denies: Weakness, Numbness, Incoordination, Change in speech, 

Confusion, Seizures, Other Symptoms





Objective


Physical Examination


General Exam:  Positive: Alert, Cooperative


Chest Exam:  Positive: Clear to auscultation, Other (minimum crackles at bases)


Heart Exam:  Positive: Rate Normal, Normal S1, Normal S2


Abdomen Exam:  Positive: Normal bowel sounds, Soft





Assessment /Plan


Problems





(1) Sepsis


Status:  Acute


Problem Text:  Patient had decided DNR/DNI and comfort measures only


With DC vasopressors, IV fluids and IV antibiotics


Patient has been transferred to [facility] floor for comfort measures only


Discussed with Dr. Etienne and he agrees with the current change in management 

as per patient's wishes


Pain management has been started


Hospice consult was placed and yesterday


Discussed with the social work and case management regarding possible transfer 

to her facility with a hospice or home with hospice


Continue present care





(2) Community acquired pneumonia


Status:  Acute


Problem Text:  DC IV antibiotics


Supportive measures


Patient support and nebulizer as needed for comfort measures





(3) UTI (urinary tract infection)


Status:  Acute


Problem Text:  DC IV antibiotics


Supportive measures





(4) Acute on chronic diastolic (congestive) heart failure


Status:  Acute


Problem Text:  DC IV Lasix


Will start by mouth Lasix if patient developed shortness of breath as comfort 

measures





(5) HTN (hypertension)


Status:  Chronic


Problem Text:   





(6) HLD (hyperlipidemia)


Status:  Chronic


Problem Text:   





(7) Moderate to severe pulmonary hypertension


Status:  Chronic


Problem Text:  Comfort measures only


Poor prognosis





(8) Atrial fibrillation


Status:  Chronic


Problem Text:   DC anticoagulation


Comfort measures only





(9) Diabetes


Status:  Chronic


Problem Text:  Will DC fingersticks


Will continue patient's home diabetic meds





(10) Morbid obesity


Status:  Chronic


Problem Text:   








Plan/VTE


VTE Prophylaxis Ordered?:  Yes





VS, I&O, 24H, Fishbone


Vital Signs/I&O





Vital Signs








  Date Time  Temp Pulse Resp B/P (MAP) Pulse Ox O2 Delivery O2 Flow Rate FiO2


 


4/19/20 21:00       2.0 


 


4/18/20 09:46  69  84/49 (61)    


 


4/18/20 08:30     91 Nasal Cannula  


 


4/18/20 07:45 97.1  20     














I&O- Last 24 Hours up to 6 AM 


 


 4/20/20





 06:00


 


Intake Total 100 ml


 


Output Total 150 ml


 


Balance -50 ml











Laboratory Data


Microbiology





Microbiology


4/16/20 Urine Culture - Final, Complete


          Yeast Like Organism


4/16/20 Respiratory Virus Panel (PCR) ([facility]) - Final, Complete


          


4/15/20 Blood Culture - Preliminary, Resulted


          No Growth after 72 hours. All specime...











JEFFERY SORENSON MD              Apr 20, 2020 10:50

## 2021-10-18 NOTE — ECGEPIP
[facility]

                                       

                                       Test Date:    2020-04-15

Pat Name:     ABBEY OSMAN            Department:   

Patient ID:   [id number]                 Room:         [place] [address]

Gender:       Female                   Technician:   

:          1935               Requested By: ZEB HOPE

Order Number: [id number]     Reading MD:   Oleg Ashley

                                 Measurements

Intervals                              Axis          

Rate:         69                       P:            

ID:           0                        QRS:          -33

QRSD:         172                      T:            150

QT:           470                                    

QTc:          506                                    

                           Interpretive Statements

AV sequential paced rhythm at 69 bpm.

No significant change compared with 10/21/2019.

Electronically Signed on 2020 13:37:44 EDT by Oleg Ashley s/p code blue and intubated
